# Patient Record
Sex: FEMALE | Race: WHITE | NOT HISPANIC OR LATINO | Employment: FULL TIME | URBAN - NONMETROPOLITAN AREA
[De-identification: names, ages, dates, MRNs, and addresses within clinical notes are randomized per-mention and may not be internally consistent; named-entity substitution may affect disease eponyms.]

---

## 2017-07-17 ENCOUNTER — HOSPITAL ENCOUNTER (EMERGENCY)
Facility: HOSPITAL | Age: 27
Discharge: HOME | End: 2017-07-17
Attending: NURSE PRACTITIONER | Admitting: NURSE PRACTITIONER
Payer: COMMERCIAL

## 2017-07-17 VITALS
RESPIRATION RATE: 17 BRPM | DIASTOLIC BLOOD PRESSURE: 72 MMHG | OXYGEN SATURATION: 98 % | TEMPERATURE: 98.6 F | HEART RATE: 71 BPM | SYSTOLIC BLOOD PRESSURE: 106 MMHG

## 2017-07-17 DIAGNOSIS — J02.9 VIRAL PHARYNGITIS: ICD-10-CM

## 2017-07-17 LAB
DEPRECATED S PYO AG THROAT QL EIA: NORMAL
MICRO REPORT STATUS: NORMAL
SPECIMEN SOURCE: NORMAL

## 2017-07-17 PROCEDURE — 87081 CULTURE SCREEN ONLY: CPT | Performed by: FAMILY MEDICINE

## 2017-07-17 PROCEDURE — 99213 OFFICE O/P EST LOW 20 MIN: CPT

## 2017-07-17 PROCEDURE — 99213 OFFICE O/P EST LOW 20 MIN: CPT | Performed by: NURSE PRACTITIONER

## 2017-07-17 PROCEDURE — 87880 STREP A ASSAY W/OPTIC: CPT | Performed by: FAMILY MEDICINE

## 2017-07-17 NOTE — ED AVS SNAPSHOT
[facility]    [address]    [place] [address]    Phone:  [phone]                                       Tiffanie Alejo   MRN: [id number]    Department:  [facility]   Date of Visit:  7/17/2017           Patient Information     Date Of Birth          1990        Your diagnoses for this visit were:     Viral pharyngitis        You were seen by Audelia Almanzar NP.      Follow-up Information     Follow up with Pinky Medel MD.    Specialty:  Family Practice    Why:  As needed, If symptoms worsen    Contact information:    [facility] [place]  [address]  [place] [address] [phone]          Follow up with [facility].    Specialty:  EMERGENCY MEDICINE    Why:  As needed, If symptoms worsen    Contact information:    [address]  [place] [address] [phone]    Additional information:    From [place]: Take [address]. Turn left at [address]. Turn left at the first stoplight on [address]. At the first stop sign, take a right onto [address]. Take a left into the parking lot and continue through until you reach the North enterance of the building.       From [place]: Take [address]. Take the [address] ramp towards [place]. Turn left onto [address]. Take a slight right onto [address]. Turn left at the first stoplight on [address]. At the first stop sign, take a right onto [address]. Take a left into the parking lot and continue through until you reach the North enterance of the building.       From [place]: Take [address]. Take a right at [address]. At the first stop sign, take a right onto [address]. Take a left into the parking lot and continue through until you reach the North enterance of the building.         Discharge Instructions          * PHARYNGITIS (Sore Throat),REPORT PENDING    Pharyngitis (sore throat) is  often due to a virus, but can also be caused by the  strep  bacteria. This is called  strep throat . Both viral and strep infection can cause throat pain that is worse when swallowing, aching all over with headache and fever. Both types of infections are contagious. They may be spread by coughing, kissing or touching others after touching your mouth or nose, so wash your hands often.  A test has been done to determine whether or not you have strep throat. If it is positive for strep infection you will usually need to take antibiotics. If the test is negative, you probably have a viral pharyngitis, and antibiotic treatment will not help you recover.  HOME CARE:    If your symptoms are severe, rest at home for the first 2-3 days. If you are told that your test is positive for strep, you should be off work and school for the first two days of antibiotic treatment. After that, you will no longer be as contagious.    Children: Use acetaminophen (Tylenol) for fever, fussiness or discomfort. In infants over six months of age, you may use ibuprofen (Children's Motrin) instead of Tylenol. [NOTE: If your child has chronic liver or kidney disease or ever had a stomach ulcer or GI bleeding, talk with your doctor before using these medicines.]   (Aspirin should never be used in anyone under 18 years of age who is ill with a fever. It may cause severe liver damage.)  Adults: You may use acetaminophen (Tylenol) 650-1000 mg every 6 hours or ibuprofen (Motrin, Advil) 600 mg every 6-8 hours with food to control pain, if you are able to take these medicines. [NOTE: If you have chronic liver or kidney disease or ever had a stomach ulcer or GI bleeding, talk with your doctor before using these medicines.]    Throat lozenges or sprays (Chloraseptic and others), or gargling with warm salt water will reduce throat pain. Dissolve 1/2 teaspoon of salt in 1 glass of warm water. This is especially useful just before meals.     FOLLOW UP with  your doctor as advised by our staff if you are not improving over the next week.  GET PROMPT MEDICAL ATTENTION  if any of the following occur:    Fever over 101 F (38.3 C) for more than three days    New or worsening ear pain, sinus pain or headache    Unable to swallow liquids or open your mouth wide due to throat pain    Trouble breathing    Muffled voice    New rash       [id number] [employer], [address] [place] [address], [place] [address]. All rights reserved. This information is not intended as a substitute for professional medical care. Always follow your healthcare professional's instructions.         Review of your medicines      Our records show that you are taking the medicines listed below. If these are incorrect, please call your family doctor or clinic.        Dose / Directions Last dose taken    cholestyramine 4 G Packet   Commonly known as:  QUESTRAN   Dose:  1 packet        Take 1 packet by mouth 3 times daily (with meals)   Refills:  0        dicyclomine 20 MG tablet   Commonly known as:  BENTYL   Dose:  20 mg   Indication:  Irritable Bowel Syndrome        Take 20 mg by mouth 4 times daily as needed   Refills:  0        FERROUS GLUCONATE PO   Dose:  324 mg        Take 324 mg by mouth 2 times daily (with meals)   Refills:  0        folic acid-vit B6-vit B12 0.8-10-0.115 MG Tabs per tablet   Commonly known as:  FOLGARD   Dose:  1 tablet        Take 1 tablet by mouth daily   Refills:  0        IBUPROFEN PO   Dose:  400 mg        Take 400 mg by mouth every 6 hours as needed for moderate pain Usually takes at 2-3 times a day   Refills:  0        norethindrone-ethinyl estradiol 0.5/0.75/1-35 MG-MCG per tablet   Commonly known as:  ORTHO-NOVUM 7/7/7   Dose:  1 tablet        Take 1 tablet by mouth daily   Refills:  0                Procedures and tests performed during your visit     Beta strep group A culture    Rapid strep screen      Orders Needing Specimen Collection     None      Pending Results      Date and Time Order Name Status Description    7/17/2017 1138 Beta strep group A culture In process             Pending Culture Results     Date and Time Order Name Status Description    7/17/2017 1138 Beta strep group A culture In process             Thank you for choosing [facility]       Thank you for choosing [facility] for your care. Our goal is always to provide you with excellent care. Hearing back from our patients is one way we can continue to improve our services. Please take a few minutes to complete the written survey that you may receive in the mail after you visit with us. Thank you!        The [employer] Information     "[employer] gives you secure access to your electronic health record. If you see a primary care provider, you can also send messages to your care team and make appointments. If you have questions, please call your primary care clinic.  If you do not have a primary care provider, please call [phone] and they will assist you.        Care EveryWhere ID     This is your Care EveryWhere ID. This could be used by other organizations to access your [facility] medical records  [id number]        Equal Access to Services     MARGARETH LYONS AH: Hadii shannan ornelaso Soanthony, waaxda luqadaha, qaybta kaalmada adealex, jerrell cuellar . So [facility] [phone].    ATENCIÓN: Si habla español, tiene a campbell disposición servicios gratuitos de asistencia lingüística. Llame al [phone].    We comply with applicable federal civil rights laws and [place] laws. We do not discriminate on the basis of race, color, national origin, age, disability sex, sexual orientation or gender identity.            After Visit Summary       This is your record. Keep this with you and show to your community pharmacist(s) and doctor(s) at your next visit.

## 2017-07-17 NOTE — ED PROVIDER NOTES
History     Chief Complaint   Patient presents with     Pharyngitis     3 day hx of pharygitis     The history is provided by the patient. No  was used.     Tiffanie Alejo is a 26 year old female who presents with 3 days of ST.  No URI sx.  She has not been ill with this . She has had st that wakes her up in the night. She has been using ibuprofen 800 mg every 8 hours. Dayquil and nyquil. No URI sx. She is a  for middle school .  No known exposures. No hx of allergies in the summer.     I have reviewed the Medications, Allergies, Past Medical and Surgical History, and Social History in the Epic system.        Review of Systems   Constitutional: Negative for activity change, appetite change, fatigue and fever.   HENT: Positive for sore throat. Negative for congestion, ear pain, postnasal drip, rhinorrhea, sinus pressure, trouble swallowing and voice change.    Eyes: Positive for redness.   Respiratory: Negative.  Negative for cough.    Cardiovascular: Negative.    Gastrointestinal: Negative.  Negative for diarrhea, nausea and vomiting.   Genitourinary: Negative.    Musculoskeletal: Negative.    Skin: Negative.  Negative for rash.   Neurological: Negative.  Negative for weakness and headaches.   Hematological: Negative.        Physical Exam   BP: 106/72  Pulse: 71  Temp: 98.6  F (37  C)  Resp: 17  SpO2: 98 %  Physical Exam   Constitutional: She is oriented to person, place, and time. She appears well-developed and well-nourished.   HENT:   Head: Normocephalic and atraumatic.   Right Ear: External ear normal.   Left Ear: External ear normal.   Mouth/Throat: No oropharyngeal exudate.   She has patchy erythema in her posterior pharynx , tonsils are surgically absent, uvula is midline , swallowing is normal , tongue is normal, no exudates  Her nasal mucosa is minimally erythematous and there is pale mucosa as well, thee is mucopurulent drainage present   Eyes: Conjunctivae and EOM are  "normal. Pupils are equal, round, and reactive to light. Right eye exhibits no discharge. Left eye exhibits no discharge.   Neck: Normal range of motion. Neck supple.   Cardiovascular: Normal rate, regular rhythm and normal heart sounds.  Exam reveals no gallop and no friction rub.    No murmur heard.  Pulmonary/Chest: Effort normal and breath sounds normal. She has no wheezes. She has no rales.   Abdominal: Soft. Bowel sounds are normal. There is no tenderness. There is no rebound and no guarding.   No CVa tenderness   Musculoskeletal: Normal range of motion.   Lymphadenopathy:     She has no cervical adenopathy.   Neurological: She is alert and oriented to person, place, and time.   Skin: Skin is warm and dry. No rash noted.   Nursing note and vitals reviewed.      ED Course     ED Course     Procedures               Labs Ordered and Resulted from Time of ED Arrival Up to the Time of Departure from the ED   RAPID STREP SCREEN   Likely viral pharyngitis  RST is obtained and is negative.  TC is pending.  Will be notified for positive culture.  Symptomatic measures in the meantime.  Warm, salt water gargles, soft and cold food. Chloraseptic or sucrets if desired.   Avoid spicy or sharp food.  Ibuprofen 800 mg every 8 hours with food, stop any GI upset for pain and swelling. It may be that she will develop some URI sx or this may resolve over the next few days.  Stop the nyquil.  Use dayquil if she notes nasal congestion that is causing PND or discomfort. She states \" the foods that are more scratchy actually feel better on her throat.\"  Continue sx measures.     Pathophysiology, possible etiology and treatment with potential outcomes, risks, benefits, and alternatives discussed to the best of my ability          Assessments & Plan (with Medical Decision Making)     I have reviewed the nursing notes.    I have reviewed the findings, diagnosis, plan and need for follow up with the patient.      Discharge Medication List " as of 7/17/2017 12:12 PM          Final diagnoses:   Viral pharyngitis     Pt verbalizes understanding and agreement with plan.  Follow up for worsening symptoms    7/17/2017   [facility]     Audelia Almanzar NP  07/18/17 8342

## 2017-07-17 NOTE — DISCHARGE INSTRUCTIONS

## 2017-07-17 NOTE — ED AVS SNAPSHOT
[facility]    [address] [address]    Phone:  [phone]                                       Tiffanie Alejo   MRN: [id number]    Department:  [facility]   Date of Visit:  7/17/2017           After Visit Summary Signature Page     I have received my discharge instructions, and my questions have been answered. I have discussed any challenges I see with this plan with the nurse or doctor.    ..........................................................................................................................................  Patient/Patient Representative Signature      ..........................................................................................................................................  Patient Representative Print Name and Relationship to Patient    ..................................................               ................................................  Date                                            Time    ..........................................................................................................................................  Reviewed by Signature/Title    ...................................................              ..............................................  Date                                                            Time

## 2017-07-18 ASSESSMENT — ENCOUNTER SYMPTOMS
CARDIOVASCULAR NEGATIVE: 1
COUGH: 0
RHINORRHEA: 0
HEADACHES: 0
TROUBLE SWALLOWING: 0
VOMITING: 0
EYE REDNESS: 1
MUSCULOSKELETAL NEGATIVE: 1
DIARRHEA: 0
VOICE CHANGE: 0
SORE THROAT: 1
HEMATOLOGIC/LYMPHATIC NEGATIVE: 1
GASTROINTESTINAL NEGATIVE: 1
RESPIRATORY NEGATIVE: 1
SINUS PRESSURE: 0
FEVER: 0
NAUSEA: 0
WEAKNESS: 0
APPETITE CHANGE: 0
FATIGUE: 0
ACTIVITY CHANGE: 0
NEUROLOGICAL NEGATIVE: 1

## 2017-07-19 LAB
BACTERIA SPEC CULT: NORMAL
MICRO REPORT STATUS: NORMAL
SPECIMEN SOURCE: NORMAL

## 2017-08-12 ENCOUNTER — HEALTH MAINTENANCE LETTER (OUTPATIENT)
Age: 27
End: 2017-08-12

## 2017-10-13 DIAGNOSIS — Z30.9 ENCOUNTER FOR CONTRACEPTIVE MANAGEMENT, UNSPECIFIED TYPE: ICD-10-CM

## 2017-10-16 RX ORDER — NORETHINDRONE AND ETHINYL ESTRADIOL 1 MG-35MCG
KIT ORAL
Qty: 84 TABLET | Refills: 1 | Status: SHIPPED | OUTPATIENT
Start: 2017-10-16 | End: 2018-05-16

## 2017-10-16 NOTE — TELEPHONE ENCOUNTER
EDWARD 1/35 1-35 MG-MCG     Last Written Prescription Date: pt reported - never filled here   Last Fill Quantity: 84,  # refills: 0   Last Office Visit with [facility], [facility] or [facility] prescribing provider: 6/10/16

## 2018-05-16 DIAGNOSIS — Z30.9 ENCOUNTER FOR CONTRACEPTIVE MANAGEMENT, UNSPECIFIED TYPE: ICD-10-CM

## 2018-05-17 RX ORDER — NORETHINDRONE AND ETHINYL ESTRADIOL 1 MG-35MCG
KIT ORAL
Qty: 28 TABLET | Refills: 0 | Status: SHIPPED | OUTPATIENT
Start: 2018-05-17 | End: 2018-05-30

## 2018-05-17 NOTE — TELEPHONE ENCOUNTER
ALYACEN 1-35-28 TABLET     Last Written Prescription Date:  10/16/2017  Last Fill Quantity: 84,   # refills: 1  Last Office Visit: 6/10/2016  Future Office visit:    Next 5 appointments (look out 90 days)     Hardy 15, 2018  1:15 PM CDT   (Arrive by 1:00 PM)   PHYSICAL with Daisy Schilling NP   [facility] ([facility] )    [address] [place] [address]   [phone]

## 2018-05-17 NOTE — TELEPHONE ENCOUNTER
Patient has physical scheduled next month.  Are you okay filling for one month to get to appointment?     Next 5 appointments (look out 90 days)     Hardy 15, 2018  1:15 PM CDT   (Arrive by 1:00 PM)   PHYSICAL with Daisy Schilling NP   [facility] ([facility] )    [address] [place] [address]   [phone]

## 2018-05-30 DIAGNOSIS — Z30.9 ENCOUNTER FOR CONTRACEPTIVE MANAGEMENT, UNSPECIFIED TYPE: ICD-10-CM

## 2018-05-31 NOTE — TELEPHONE ENCOUNTER
Kendrick  Last Written Prescription Date: 5/17/18  Last Fill Quantity: 28 # of Refills: 0  Last Office Visit: 6/10/16

## 2018-06-01 RX ORDER — NORETHINDRONE AND ETHINYL ESTRADIOL 1 MG-35MCG
KIT ORAL
Qty: 28 TABLET | Refills: 0 | Status: SHIPPED | OUTPATIENT
Start: 2018-06-01 | End: 2018-06-15

## 2018-06-15 ENCOUNTER — OFFICE VISIT (OUTPATIENT)
Dept: OBGYN | Facility: OTHER | Age: 28
End: 2018-06-15
Attending: NURSE PRACTITIONER
Payer: COMMERCIAL

## 2018-06-15 VITALS
DIASTOLIC BLOOD PRESSURE: 64 MMHG | OXYGEN SATURATION: 99 % | SYSTOLIC BLOOD PRESSURE: 110 MMHG | HEART RATE: 74 BPM | HEIGHT: 62 IN | WEIGHT: 120 LBS | BODY MASS INDEX: 22.08 KG/M2

## 2018-06-15 DIAGNOSIS — D64.9 ANEMIA, UNSPECIFIED TYPE: ICD-10-CM

## 2018-06-15 DIAGNOSIS — Z12.4 SCREENING FOR CERVICAL CANCER: ICD-10-CM

## 2018-06-15 DIAGNOSIS — Z30.09 FAMILY PLANNING: Primary | ICD-10-CM

## 2018-06-15 DIAGNOSIS — Z30.9 ENCOUNTER FOR CONTRACEPTIVE MANAGEMENT, UNSPECIFIED TYPE: ICD-10-CM

## 2018-06-15 LAB
ERYTHROCYTE [DISTWIDTH] IN BLOOD BY AUTOMATED COUNT: 12.3 % (ref 10–15)
HCT VFR BLD AUTO: 40.9 % (ref 35–47)
HGB BLD-MCNC: 13.7 G/DL (ref 11.7–15.7)
MCH RBC QN AUTO: 30.1 PG (ref 26.5–33)
MCHC RBC AUTO-ENTMCNC: 33.5 G/DL (ref 31.5–36.5)
MCV RBC AUTO: 90 FL (ref 78–100)
PLATELET # BLD AUTO: 200 10E9/L (ref 150–450)
RBC # BLD AUTO: 4.55 10E12/L (ref 3.8–5.2)
WBC # BLD AUTO: 5.4 10E9/L (ref 4–11)

## 2018-06-15 PROCEDURE — 85027 COMPLETE CBC AUTOMATED: CPT | Performed by: NURSE PRACTITIONER

## 2018-06-15 PROCEDURE — 99000 SPECIMEN HANDLING OFFICE-LAB: CPT | Performed by: NURSE PRACTITIONER

## 2018-06-15 PROCEDURE — 36415 COLL VENOUS BLD VENIPUNCTURE: CPT | Performed by: NURSE PRACTITIONER

## 2018-06-15 PROCEDURE — 88142 CYTOPATH C/V THIN LAYER: CPT | Performed by: NURSE PRACTITIONER

## 2018-06-15 PROCEDURE — 87624 HPV HI-RISK TYP POOLED RSLT: CPT | Mod: 90 | Performed by: NURSE PRACTITIONER

## 2018-06-15 PROCEDURE — 99213 OFFICE O/P EST LOW 20 MIN: CPT | Performed by: NURSE PRACTITIONER

## 2018-06-15 RX ORDER — PRENATAL VIT/IRON FUM/FOLIC AC 27MG-0.8MG
1 TABLET ORAL DAILY
Qty: 100 TABLET | Refills: 3 | Status: SHIPPED | OUTPATIENT
Start: 2018-06-15 | End: 2019-07-02

## 2018-06-15 RX ORDER — CHOLESTYRAMINE 4 G/9G
1 POWDER, FOR SUSPENSION ORAL PRN
Status: ON HOLD | COMMUNITY
End: 2023-02-11

## 2018-06-15 ASSESSMENT — PAIN SCALES - GENERAL: PAINLEVEL: NO PAIN (0)

## 2018-06-15 ASSESSMENT — ANXIETY QUESTIONNAIRES
6. BECOMING EASILY ANNOYED OR IRRITABLE: NOT AT ALL
1. FEELING NERVOUS, ANXIOUS, OR ON EDGE: SEVERAL DAYS
3. WORRYING TOO MUCH ABOUT DIFFERENT THINGS: NOT AT ALL
7. FEELING AFRAID AS IF SOMETHING AWFUL MIGHT HAPPEN: NOT AT ALL
2. NOT BEING ABLE TO STOP OR CONTROL WORRYING: NOT AT ALL
5. BEING SO RESTLESS THAT IT IS HARD TO SIT STILL: NOT AT ALL
IF YOU CHECKED OFF ANY PROBLEMS ON THIS QUESTIONNAIRE, HOW DIFFICULT HAVE THESE PROBLEMS MADE IT FOR YOU TO DO YOUR WORK, TAKE CARE OF THINGS AT HOME, OR GET ALONG WITH OTHER PEOPLE: NOT DIFFICULT AT ALL
GAD7 TOTAL SCORE: 2

## 2018-06-15 ASSESSMENT — PATIENT HEALTH QUESTIONNAIRE - PHQ9: 5. POOR APPETITE OR OVEREATING: SEVERAL DAYS

## 2018-06-15 NOTE — NURSING NOTE
"Chief Complaint   Patient presents with     Gyn Exam       Initial /64 (BP Location: Right arm, Patient Position: Sitting, Cuff Size: Adult Regular)  Pulse 74  Ht 5' 2\" (1.575 m)  Wt 120 lb (54.4 kg)  SpO2 99%  BMI 21.95 kg/m2 Estimated body mass index is 21.95 kg/(m^2) as calculated from the following:    Height as of this encounter: 5' 2\" (1.575 m).    Weight as of this encounter: 120 lb (54.4 kg).  Medication Reconciliation: complete    Patricia Hernández LPN    "

## 2018-06-15 NOTE — MR AVS SNAPSHOT
After Visit Summary   6/15/2018    Tiffanie Alejo    MRN: [id number]           Patient Information     Date Of Birth          1990        Visit Information        Provider Department      6/15/2018 11:00 AM Daisy Schilling NP [facility]        Today's Diagnoses     Family planning    -  1    Encounter for contraceptive management, unspecified type        Screening for cervical cancer        Anemia, unspecified type          Care Instructions      Zika and Pregnancy Planning  Zika is a virus that causes a mild infection in most people, but can lead to severe complications. It can cause severe birth defects in an unborn baby if a pregnant woman has the virus. There is no vaccine to prevent Zika infection. If you are planning a pregnancy, you ll need to take steps to take to protect yourself and your partner.  How does Zika spread?  The Zika virus spreads through mosquito bites and sexual activity. To prevent infection, you need to:     Protect yourself from mosquitoes.    Use protection during any sexual activity.    Plan the timing of a pregnancy carefully.  If you want to prevent pregnancy right now  If you are a woman of childbearing age and you live in an area with active Zika, you may choose to prevent pregnancy during this time. Your healthcare provider can prescribe birth control for you. This may be a birth control pill you take by mouth, a small implant in the uterus, or another method. Talk with your healthcare provider about the type of birth control that may work best for you. You should use condoms to prevent pregnancy if you haven t started other birth control.  If you are planning a pregnancy  If you are planning to get pregnant, it s best not to travel to areas with Zika. If you live in or travel to an area with Zika, protect yourself from mosquito bites (see below). You ll also need to take care when you try with your partner.  If you have symptoms or test positive  for Zika:     If you are a woman, don t try for a pregnancy until at least 8 weeks after your Zika symptoms start.    If you are a man, don t try for a pregnancy until at least 6 months after your Zika symptoms start.  After a trip to an area with Zika, you may still have the virus even if you don t have Zika symptoms. Make sure to:     Use condoms or don t have sex for at least 6 months.    Don t try for a pregnancy for at least 6 months.  If you live in an area with Zika, talk with your healthcare provider about the safest times to try for a pregnancy                                                                        Preventing mosquito bites  If you live in or travel to an area with Zika, protect yourself from mosquito bites. Make sure to:     Get rid of standing water in your yard. Mosquitoes lay eggs near water. Check pots and planters, outdoor kids  toys, bird baths, and any other containers for puddles of water. Empty the water regularly and keep these areas clean and dry.    Choose air conditioning or screens. At home, don t leave windows or doors open unless they have screens. Patch any holes in the screens. When picking a restaurant, hotel, or other venue, choose places with screens in windows and doors, or air conditioning.    Wear protective clothing. Wear clothing that covers your body to prevent mosquito bites. Choose clothing with long sleeves and long pant legs. Wear socks and shoes to cover your feet and ankles.    Use insect skin spray often. Buy a skin spray that is EPA-approved and contains DEET, picaridin (also called KBR 3023, Bayrepel, or icaridin), oil of lemon eucalyptus (OLE), para-menthan-diol (PMD), or QX0614. Make sure to follow the instructions on the label about how to apply and when to reapply. Use it during the day and at night. EPA-approved insect sprays are safe for pregnant and breastfeeding women.    Use permethrin. This is a type of insecticide you can apply to fabrics. If  you are going camping, treat your clothing, shoes, and your tent with permethrin. You can also buy gear that s already treated. Don t use permethrin directly on your skin.    Sleep with a mosquito net. Make sure to use a mosquito net if you are sleeping outdoors or in a place with no screens or air conditioning.     If you think you may have Zika  If you live in or you or your partner visits an area with Zika, contact your healthcare provider right away if you have symptoms. Symptoms may be mild and can include fever, rash, joint pain, red eyes, muscle aches, and headache. You may need a blood or urine test to check for the virus. There is no treatment for Zika.       Date Last Reviewed: 9/1/2016 2000-2017 [employer]. [address] [place] [address]. All rights reserved. This information is not intended as a substitute for professional medical care. Always follow your healthcare professional's instructions.                Follow-ups after your visit        Who to contact     If you have questions or need follow up information about today's clinic visit or your schedule please contact [facility] directly at [phone].  Normal or non-critical lab and imaging results will be communicated to you by MyChart, letter or phone within 4 business days after the clinic has received the results. If you do not hear from us within 7 days, please contact the clinic through [facility] or phone. If you have a critical or abnormal lab result, we will notify you by phone as soon as possible.  Submit refill requests through [employer] or call your pharmacy and they will forward the refill request to us. Please allow 3 business days for your refill to be completed.          Additional Information About Your Visit        MyChart Information     [employer] gives you secure access to your electronic health record. If you see a primary care provider, you can also send messages to your care team and make  "appointments. If you have questions, please call your primary care clinic.  If you do not have a primary care provider, please call [phone] and they will assist you.        Care EveryWhere ID     This is your Care EveryWhere ID. This could be used by other organizations to access your [facility] medical records  [id number]        Your Vitals Were     Pulse Height Pulse Oximetry BMI (Body Mass Index)          74 5' 2\" (1.575 m) 99% 21.95 kg/m2         Blood Pressure from Last 3 Encounters:   06/15/18 110/64   07/17/17 106/72   06/10/16 112/60    Weight from Last 3 Encounters:   06/15/18 120 lb (54.4 kg)   06/10/16 118 lb (53.5 kg)   03/05/15 118 lb (53.5 kg)              We Performed the Following     A pap thin layer screen with  HPV - recommended age 30 - 65 years (select HPV order below)     CBC with platelets     HPV High Risk Types DNA Cervical          Today's Medication Changes          These changes are accurate as of 6/15/18 11:59 PM.  If you have any questions, ask your nurse or doctor.               Start taking these medicines.        Dose/Directions    prenatal multivitamin plus iron 27-0.8 MG Tabs per tablet   Used for:  Family planning   Started by:  Daisy Schilling, NP        Dose:  1 tablet   Take 1 tablet by mouth daily   Quantity:  100 tablet   Refills:  3            Where to get your medicines      These medications were sent to [facility] - [address]  [address] [address]     Phone:  [phone]     norethindrone-ethinyl estradiol 1-35 MG-MCG per tablet    prenatal multivitamin plus iron 27-0.8 MG Tabs per tablet                Primary Care Provider Office Phone # Fax #    Pinky Medel [phone]       [facility] [address] [address]        Equal Access to Services     MARGARETH LYONS AH: Jacklyn England, waaxda luqadaha, qaybta kaalmarissa cazares, jerrell cuellar " ah. So [facility] [phone].    ATENCIÓN: Si isaias hermosillo, tiene a campbell disposición servicios gratuitos de asistencia lingüística. Ramses campos [phone].    We comply with applicable federal civil rights laws and [place] laws. We do not discriminate on the basis of race, color, national origin, age, disability, sex, sexual orientation, or gender identity.            Thank you!     Thank you for choosing [facility]  for your care. Our goal is always to provide you with excellent care. Hearing back from our patients is one way we can continue to improve our services. Please take a few minutes to complete the written survey that you may receive in the mail after your visit with us. Thank you!             Your Updated Medication List - Protect others around you: Learn how to safely use, store and throw away your medicines at www.disposemymeds.org.          This list is accurate as of 6/15/18 11:59 PM.  Always use your most recent med list.                   Brand Name Dispense Instructions for use Diagnosis    dicyclomine 20 MG tablet    BENTYL     Take 20 mg by mouth 4 times daily as needed        FERROUS GLUCONATE PO      Take 324 mg by mouth 2 times daily (with meals)        IBUPROFEN PO      Take 400 mg by mouth every 6 hours as needed for moderate pain Usually takes at 2-3 times a day        norethindrone-ethinyl estradiol 1-35 MG-MCG per tablet    ALAYCEN 1/35    84 tablet    Take 1 tablet by mouth daily    Encounter for contraceptive management, unspecified type       prenatal multivitamin plus iron 27-0.8 MG Tabs per tablet     100 tablet    Take 1 tablet by mouth daily    Family planning       QUESTRAN 4 g Packet   Generic drug:  cholestyramine      Take 1 packet by mouth 3 times daily (with meals)

## 2018-06-16 ASSESSMENT — ANXIETY QUESTIONNAIRES: GAD7 TOTAL SCORE: 2

## 2018-06-16 ASSESSMENT — PATIENT HEALTH QUESTIONNAIRE - PHQ9: SUM OF ALL RESPONSES TO PHQ QUESTIONS 1-9: 3

## 2018-06-19 NOTE — PATIENT INSTRUCTIONS
Zika and Pregnancy Planning  Zika is a virus that causes a mild infection in most people, but can lead to severe complications. It can cause severe birth defects in an unborn baby if a pregnant woman has the virus. There is no vaccine to prevent Zika infection. If you are planning a pregnancy, you ll need to take steps to take to protect yourself and your partner.  How does Zika spread?  The Zika virus spreads through mosquito bites and sexual activity. To prevent infection, you need to:     Protect yourself from mosquitoes.    Use protection during any sexual activity.    Plan the timing of a pregnancy carefully.  If you want to prevent pregnancy right now  If you are a woman of childbearing age and you live in an area with active Zika, you may choose to prevent pregnancy during this time. Your healthcare provider can prescribe birth control for you. This may be a birth control pill you take by mouth, a small implant in the uterus, or another method. Talk with your healthcare provider about the type of birth control that may work best for you. You should use condoms to prevent pregnancy if you haven t started other birth control.  If you are planning a pregnancy  If you are planning to get pregnant, it s best not to travel to areas with Zika. If you live in or travel to an area with Zika, protect yourself from mosquito bites (see below). You ll also need to take care when you try with your partner.  If you have symptoms or test positive for Zika:     If you are a woman, don t try for a pregnancy until at least 8 weeks after your Zika symptoms start.    If you are a man, don t try for a pregnancy until at least 6 months after your Zika symptoms start.  After a trip to an area with Zika, you may still have the virus even if you don t have Zika symptoms. Make sure to:     Use condoms or don t have sex for at least 6 months.    Don t try for a pregnancy for at least 6 months.  If you live in an area with Zika, talk  with your healthcare provider about the safest times to try for a pregnancy                                                                        Preventing mosquito bites  If you live in or travel to an area with Zika, protect yourself from mosquito bites. Make sure to:     Get rid of standing water in your yard. Mosquitoes lay eggs near water. Check pots and planters, outdoor kids  toys, bird baths, and any other containers for puddles of water. Empty the water regularly and keep these areas clean and dry.    Choose air conditioning or screens. At home, don t leave windows or doors open unless they have screens. Patch any holes in the screens. When picking a restaurant, hotel, or other venue, choose places with screens in windows and doors, or air conditioning.    Wear protective clothing. Wear clothing that covers your body to prevent mosquito bites. Choose clothing with long sleeves and long pant legs. Wear socks and shoes to cover your feet and ankles.    Use insect skin spray often. Buy a skin spray that is EPA-approved and contains DEET, picaridin (also called KBR 3023, Bayrepel, or icaridin), oil of lemon eucalyptus (OLE), para-menthan-diol (PMD), or ZR0491. Make sure to follow the instructions on the label about how to apply and when to reapply. Use it during the day and at night. EPA-approved insect sprays are safe for pregnant and breastfeeding women.    Use permethrin. This is a type of insecticide you can apply to fabrics. If you are going camping, treat your clothing, shoes, and your tent with permethrin. You can also buy gear that s already treated. Don t use permethrin directly on your skin.    Sleep with a mosquito net. Make sure to use a mosquito net if you are sleeping outdoors or in a place with no screens or air conditioning.     If you think you may have Zika  If you live in or you or your partner visits an area with Zika, contact your healthcare provider right away if you have symptoms.  Symptoms may be mild and can include fever, rash, joint pain, red eyes, muscle aches, and headache. You may need a blood or urine test to check for the virus. There is no treatment for Zika.       Date Last Reviewed: 9/1/2016 2000-2017 [employer]. [address] [place] [address]. All rights reserved. This information is not intended as a substitute for professional medical care. Always follow your healthcare professional's instructions.

## 2018-06-19 NOTE — PROGRESS NOTES
CC:  Annual exam  HPI:  Tiffanie Alejo is a 27 year old female P0 No LMP recorded. Patient is not currently having periods (Reason: Birth Control).  She and her  are considering starting a family this fall and she would like to discuss preconception planning.  She is up to date with her PCP.  Last pap was negative.  She is up to date with her gastroenterologist.  History of anemia due to bowel issues. Regular exercise and usually healthy diet.  No drug use and limited alcohol.   No other c/o.      Past GYN history:  No STD history  STI testing offered?  Declined       Last PAP smear:  Normal  Mammograms up to date: not applicable      Past Medical History:   Diagnosis Date     Gluten intolerance      Lactose Intolerance 02/22/2013     Ovarian cyst 02/22/2013     Spondylolisthesis at L5-S1 level     grade 1     Urinary incontinence     urge       Past Surgical History:   Procedure Laterality Date     ADENOIDECTOMY  2010     COLONOSCOPY  11/4/2014     ESOPHAGOGASTRODUODENOSCOPY  7/2014     GI SURGERY  3/7/13    colonoscopy     LAPAROSCOPY DIAGNOSTIC (GENERAL)       TONSILLECTOMY  2010     [place] Teeth Extraction         Family History   Problem Relation Age of Onset     HEART DISEASE Mother 50     SCAD/AMI 2nd to SCAD     Breast Cancer Mother 54     Family History Negative Father      interstitial lung disease.       Current Outpatient Prescriptions   Medication Sig Dispense Refill     cholestyramine (QUESTRAN) 4 g Packet Take 1 packet by mouth 3 times daily (with meals)       dicyclomine (BENTYL) 20 MG tablet Take 20 mg by mouth 4 times daily as needed       FERROUS GLUCONATE PO Take 324 mg by mouth 2 times daily (with meals)       IBUPROFEN PO Take 400 mg by mouth every 6 hours as needed for moderate pain Usually takes at 2-3 times a day       norethindrone-ethinyl estradiol (ALAYCEN 1/35) 1-35 MG-MCG per tablet Take 1 tablet by mouth daily 84 tablet 4     Prenatal Vit-Fe Fumarate-FA (PRENATAL  "MULTIVITAMIN PLUS IRON) 27-0.8 MG TABS per tablet Take 1 tablet by mouth daily 100 tablet 3     [DISCONTINUED] polyethylene glycol (MIRALAX/GLYCOLAX) powder Take 1 capful by mouth daily.         Allergies: Wheat extract    ROS:  CONSTITUTIONAL:NEGATIVE for fever, chills, change in weight  BREAST: NEGATIVE for masses, tenderness or discharge  : negative for, dysparunia and vaginal discharge    EXAM:  Blood pressure 110/64, pulse 74, height 5' 2\" (1.575 m), weight 120 lb (54.4 kg), SpO2 99 %.   BMI= Body mass index is 21.95 kg/(m^2).  General - pleasant female in no acute distress.  Neck - supple without lymphadenopathy or thyromegaly.  Lungs - clear to auscultation bilaterally.  Heart - regular rate and rhythm without murmur.  Breast - no nodularity, asymmetry or nipple discharge bilaterally.  Abdomen - soft, nontender, nondistended, no hepatosplenomegaly.  Pelvic - EG: normal adult female, BUS: within normal limits, Vagina: well rugated, no discharge, Cervix: no lesions or CMT, Uterus: firm, normal sized and nontender, Adnexae: no masses or tenderness.  Rectovaginal - deferred.  Musculoskeletal - no gross deformities.  Neurological - normal strength, sensation, and mental status.      ASSESSMENT/PLAN:  (Z30.09) Family planning  (primary encounter diagnosis)  Comment:   Plan: Prenatal Vit-Fe Fumarate-FA (PRENATAL         MULTIVITAMIN PLUS IRON) 27-0.8 MG TABS per         tablet            (Z30.9) Encounter for contraceptive management, unspecified type  Comment:   Plan: norethindrone-ethinyl estradiol (ALAYCEN 1/35)         1-35 MG-MCG per tablet        Taking back to back.  Will plan to stop after 2 more cycles.    (Z12.4) Screening for cervical cancer  Comment:   Plan: A pap thin layer screen with  HPV - recommended        age 30 - 65 years (select HPV order below), HPV        High Risk Types DNA Cervical            (D64.9) Anemia, unspecified type  Comment:   Plan: CBC with platelets              Discussed " exercise and healthy eating, including calcium intake.  She should return to the clinic in one year, or sooner if problems arise.

## 2018-06-21 LAB
COPATH REPORT: ABNORMAL
PAP: ABNORMAL

## 2018-06-22 LAB
FINAL DIAGNOSIS: ABNORMAL
HPV HR 12 DNA CVX QL NAA+PROBE: POSITIVE
HPV16 DNA SPEC QL NAA+PROBE: NEGATIVE
HPV18 DNA SPEC QL NAA+PROBE: NEGATIVE
SPECIMEN DESCRIPTION: ABNORMAL
SPECIMEN SOURCE CVX/VAG CYTO: ABNORMAL

## 2018-06-26 ENCOUNTER — MYC MEDICAL ADVICE (OUTPATIENT)
Dept: OBGYN | Facility: OTHER | Age: 28
End: 2018-06-26

## 2018-11-21 ENCOUNTER — TELEPHONE (OUTPATIENT)
Dept: OBGYN | Facility: OTHER | Age: 28
End: 2018-11-21

## 2018-11-21 NOTE — TELEPHONE ENCOUNTER
Patient called stating she just found out she is pregnant. Would like to see Daisy Schilling.    Patricia could you please call patient to schedule first ob.     Thank you

## 2018-12-03 ENCOUNTER — TELEPHONE (OUTPATIENT)
Dept: OBGYN | Facility: OTHER | Age: 28
End: 2018-12-03

## 2018-12-03 ENCOUNTER — HOSPITAL ENCOUNTER (OUTPATIENT)
Dept: ULTRASOUND IMAGING | Facility: HOSPITAL | Age: 28
Discharge: HOME | End: 2018-12-03
Attending: OBSTETRICS & GYNECOLOGY | Admitting: OBSTETRICS & GYNECOLOGY
Payer: COMMERCIAL

## 2018-12-03 ENCOUNTER — PRENATAL OFFICE VISIT (OUTPATIENT)
Dept: OBGYN | Facility: OTHER | Age: 28
End: 2018-12-03
Attending: OBSTETRICS & GYNECOLOGY
Payer: COMMERCIAL

## 2018-12-03 VITALS
HEART RATE: 80 BPM | DIASTOLIC BLOOD PRESSURE: 60 MMHG | WEIGHT: 127 LBS | HEIGHT: 62 IN | BODY MASS INDEX: 23.37 KG/M2 | SYSTOLIC BLOOD PRESSURE: 98 MMHG

## 2018-12-03 DIAGNOSIS — O26.891 PREGNANCY RELATED PELVIC PAIN, ANTEPARTUM, FIRST TRIMESTER: ICD-10-CM

## 2018-12-03 DIAGNOSIS — R10.2 PREGNANCY RELATED PELVIC PAIN, ANTEPARTUM, FIRST TRIMESTER: ICD-10-CM

## 2018-12-03 DIAGNOSIS — R10.2 PREGNANCY RELATED PELVIC PAIN, ANTEPARTUM, FIRST TRIMESTER: Primary | ICD-10-CM

## 2018-12-03 DIAGNOSIS — Z34.90 EARLY STAGE OF PREGNANCY: Primary | ICD-10-CM

## 2018-12-03 DIAGNOSIS — O26.891 PREGNANCY RELATED PELVIC PAIN, ANTEPARTUM, FIRST TRIMESTER: Primary | ICD-10-CM

## 2018-12-03 PROCEDURE — 99207 ZZC PRENATAL VISIT: CPT | Performed by: OBSTETRICS & GYNECOLOGY

## 2018-12-03 PROCEDURE — 76801 OB US < 14 WKS SINGLE FETUS: CPT | Mod: TC

## 2018-12-03 PROCEDURE — G0463 HOSPITAL OUTPT CLINIC VISIT: HCPCS | Mod: 25 | Performed by: OBSTETRICS & GYNECOLOGY

## 2018-12-03 ASSESSMENT — PAIN SCALES - GENERAL: PAINLEVEL: MILD PAIN (3)

## 2018-12-03 NOTE — NURSING NOTE
"Chief Complaint   Patient presents with     Prenatal Care       Initial BP 98/60 (BP Location: Left arm, Patient Position: Sitting, Cuff Size: Adult Regular)  Pulse 80  Ht 5' 2\" (1.575 m)  Wt 127 lb (57.6 kg)  BMI 23.23 kg/m2 Estimated body mass index is 23.23 kg/(m^2) as calculated from the following:    Height as of this encounter: 5' 2\" (1.575 m).    Weight as of this encounter: 127 lb (57.6 kg).  Medication Reconciliation: complete    JAIRO MCCALL LPN    "

## 2018-12-03 NOTE — MR AVS SNAPSHOT
After Visit Summary   12/3/2018    Tiffanie Alejo    MRN: [id number]           Patient Information     Date Of Birth          1990        Visit Information        Provider Department      12/3/2018 3:30 PM Milo Stuart MD [facility]        Today's Diagnoses     Early stage of pregnancy    -  1       Follow-ups after your visit        Your next 10 appointments already scheduled     Dec 13, 2018  9:00 AM CST   (Arrive by 8:45 AM)   ESTABLISHED PRENATAL with Ankush Godinez MD   [facility] ([facility] )    [address] [place] [address]   [phone]              Future tests that were ordered for you today     Open Future Orders        Priority Expected Expires Ordered    US OB <14 Weeks w Transvaginal Single Routine  12/3/2019 12/3/2018            Who to contact     If you have questions or need follow up information about today's clinic visit or your schedule please contact [facility] directly at [phone].  Normal or non-critical lab and imaging results will be communicated to you by [employer], letter or phone within 4 business days after the clinic has received the results. If you do not hear from us within 7 days, please contact the clinic through [employer] or phone. If you have a critical or abnormal lab result, we will notify you by phone as soon as possible.  Submit refill requests through [employer] or call your pharmacy and they will forward the refill request to us. Please allow 3 business days for your refill to be completed.          Additional Information About Your Visit        [facility] Information     [employer] gives you secure access to your electronic health record. If you see a primary care provider, you can also send messages to your care team and make appointments. If you have questions, please call your primary care clinic.  If you do not have a primary care provider, please  "call [phone] and they will assist you.        Care EveryWhere ID     This is your Care EveryWhere ID. This could be used by other organizations to access your [facility] medical records  [id number]        Your Vitals Were     Pulse Height BMI (Body Mass Index)             80 5' 2\" (1.575 m) 23.23 kg/m2          Blood Pressure from Last 3 Encounters:   12/03/18 98/60   06/15/18 110/64   07/17/17 106/72    Weight from Last 3 Encounters:   12/03/18 127 lb (57.6 kg)   06/15/18 120 lb (54.4 kg)   06/10/16 118 lb (53.5 kg)              Today, you had the following     No orders found for display       Primary Care Provider Office Phone # Fax #    Pinky Medel [phone]       [facility] [place] [address]  [place] [address]        Equal Access to Services     MARGARETH LYONS AH: Hadii aad ku hadasho Soomaali, waaxda luqadaha, qaybta kaalmada adeegyada, waxay priscillain hayreman olivia kharajunior cuellar . So [facility] [phone].    ATENCIÓN: Si habla español, tiene a campbell disposición servicios gratuitos de asistencia lingüística. Llame al [phone].    We comply with applicable federal civil rights laws and [place] laws. We do not discriminate on the basis of race, color, national origin, age, disability, sex, sexual orientation, or gender identity.            Thank you!     Thank you for choosing [facility]  for your care. Our goal is always to provide you with excellent care. Hearing back from our patients is one way we can continue to improve our services. Please take a few minutes to complete the written survey that you may receive in the mail after your visit with us. Thank you!             Your Updated Medication List - Protect others around you: Learn how to safely use, store and throw away your medicines at www.disposemymeds.org.          This list is accurate as of 12/3/18  4:32 PM.  Always use your most recent med list.                   Brand Name Dispense " Instructions for use Diagnosis    dicyclomine 20 MG tablet    BENTYL     Take 20 mg by mouth 4 times daily as needed        FERROUS GLUCONATE PO      Take 324 mg by mouth 2 times daily (with meals)        prenatal multivitamin w/iron 27-0.8 MG tablet     100 tablet    Take 1 tablet by mouth daily    Family planning       QUESTRAN 4 g packet   Generic drug:  cholestyramine      Take 1 packet by mouth 3 times daily (with meals)

## 2018-12-03 NOTE — TELEPHONE ENCOUNTER
Pt is newly pregnant with LMP of 10/22/18. GA approx 6 weeks. Has appt on 12/13/18 with Dr Godinez. States she has had a light stabbing pain in right pelvic area. Denies bleeding, or fever.  Please advise

## 2018-12-03 NOTE — PROGRESS NOTES
"Tiffanie Alejo is a 28 year old  at 6 0/7 weeks by certain LMP. She called in today reporting right sided abdominal discomfort. An ultrasound obtained today revealed an IUP at 6 2/7 weeks with + fetal cardiac activity. She denies and significant nausea or vomiting. She denies any bleeding.    ROS:  Constitutional, neuro, endocrine, gastrointestinal, genitourinary and psychiatric systems are otherwise negative.    Past Medical History:   Diagnosis Date     Gluten intolerance      Lactose Intolerance 2013     Ovarian cyst 2013     Spondylolisthesis at L5-S1 level     grade 1     Urinary incontinence     urge     Past Surgical History:   Procedure Laterality Date     ADENOIDECTOMY       COLONOSCOPY  2014     ESOPHAGOGASTRODUODENOSCOPY  2014     GI SURGERY  3/7/13    colonoscopy     LAPAROSCOPY DIAGNOSTIC (GENERAL)       TONSILLECTOMY       [place] Teeth Extraction          Allergies   Allergen Reactions     Wheat Extract      Stomach pains, constipation and diarrhea     EXAM  BP 98/60 (BP Location: Left arm, Patient Position: Sitting, Cuff Size: Adult Regular)  Pulse 80  Ht 5' 2\" (1.575 m)  Wt 127 lb (57.6 kg)  BMI 23.23 kg/m2  Gen - NAD    A/P Early intrauterine pregnancy   Discussed ultrasound results   Discussed early pregnancy expectations   Pt has OB intake visit scheduled   She is taking a PNV   Ultrasound shows viable IUP consistent with LMP   Pt does have family h//o Fact V Leiden so screening may be offered at New OB visit    KIERA BARR MD      "

## 2018-12-14 ENCOUNTER — PRENATAL OFFICE VISIT (OUTPATIENT)
Dept: OBGYN | Facility: OTHER | Age: 28
End: 2018-12-14
Attending: OBSTETRICS & GYNECOLOGY
Payer: COMMERCIAL

## 2018-12-14 VITALS
BODY MASS INDEX: 23 KG/M2 | SYSTOLIC BLOOD PRESSURE: 110 MMHG | OXYGEN SATURATION: 98 % | HEIGHT: 62 IN | DIASTOLIC BLOOD PRESSURE: 66 MMHG | HEART RATE: 78 BPM | WEIGHT: 125 LBS

## 2018-12-14 DIAGNOSIS — Z86.2 HISTORY OF FACTOR V LEIDEN MUTATION: ICD-10-CM

## 2018-12-14 DIAGNOSIS — Z34.01 ENCOUNTER FOR SUPERVISION OF NORMAL FIRST PREGNANCY IN FIRST TRIMESTER: ICD-10-CM

## 2018-12-14 DIAGNOSIS — O20.0 THREATENED ABORTION: Primary | ICD-10-CM

## 2018-12-14 LAB
ABO + RH BLD: NORMAL
ABO + RH BLD: NORMAL
ALBUMIN UR-MCNC: 10 MG/DL
AMPHETAMINES UR QL SCN: NEGATIVE
APPEARANCE UR: ABNORMAL
BACTERIA #/AREA URNS HPF: ABNORMAL /HPF
BARBITURATES UR QL: NEGATIVE
BENZODIAZ UR QL: NEGATIVE
BILIRUB UR QL STRIP: NEGATIVE
BLD GP AB SCN SERPL QL: NORMAL
BLOOD BANK CMNT PATIENT-IMP: NORMAL
BLOOD BANK CMNT PATIENT-IMP: NORMAL
CANNABINOIDS UR QL SCN: NEGATIVE
COCAINE UR QL: NEGATIVE
COLOR UR AUTO: YELLOW
ERYTHROCYTE [DISTWIDTH] IN BLOOD BY AUTOMATED COUNT: 12 % (ref 10–15)
GLUCOSE UR STRIP-MCNC: NEGATIVE MG/DL
HCT VFR BLD AUTO: 38.7 % (ref 35–47)
HGB BLD-MCNC: 13.2 G/DL (ref 11.7–15.7)
HGB UR QL STRIP: NEGATIVE
KETONES UR STRIP-MCNC: NEGATIVE MG/DL
LEUKOCYTE ESTERASE UR QL STRIP: NEGATIVE
MCH RBC QN AUTO: 30 PG (ref 26.5–33)
MCHC RBC AUTO-ENTMCNC: 34.1 G/DL (ref 31.5–36.5)
MCV RBC AUTO: 88 FL (ref 78–100)
METHADONE UR QL SCN: NEGATIVE
MUCOUS THREADS #/AREA URNS LPF: PRESENT /LPF
NITRATE UR QL: NEGATIVE
OPIATES UR QL SCN: NEGATIVE
PCP UR QL SCN: NEGATIVE
PH UR STRIP: 7.5 PH (ref 4.7–8)
PLATELET # BLD AUTO: 203 10E9/L (ref 150–450)
RBC # BLD AUTO: 4.4 10E12/L (ref 3.8–5.2)
RBC #/AREA URNS AUTO: <1 /HPF (ref 0–2)
SOURCE: ABNORMAL
SP GR UR STRIP: 1.02 (ref 1–1.03)
SPECIMEN EXP DATE BLD: NORMAL
UROBILINOGEN UR STRIP-MCNC: NORMAL MG/DL (ref 0–2)
WBC # BLD AUTO: 5.7 10E9/L (ref 4–11)
WBC #/AREA URNS AUTO: 1 /HPF (ref 0–5)

## 2018-12-14 PROCEDURE — 86780 TREPONEMA PALLIDUM: CPT | Mod: 90 | Performed by: OBSTETRICS & GYNECOLOGY

## 2018-12-14 PROCEDURE — 36415 COLL VENOUS BLD VENIPUNCTURE: CPT | Performed by: OBSTETRICS & GYNECOLOGY

## 2018-12-14 PROCEDURE — 80307 DRUG TEST PRSMV CHEM ANLYZR: CPT | Performed by: OBSTETRICS & GYNECOLOGY

## 2018-12-14 PROCEDURE — 87086 URINE CULTURE/COLONY COUNT: CPT | Performed by: OBSTETRICS & GYNECOLOGY

## 2018-12-14 PROCEDURE — 81241 F5 GENE: CPT | Mod: 90 | Performed by: OBSTETRICS & GYNECOLOGY

## 2018-12-14 PROCEDURE — 99000 SPECIMEN HANDLING OFFICE-LAB: CPT | Performed by: OBSTETRICS & GYNECOLOGY

## 2018-12-14 PROCEDURE — 81001 URINALYSIS AUTO W/SCOPE: CPT | Mod: 59 | Performed by: OBSTETRICS & GYNECOLOGY

## 2018-12-14 PROCEDURE — 86850 RBC ANTIBODY SCREEN: CPT | Performed by: OBSTETRICS & GYNECOLOGY

## 2018-12-14 PROCEDURE — 86762 RUBELLA ANTIBODY: CPT | Mod: 90 | Performed by: OBSTETRICS & GYNECOLOGY

## 2018-12-14 PROCEDURE — 87389 HIV-1 AG W/HIV-1&-2 AB AG IA: CPT | Mod: 90 | Performed by: OBSTETRICS & GYNECOLOGY

## 2018-12-14 PROCEDURE — 86900 BLOOD TYPING SEROLOGIC ABO: CPT | Performed by: OBSTETRICS & GYNECOLOGY

## 2018-12-14 PROCEDURE — 76817 TRANSVAGINAL US OBSTETRIC: CPT | Performed by: OBSTETRICS & GYNECOLOGY

## 2018-12-14 PROCEDURE — 99207 ZZC PRENATAL VISIT: CPT | Mod: 25 | Performed by: OBSTETRICS & GYNECOLOGY

## 2018-12-14 PROCEDURE — 85027 COMPLETE CBC AUTOMATED: CPT | Performed by: OBSTETRICS & GYNECOLOGY

## 2018-12-14 PROCEDURE — 86901 BLOOD TYPING SEROLOGIC RH(D): CPT | Performed by: OBSTETRICS & GYNECOLOGY

## 2018-12-14 PROCEDURE — 87340 HEPATITIS B SURFACE AG IA: CPT | Mod: 90 | Performed by: OBSTETRICS & GYNECOLOGY

## 2018-12-14 ASSESSMENT — MIFFLIN-ST. JEOR: SCORE: 1240.25

## 2018-12-14 ASSESSMENT — PAIN SCALES - GENERAL: PAINLEVEL: NO PAIN (0)

## 2018-12-14 NOTE — PROGRESS NOTES
"  HPI:  Tiffanie Alejo is a 28 year old female  Patient's last menstrual period was 10/22/2018. at 7w4d, Estimated Date of Delivery: 2019.  She denies abdominal pain.   She did have some spotting yesterday.  Was seen for right sided pelvic pain earlier in pregnancy but that has resolved.   No other c/o.    Past Medical History:   Diagnosis Date     Gluten intolerance      Lactose Intolerance 2013     Ovarian cyst 2013     Spondylolisthesis at L5-S1 level     grade 1     Urinary incontinence     urge       Past Surgical History:   Procedure Laterality Date     ADENOIDECTOMY       COLONOSCOPY  2014     ESOPHAGOGASTRODUODENOSCOPY  2014     GI SURGERY  3/7/13    colonoscopy     LAPAROSCOPY DIAGNOSTIC (GENERAL)       TONSILLECTOMY       [place] Teeth Extraction         Allergies: Wheat extract     ROS:   Denies fever, wt loss   Neg /GI other than per HPI      EXAM:  Blood pressure 110/66, pulse 78, height 1.575 m (5' 2\"), weight 56.7 kg (125 lb), last menstrual period 10/22/2018, SpO2 98 %.   BMI= Body mass index is 22.86 kg/m .  General - pleasant female in no acute distress.  Abdomen - soft, nontender, nondistended, no hepatosplenomegaly.  Pelvic - EG: normal adult female, BUS: within normal limits,Rectovaginal - deferred.    US:    Transvaginal:Yes  Yolk sac present:Yes  CRL:  15mm  FCA present:Yes  EGA 7w 6d  IRVING:cwd          ASSESSMENT/PLAN:  (O20.0) Threatened   (primary encounter diagnosis)  Comment: Viable IUP with concordant dates  Plan: TRANSVAGINAL US, OBSTETRIC  (CLINIC OB/GYN         PERFORMED)            (Z34.00) Encounter for supervision of normal first pregnancy  Comment:  H/o anemia and family h/o factor V leiden  Plan: ABO/Rh type and screen, CBC with platelets,         Drug Screen Urine (Range), Hepatitis B surface         antigen, HIV Antigen Antibody Combo, Rubella         Antibody IgG Quantitative, Treponema Abs w         Reflex to RPR and Titer, " UA with Microscopic         reflex to Culture, Urine Culture Aerobic         Bacterial     Factor 5 leiden mutation analysis             1st Trimester precautions and testing discussed.  New OB Labs ordered and exam scheduled.  Aneuploidy testing options discussed.  Patient has my card and phone number to call prn if problems in interim.    Ankush Godinez

## 2018-12-14 NOTE — NURSING NOTE
"Chief Complaint   Patient presents with     Prenatal Care     pre new LMP-10/22/18 US done on 12/3/18, saw Dr. Stuart IRVING- 7/27/19 GA-[id number]       Initial /66 (BP Location: Left arm, Cuff Size: Adult Regular)   Pulse 78   Ht 1.575 m (5' 2\")   Wt 56.7 kg (125 lb)   LMP 10/22/2018   SpO2 98%   BMI 22.86 kg/m   Estimated body mass index is 22.86 kg/m  as calculated from the following:    Height as of this encounter: 1.575 m (5' 2\").    Weight as of this encounter: 56.7 kg (125 lb).  Medication Reconciliation: complete    Eden Quiroz LPN  "

## 2018-12-15 LAB
HBV SURFACE AG SERPL QL IA: NONREACTIVE
HIV 1+2 AB+HIV1 P24 AG SERPL QL IA: NONREACTIVE
RUBV IGG SERPL IA-ACNC: 47 IU/ML
T PALLIDUM AB SER QL: NONREACTIVE

## 2018-12-16 LAB
BACTERIA SPEC CULT: ABNORMAL
SPECIMEN SOURCE: ABNORMAL

## 2018-12-27 LAB — COPATH REPORT: NORMAL

## 2018-12-28 ENCOUNTER — HOSPITAL ENCOUNTER (EMERGENCY)
Facility: HOSPITAL | Age: 28
Discharge: HOME | End: 2018-12-28
Attending: PHYSICIAN ASSISTANT | Admitting: PHYSICIAN ASSISTANT
Payer: COMMERCIAL

## 2018-12-28 VITALS
RESPIRATION RATE: 17 BRPM | TEMPERATURE: 97.2 F | OXYGEN SATURATION: 98 % | DIASTOLIC BLOOD PRESSURE: 62 MMHG | HEART RATE: 70 BPM | SYSTOLIC BLOOD PRESSURE: 97 MMHG

## 2018-12-28 DIAGNOSIS — O21.0 HYPEREMESIS GRAVIDARUM: ICD-10-CM

## 2018-12-28 LAB
ALBUMIN UR-MCNC: NEGATIVE MG/DL
ANION GAP SERPL CALCULATED.3IONS-SCNC: 7 MMOL/L (ref 3–14)
APPEARANCE UR: CLEAR
BASOPHILS # BLD AUTO: 0 10E9/L (ref 0–0.2)
BASOPHILS NFR BLD AUTO: 0.6 %
BILIRUB UR QL STRIP: NEGATIVE
BUN SERPL-MCNC: 6 MG/DL (ref 7–30)
CALCIUM SERPL-MCNC: 8.7 MG/DL (ref 8.5–10.1)
CHLORIDE SERPL-SCNC: 104 MMOL/L (ref 94–109)
CO2 SERPL-SCNC: 25 MMOL/L (ref 20–32)
COLOR UR AUTO: YELLOW
CREAT SERPL-MCNC: 0.59 MG/DL (ref 0.52–1.04)
DIFFERENTIAL METHOD BLD: NORMAL
EOSINOPHIL # BLD AUTO: 0 10E9/L (ref 0–0.7)
EOSINOPHIL NFR BLD AUTO: 0.6 %
ERYTHROCYTE [DISTWIDTH] IN BLOOD BY AUTOMATED COUNT: 12.2 % (ref 10–15)
GFR SERPL CREATININE-BSD FRML MDRD: >90 ML/MIN/{1.73_M2}
GLUCOSE SERPL-MCNC: 100 MG/DL (ref 70–99)
GLUCOSE UR STRIP-MCNC: NEGATIVE MG/DL
HCT VFR BLD AUTO: 40.8 % (ref 35–47)
HGB BLD-MCNC: 14.2 G/DL (ref 11.7–15.7)
HGB UR QL STRIP: NEGATIVE
IMM GRANULOCYTES # BLD: 0 10E9/L (ref 0–0.4)
IMM GRANULOCYTES NFR BLD: 0.3 %
KETONES UR STRIP-MCNC: NEGATIVE MG/DL
LEUKOCYTE ESTERASE UR QL STRIP: NEGATIVE
LYMPHOCYTES # BLD AUTO: 2.2 10E9/L (ref 0.8–5.3)
LYMPHOCYTES NFR BLD AUTO: 34.3 %
MCH RBC QN AUTO: 30.3 PG (ref 26.5–33)
MCHC RBC AUTO-ENTMCNC: 34.8 G/DL (ref 31.5–36.5)
MCV RBC AUTO: 87 FL (ref 78–100)
MONOCYTES # BLD AUTO: 0.4 10E9/L (ref 0–1.3)
MONOCYTES NFR BLD AUTO: 5.6 %
NEUTROPHILS # BLD AUTO: 3.7 10E9/L (ref 1.6–8.3)
NEUTROPHILS NFR BLD AUTO: 58.6 %
NITRATE UR QL: NEGATIVE
NRBC # BLD AUTO: 0 10*3/UL
NRBC BLD AUTO-RTO: 0 /100
PH UR STRIP: 8 PH (ref 4.7–8)
PLATELET # BLD AUTO: 216 10E9/L (ref 150–450)
POTASSIUM SERPL-SCNC: 3.7 MMOL/L (ref 3.4–5.3)
RBC # BLD AUTO: 4.68 10E12/L (ref 3.8–5.2)
SODIUM SERPL-SCNC: 136 MMOL/L (ref 133–144)
SOURCE: NORMAL
SP GR UR STRIP: 1.01 (ref 1–1.03)
UROBILINOGEN UR STRIP-MCNC: NORMAL MG/DL (ref 0–2)
WBC # BLD AUTO: 6.3 10E9/L (ref 4–11)

## 2018-12-28 PROCEDURE — 36415 COLL VENOUS BLD VENIPUNCTURE: CPT | Performed by: PHYSICIAN ASSISTANT

## 2018-12-28 PROCEDURE — 99284 EMERGENCY DEPT VISIT MOD MDM: CPT | Mod: 25

## 2018-12-28 PROCEDURE — 25000132 ZZH RX MED GY IP 250 OP 250 PS 637: Performed by: PHYSICIAN ASSISTANT

## 2018-12-28 PROCEDURE — 80048 BASIC METABOLIC PNL TOTAL CA: CPT | Performed by: PHYSICIAN ASSISTANT

## 2018-12-28 PROCEDURE — 25000128 H RX IP 250 OP 636: Performed by: PHYSICIAN ASSISTANT

## 2018-12-28 PROCEDURE — 99284 EMERGENCY DEPT VISIT MOD MDM: CPT | Mod: Z6 | Performed by: PHYSICIAN ASSISTANT

## 2018-12-28 PROCEDURE — 96361 HYDRATE IV INFUSION ADD-ON: CPT

## 2018-12-28 PROCEDURE — 81003 URINALYSIS AUTO W/O SCOPE: CPT | Performed by: PHYSICIAN ASSISTANT

## 2018-12-28 PROCEDURE — 96374 THER/PROPH/DIAG INJ IV PUSH: CPT

## 2018-12-28 PROCEDURE — 85025 COMPLETE CBC W/AUTO DIFF WBC: CPT | Performed by: PHYSICIAN ASSISTANT

## 2018-12-28 RX ORDER — METOCLOPRAMIDE HYDROCHLORIDE 5 MG/ML
10 INJECTION INTRAMUSCULAR; INTRAVENOUS ONCE
Status: DISCONTINUED | OUTPATIENT
Start: 2018-12-28 | End: 2018-12-28 | Stop reason: HOSPADM

## 2018-12-28 RX ORDER — ACETAMINOPHEN 325 MG/1
650 TABLET ORAL ONCE
Status: COMPLETED | OUTPATIENT
Start: 2018-12-28 | End: 2018-12-28

## 2018-12-28 RX ORDER — METOCLOPRAMIDE 10 MG/1
10 TABLET ORAL
Qty: 8 TABLET | Refills: 0 | Status: SHIPPED | OUTPATIENT
Start: 2018-12-28 | End: 2018-12-28

## 2018-12-28 RX ORDER — DIPHENHYDRAMINE HYDROCHLORIDE 50 MG/ML
25 INJECTION INTRAMUSCULAR; INTRAVENOUS ONCE
Status: COMPLETED | OUTPATIENT
Start: 2018-12-28 | End: 2018-12-28

## 2018-12-28 RX ORDER — METOCLOPRAMIDE 10 MG/1
TABLET ORAL
Qty: 8 TABLET | Refills: 0 | Status: SHIPPED | OUTPATIENT
Start: 2018-12-28 | End: 2019-01-02

## 2018-12-28 RX ADMIN — ACETAMINOPHEN 650 MG: 325 TABLET, FILM COATED ORAL at 11:06

## 2018-12-28 RX ADMIN — SODIUM CHLORIDE 1000 ML: 9 INJECTION, SOLUTION INTRAVENOUS at 09:56

## 2018-12-28 RX ADMIN — DIPHENHYDRAMINE HYDROCHLORIDE 25 MG: 50 INJECTION, SOLUTION INTRAMUSCULAR; INTRAVENOUS at 09:56

## 2018-12-28 ASSESSMENT — ENCOUNTER SYMPTOMS
NEUROLOGICAL NEGATIVE: 1
MYALGIAS: 0
CARDIOVASCULAR NEGATIVE: 1
LIGHT-HEADEDNESS: 0
NAUSEA: 1
FEVER: 0
VOMITING: 1
MUSCULOSKELETAL NEGATIVE: 1
PSYCHIATRIC NEGATIVE: 1
RESPIRATORY NEGATIVE: 1

## 2018-12-28 NOTE — ED AVS SNAPSHOT
[facility]  [address] [address]  Phone:  [phone]                                    Tiffanie Alejo   MRN: [id number]    Department:  [facility]   Date of Visit:  12/28/2018           After Visit Summary Signature Page    I have received my discharge instructions, and my questions have been answered. I have discussed any challenges I see with this plan with the nurse or doctor.    ..........................................................................................................................................  Patient/Patient Representative Signature      ..........................................................................................................................................  Patient Representative Print Name and Relationship to Patient    ..................................................               ................................................  Date                                   Time    ..........................................................................................................................................  Reviewed by Signature/Title    ...................................................              ..............................................  Date                                               Time          22EPIC Rev 08/18

## 2018-12-28 NOTE — ED NOTES
Pt arrived ambulatory after being dropped off by . Per patient OB doctor had her come for some IV fluids

## 2018-12-28 NOTE — ED PROVIDER NOTES
History     Chief Complaint   Patient presents with     Emesis During Pregnancy     vomiting for a week, called OB and he told patient to come in and be seen for IV fluids     HPI  Tiffanie Alejo is a 28 year old female, G1, P0, who presents ambulatory to the emergency department c/o emesis. She is approx 9W 4D. She has been using Unisom, benadryl, yumiko tea with little overall improvement. She is in today as she contacted her OB and they advised she come in for fluids to avoid dehydration. She denies pain, fevers. No LUTS, discharge, bleeding.      Problem List:    Patient Active Problem List    Diagnosis Date Noted     Early stage of pregnancy 12/03/2018     Priority: Medium     Well woman exam with routine gynecological exam 12/10/2013     Priority: Medium     Contraception 12/10/2013     Priority: Medium        Past Medical History:    Past Medical History:   Diagnosis Date     Gluten intolerance      Lactose Intolerance 02/22/2013     Ovarian cyst 02/22/2013     Spondylolisthesis at L5-S1 level      Urinary incontinence        Past Surgical History:    Past Surgical History:   Procedure Laterality Date     ADENOIDECTOMY  2010     COLONOSCOPY  11/4/2014     ESOPHAGOGASTRODUODENOSCOPY  7/2014     GI SURGERY  3/7/13    colonoscopy     LAPAROSCOPY DIAGNOSTIC (GENERAL)       TONSILLECTOMY  2010     [place] Teeth Extraction         Family History:    Family History   Problem Relation Age of Onset     Heart Disease Mother 50        SCAD/AMI 2nd to SCAD     Breast Cancer Mother 54     Family History Negative Father         interstitial lung disease.       Social History:  Marital Status:  Single [1]  Social History     Tobacco Use     Smoking status: Never Smoker     Smokeless tobacco: Never Used   Substance Use Topics     Alcohol use: Yes     Alcohol/week: 1.0 oz     Types: 2 Cans of beer per week     Comment: social - weekly     Drug use: No        Medications:      metoclopramide (REGLAN) 10 MG tablet    Prenatal Vit-Fe Fumarate-FA (PRENATAL MULTIVITAMIN PLUS IRON) 27-0.8 MG TABS per tablet   cholestyramine (QUESTRAN) 4 g Packet   FERROUS GLUCONATE PO         Review of Systems   Constitutional: Negative for fever.   Respiratory: Negative.    Cardiovascular: Negative.    Gastrointestinal: Positive for nausea and vomiting.   Genitourinary: Negative.    Musculoskeletal: Negative.  Negative for myalgias.   Neurological: Negative.  Negative for syncope and light-headedness.   Psychiatric/Behavioral: Negative.        Physical Exam   BP: 113/73  Pulse: 87  Heart Rate: 75  Temp: 97.7  F (36.5  C)  Resp: 18  SpO2: 99 %      Physical Exam   Constitutional: She is oriented to person, place, and time. She appears well-developed and well-nourished. No distress.   HENT:   Mouth/Throat: Oropharynx is clear and moist.   Eyes: Conjunctivae are normal.   Cardiovascular: Normal rate, regular rhythm and normal heart sounds.   Pulmonary/Chest: Effort normal and breath sounds normal.   Abdominal: Soft. Bowel sounds are normal. There is no tenderness.   Neurological: She is alert and oriented to person, place, and time.   Skin: Skin is warm and dry.   Psychiatric: She has a normal mood and affect.   Nursing note and vitals reviewed.      ED Course        Procedures    Results for orders placed or performed during the hospital encounter of 12/28/18 (from the past 24 hour(s))   CBC with platelets differential   Result Value Ref Range    WBC 6.3 4.0 - 11.0 10e9/L    RBC Count 4.68 3.8 - 5.2 10e12/L    Hemoglobin 14.2 11.7 - 15.7 g/dL    Hematocrit 40.8 35.0 - 47.0 %    MCV 87 78 - 100 fl    MCH 30.3 26.5 - 33.0 pg    MCHC 34.8 31.5 - 36.5 g/dL    RDW 12.2 10.0 - 15.0 %    Platelet Count 216 150 - 450 10e9/L    Diff Method Automated Method     % Neutrophils 58.6 %    % Lymphocytes 34.3 %    % Monocytes 5.6 %    % Eosinophils 0.6 %    % Basophils 0.6 %    % Immature Granulocytes 0.3 %    Nucleated RBCs 0 0 /100    Absolute Neutrophil 3.7 1.6  - 8.3 10e9/L    Absolute Lymphocytes 2.2 0.8 - 5.3 10e9/L    Absolute Monocytes 0.4 0.0 - 1.3 10e9/L    Absolute Eosinophils 0.0 0.0 - 0.7 10e9/L    Absolute Basophils 0.0 0.0 - 0.2 10e9/L    Abs Immature Granulocytes 0.0 0 - 0.4 10e9/L    Absolute Nucleated RBC 0.0    Basic metabolic panel   Result Value Ref Range    Sodium 136 133 - 144 mmol/L    Potassium 3.7 3.4 - 5.3 mmol/L    Chloride 104 94 - 109 mmol/L    Carbon Dioxide 25 20 - 32 mmol/L    Anion Gap 7 3 - 14 mmol/L    Glucose 100 (H) 70 - 99 mg/dL    Urea Nitrogen 6 (L) 7 - 30 mg/dL    Creatinine 0.59 0.52 - 1.04 mg/dL    GFR Estimate >90 >60 mL/min/[1.73_m2]    GFR Estimate If Black >90 >60 mL/min/[1.73_m2]    Calcium 8.7 8.5 - 10.1 mg/dL   UA reflex to Microscopic and Culture   Result Value Ref Range    Color Urine Yellow     Appearance Urine Clear     Glucose Urine Negative NEG^Negative mg/dL    Bilirubin Urine Negative NEG^Negative    Ketones Urine Negative NEG^Negative mg/dL    Specific Gravity Urine 1.006 1.003 - 1.035    Blood Urine Negative NEG^Negative    pH Urine 8.0 4.7 - 8.0 pH    Protein Albumin Urine Negative NEG^Negative mg/dL    Urobilinogen mg/dL Normal 0.0 - 2.0 mg/dL    Nitrite Urine Negative NEG^Negative    Leukocyte Esterase Urine Negative NEG^Negative    Source Midstream Urine        Medications   metoclopramide (REGLAN) injection 10 mg (not administered)   0.9% sodium chloride BOLUS (0 mLs Intravenous Stopped 12/28/18 1106)   diphenhydrAMINE (BENADRYL) injection 25 mg (25 mg Intravenous Given 12/28/18 0956)   acetaminophen (TYLENOL) tablet 650 mg (650 mg Oral Given 12/28/18 1106)       Assessments & Plan (with Medical Decision Making)     I have reviewed the nursing notes.  I have reviewed the findings, diagnosis, plan and need for follow up with the patient.       Medication List      Started    metoclopramide 10 MG tablet  Commonly known as:  REGLAN  Take 5-10 mg PO TID as needed for nausea/vomiting.            Final diagnoses:    Hyperemesis gravidarum   Labs acceptable, including urine. Tiffanie received IVF and benadryl with resolution of symptoms. She was able to take PO tylenol for evolving HA and was able to keep this down. Discussed scheduling OTC benadryl. May use reglan sparingly for symptoms not responding to OTCs. Will return here with persistence, worsening, fevers and otherwise f/u with OB as planned. She is agreeable to plan and discharged home in stable condition.     12/28/2018   [facility]     Gamal Gandara PA  12/28/18 5099

## 2018-12-28 NOTE — DISCHARGE INSTRUCTIONS
- Small bland meals  - May schedule the benadryl: 25 to 50 mg orally every four to six hours  - Compazine sparingly if needed  * Back here with any of the red flags on the paperwork - cant keep anything down or feel dehydrated.

## 2018-12-28 NOTE — ED NOTES
Discharge instructions given. Verbalized understanding. Denies any pain at this time and states feels much better. IV removed. Coban applied. Instructed to remove in 30 minutes. Ambulated out of ED independently.

## 2019-01-02 ENCOUNTER — MYC MEDICAL ADVICE (OUTPATIENT)
Dept: OBGYN | Facility: OTHER | Age: 29
End: 2019-01-02

## 2019-01-02 DIAGNOSIS — O21.9 PREGNANCY RELATED NAUSEA AND VOMITING, ANTEPARTUM: Primary | ICD-10-CM

## 2019-01-02 RX ORDER — METOCLOPRAMIDE 10 MG/1
TABLET ORAL
Qty: 30 TABLET | Refills: 1 | Status: SHIPPED | OUTPATIENT
Start: 2019-01-02 | End: 2019-02-15

## 2019-01-17 ENCOUNTER — PRENATAL OFFICE VISIT (OUTPATIENT)
Dept: OBGYN | Facility: OTHER | Age: 29
End: 2019-01-17
Attending: NURSE PRACTITIONER
Payer: COMMERCIAL

## 2019-01-17 VITALS
WEIGHT: 125 LBS | HEART RATE: 83 BPM | BODY MASS INDEX: 23 KG/M2 | OXYGEN SATURATION: 98 % | DIASTOLIC BLOOD PRESSURE: 58 MMHG | HEIGHT: 62 IN | SYSTOLIC BLOOD PRESSURE: 100 MMHG

## 2019-01-17 DIAGNOSIS — Z12.4 SCREENING FOR CERVICAL CANCER: Primary | ICD-10-CM

## 2019-01-17 DIAGNOSIS — Z34.02 NORMAL FIRST PREGNANCY CONFIRMED, CURRENTLY IN SECOND TRIMESTER: ICD-10-CM

## 2019-01-17 DIAGNOSIS — Z34.02 SUPERVISION OF NORMAL FIRST PREGNANCY IN SECOND TRIMESTER: ICD-10-CM

## 2019-01-17 PROCEDURE — 87624 HPV HI-RISK TYP POOLED RSLT: CPT | Mod: 90 | Performed by: NURSE PRACTITIONER

## 2019-01-17 PROCEDURE — 99000 SPECIMEN HANDLING OFFICE-LAB: CPT | Performed by: NURSE PRACTITIONER

## 2019-01-17 PROCEDURE — 87591 N.GONORRHOEAE DNA AMP PROB: CPT | Performed by: NURSE PRACTITIONER

## 2019-01-17 PROCEDURE — 99207 ZZC PRENATAL VISIT: CPT | Performed by: NURSE PRACTITIONER

## 2019-01-17 PROCEDURE — 87491 CHLMYD TRACH DNA AMP PROBE: CPT | Performed by: NURSE PRACTITIONER

## 2019-01-17 PROCEDURE — 88142 CYTOPATH C/V THIN LAYER: CPT | Performed by: NURSE PRACTITIONER

## 2019-01-17 ASSESSMENT — MIFFLIN-ST. JEOR: SCORE: 1240.25

## 2019-01-17 ASSESSMENT — PAIN SCALES - GENERAL: PAINLEVEL: NO PAIN (0)

## 2019-01-17 NOTE — PROGRESS NOTES
"Chief Complaint   Patient presents with     Prenatal Care     New OB 12 weeks 3 days       Initial /58 (BP Location: Right arm, Patient Position: Sitting, Cuff Size: Adult Regular)   Pulse 83   Ht 1.575 m (5' 2\")   Wt 56.7 kg (125 lb)   LMP 10/22/2018   SpO2 98%   BMI 22.86 kg/m   Estimated body mass index is 22.86 kg/m  as calculated from the following:    Height as of this encounter: 1.575 m (5' 2\").    Weight as of this encounter: 56.7 kg (125 lb).  Medication Reconciliation: complete     12 Week Visit    Patient education provided on the following:  Benefits of breastfeeding  Importance of early skin-to-skin contact    We reviewed the [place] [employer] Prenatal Toolkit in the [facility] Resource Book.  Patient questions and concerns addressed and reviewed. Support and encouragement provided.        Patricia Hernández LPN    "

## 2019-01-18 PROBLEM — Z34.02 NORMAL FIRST PREGNANCY CONFIRMED, CURRENTLY IN SECOND TRIMESTER: Status: ACTIVE | Noted: 2019-01-18

## 2019-01-18 LAB
C TRACH DNA SPEC QL PROBE+SIG AMP: NOT DETECTED
N GONORRHOEA DNA SPEC QL PROBE+SIG AMP: NOT DETECTED
SPECIMEN SOURCE: NORMAL

## 2019-01-18 ASSESSMENT — PATIENT HEALTH QUESTIONNAIRE - PHQ9: SUM OF ALL RESPONSES TO PHQ QUESTIONS 1-9: 8

## 2019-01-18 NOTE — PATIENT INSTRUCTIONS
Patient Education     Adapting to Pregnancy: Second Trimester    Keep up the healthy habits you started in your first trimester. You might be a little more tired than normal. So plan your day wisely. Look at the tips below and choose the ones that suit your lifestyle.  If you have any questions, check with your healthcare provider.   If you work  If you can, adjust your work with your employer to fit your needs. Try these tips:    If you stand for long periods, find ways to do some tasks while sitting. Also, try to stand with 1 foot resting on a low stool or ledge. Shift your weight from foot to foot often. Wear low-heeled shoes.    If you sit, keep your knees level with your hips. Rest your feet on a firm surface. Sit tall with support for your low back.    If you work long hours, ask about adjusting your schedule. Try taking shorter breaks more often.  When you travel  The second trimester may be the best time for any travel. Talk to your healthcare provider about any special plans you may need to make. Always:    Wear a seat belt. Fasten the lap part under your belly. Wear the shoulder part also.    Take breaks often during long trips by car or plane. Move around to stretch your legs.    Drink plenty of fluids on flights. The air in plane cabins is very dry.    Avoid hot climates or high altitudes if you are not used to them.    Avoid places where the food and water might make you sick.    Make sure you are up-to-date on all immunizations, including the flu vaccine. This is especially important when traveling overseas.  Taking time to relax  Find time to rest and relax at work or at home:    Take short time-outs daily. Do relaxation exercises.    Breathe deeply during stressful times.    Try not to take on too much. Plan tasks for times when you have the most energy.    Take naps when you can. Or just sit and relax.    After week 16, avoid lying on your back for more than a few minutes. Instead, lie on your  side. Switch sides often.  Continuing as lovers  Unless your healthcare provider tells you otherwise, there is no reason to stop having sex now. Blood supply increases to the pelvic area in the second trimester. Because of this, sex might be more enjoyable. Try different positions and see what s best. Also, talk to your partner about any changes in desire. Spotting may happen after sex. Be sure to let your healthcare provider know if there is heavy bleeding.  Keeping your environment safe  You can still clean house and use scented products. Just take some simple precautions:    Wear gloves when using cleaning fluids.    Open windows to let in fresh air. Use a fan if you paint.    Avoid secondhand smoke.    Don t breathe fumes from nail polish, hair spray, cleansers, or other chemicals.  Date Last Reviewed: 1/1/2018 2000-2018 [employer]. [address] [place] [address]. All rights reserved. This information is not intended as a substitute for professional medical care. Always follow your healthcare professional's instructions.           Patient Education     Pregnancy: Your Second Trimester Changes  Each day, you and your baby are changing and growing together. Here s a quick look at what s happening to both of you.  How you are changing  Even when you don t notice it, your body is adapting to meet the needs of your growing baby. The changes in your body might also affect your moods.  Your body  Your uterus expands as baby grows. As the weeks go by, you will feel more pressure on your bladder, stomach, and other organs. You may notice some skin color changes on your forehead, nose, or cheeks. Freckles may darken, and moles may grow. You may notice a darker line on your abdomen between your belly button and pubic bone in the midline.  Your moods  The second trimester is often easier than the first. Still, be prepared for mood swings. These are due to the increase in hormones (chemicals that  affect the way organs work) produced by your body. These mood swings are a normal part of pregnancy.  How your baby is growing          Month 4  Baby s heartbeat may be heard with a Doppler (hand-held ultrasound device) by 9 to 10 weeks. Eyebrows, eyelashes, and fingernails begin to form.  Month 5  You may feel your baby move. After a growth spurt, your baby nears 10 inches. Month 6  Baby s fingerprints have formed. Your baby weighs about 1 to 2 pounds and is about 12 inches long.   Date Last Reviewed: 1/1/2018 2000-2018 [employer]. [address] [place] [address], [place] [address]. All rights reserved. This information is not intended as a substitute for professional medical care. Always follow your healthcare professional's instructions.           Patient Education     What Is Prenatal Care?    Before becoming pregnant, you may have adopted good health habits to prepare for your baby. But if you didn t, start today. One of the first steps is learning how to take care of yourself. See your healthcare provider as soon as you think you may be pregnant. Then, continue prenatal care throughout your pregnancy.  Prenatal care helps you have a healthy baby  During prenatal care:    Your healthcare provider evaluates the health of your pregnancy. Your healthcare provider will calculate a  due date  that gives an estimate of the delivery of your baby. Many women give birth between 38 and 41 weeks of pregnancy. Your due date is determined by counting 40 weeks from the first day of your last menstrual period.    The progress of your pregnancy is checked. This includes your baby s growth, fetal heart rate, changes in your weight and blood pressure, and your overall health and comfort.    Your healthcare provider may find new concerns and manage existing ones before problems happen.    Your healthcare provider will check lab work through blood and urine.    Your healthcare provider will discuss normal changes that  happen during pregnancy, changes that may not be normal, and appropriate lifestyle changes.    Your healthcare provider will answer your questions and help you prepare for labor and delivery of your baby.  You are part of a team  When you re pregnant, you re part of a team that includes you, your baby, and your healthcare provider. Your team also may include a partner or a main support person. He or she could be a loved one, like a spouse, a family member, or a friend. As you work toward giving your baby a healthy start, rely on your team members for support.  It s not too late to start good habits  What matters most is protecting your baby from this moment on. If you smoke, drink alcohol, or use drugs, now is the time to stop. If you need help, talk with your healthcare provider:    Smoking increases the risk of losing your baby or having a low-birth-weight baby. If you smoke, quit now.    Alcohol and drugs have been linked with miscarriage, birth defects, intellectual disability, and low birth weight. Avoid alcohol and drugs.    Eat a healthy diet. This helps keep you and your baby strong and healthy. Follow your healthcare provider's instructions for nutrition. Also stay within the guidelines you are given for healthy weight gain.    Take 400 micrograms to 800 micrograms (400 mcg to 800 mcg or 0.4 mg to 0.8 mg) of folic acid every day for at least 3 months before getting pregnant to lower your risk of some birth defects of the brain and spine. You can get folic acid from some foods. It is hard to get all of the folic acid you will need from foods alone. Talk with your healthcare provider about taking a folic acid supplement.    Regular exercise will help you stay fit and feel good during pregnancy. It can also help prevent or minimize back pain. Be sure to talk with your healthcare provider about how to exercise safely during pregnancy.    If you have a medical condition, be sure it is under control. Some  "conditions include asthma, diabetes, depression, high blood pressure, obesity, thyroid disease, or epilepsy. Be sure your vaccines are up to date.  Date Last Reviewed: 10/1/2017    5561-5821 The "[employer]. [address] [place] [address], [place] [address]. All rights reserved. This information is not intended as a substitute for professional medical care. Always follow your healthcare professional's instructions.           Patient Education   Best Practices in Breastfeeding   Q: What are \"best practices\" in breastfeeding?   A: The best hospitals strive to follow \"best practices.\" Research shows that human milk offers the best nutrition for babies. Best practices in breastfeeding means the staff cares for moms and babies in a way that promotes successful breastfeeding. Breastfeeding moms succeed more often when the care team:    Teaches all pregnant women about the benefits of breastfeeding.    Helps mothers start breastfeeding within one hour of birth.    Shows mothers how to breastfeed and how to keep their milk supply up, even if they are apart from their babies.    Gives only human milk to  babies. (No other food or drink unless there's a medical need.)    Helps mothers and babies stay together 24 hours a day. (This is called \"rooming in.\")    Encourages frequent breastfeeding, so moms can make plenty of milk.    Does not give pacifiers or bottles to babies who are breastfeeding.    Explains who mothers should call if they need help breastfeeding after they leave the hospital or clinic.  The hospital must also train all members of the care team in the skills they need to follow this policy.   Q: Do all of the [facility] follow these practices?  A: All of our hospitals are adopting best practices in breastfeeding. The steps listed will soon be in place in all hospitals.   Q: Why is breastfeeding so important?  A: Breastfeeding is healthy for both babies and mothers.    Your breast milk is the " perfect food for your baby. It has all the nutrients your baby needs, plus it has antibodies to help your baby fight common infections (like ear infections and pneumonia).    It is convenient and does not cost any money.    It helps protect moms from some kinds of cancer.    It helps prevent some childhood diseases like diabetes and allergies. It can also help protect your baby from Sudden Infant Death Syndrome (SIDS).    It helps moms lose their pregnancy weight faster.  If you are unable to produce enough milk in the hospital, your care team may recommend donor human milk for your baby.  Q: Will I be forced to breastfeed, even if I don't want to?   A: Absolutely not! Not all moms wish to breastfeed, and a very few cannot breastfeed for some reason. We will respect and support your choices.   We will discuss the ways in which breastfeeding is the perfect food for your baby: Any amount of breast milk is great even just one feeding. But if you still prefer to use formula, we will provide the formula and teach you how to feed your baby.   If you breastfeed, it is important to give only human milk and not bottles of formula. This way, your body will make enough milk, and you and your baby will get lots of practice. If you would like to breastfeed but want to try a bottle of formula in the hospital, we will discuss the reasons we do not recommend this .   Q: What else might I notice about best practices in breastfeeding?  A: You will notice the following:    Soon after birth, if you and your baby are able, we will place your baby on your chest for skin-to-skin contact. This helps your baby stay warm and adjust to life outside the womb. If you plan to breastfeed, we will help you and your baby breastfeed within the first hour after birth.    The care team will care for you and your baby in your room, except during medical tests and treatments. This is a critical time for getting to know your baby and learning how to care  for him or her. It is important to have your baby with you while there are plenty of people around to help you.  Staying close to your baby night and day will help you make more milk. Also, studies show that both moms and babies sleep better if they sleep in the same room. Your nurses will help you get the extra rest you need.    We will not give your baby a pacifier unless there is a medical need. Instead, we will teach you many other ways to comfort your baby.   When learning to breastfeed, it is best for babies to satisfy their sucking needs at the breast for at least 2 to 4 weeks after birth. This teaches your baby the correct way to latch onto the breast, and it helps you build a good milk supply.    You'll get a lot of support for breastfeeding. We will also tell you who to call for support after you get home.  Q: What can I do to make breastfeeding a success?  A: Try the ideas below.    Get good information about breastfeeding. Call [employer] at [phone] for details about breastfeeding classes. Some sites offer financial support if you need it.    Tell friends and family about your decision to breastfeed. Ask for their support.    Plan ahead to get help with other tasks after your baby is born. This way, you can focus on breastfeeding, resting and caring for yourself.  For more information, go to www.babyfriendlyusa.org, call your clinic's care team, or speak with a childbirth or breastfeeding educator.  For informational purposes only. Not to replace the advice of your health care provider.   Copyright   2010 [employer]. All rights reserved. [employer] [id number]   REV 07/17.

## 2019-01-18 NOTE — PROGRESS NOTES
"  HPI:  Tiffanie Alejo is a 28 year old female Patient's last menstrual period was 10/22/2018. at 12w4d, Estimated Date of Delivery: Jul 29, 2019.  She denies vaginal bleeding, nausea , vomiting, abdominal pain and headaches. Fatigue improving.    No other c/o.    Past Medical History:   Diagnosis Date     Gluten intolerance      Lactose Intolerance 02/22/2013     Ovarian cyst 02/22/2013     Spondylolisthesis at L5-S1 level     grade 1     Urinary incontinence     urge       Past Surgical History:   Procedure Laterality Date     ADENOIDECTOMY  2010     COLONOSCOPY  11/4/2014     ESOPHAGOGASTRODUODENOSCOPY  7/2014     GI SURGERY  3/7/13    colonoscopy     LAPAROSCOPY DIAGNOSTIC (GENERAL)       TONSILLECTOMY  2010     [place] Teeth Extraction         Allergies: Wheat extract     EXAM:  Blood pressure 100/58, pulse 83, height 1.575 m (5' 2\"), weight 56.7 kg (125 lb), last menstrual period 10/22/2018, SpO2 98 %.   BMI= Body mass index is 22.86 kg/m .  General - pleasant female in no acute distress.  Neck - supple without lymphadenopathy or thyromegaly.  Lungs - clear to auscultation bilaterally.  Heart - regular rate and rhythm without murmur.  Abdomen - soft, nontender, nondistended, no hepatosplenomegaly.  Pelvic - EG: normal adult female, BUS: within normal limits, Vagina: well rugated, no discharge, Cervix: no lesions or CMT, closed/long Uterus: gravid, consistant with dates, mobile, Adnexae: no masses or tenderness.  Rectovaginal - deferred.  Musculoskeletal - no gross deformities.  Neurological - normal strength, sensation, and mental status.    Doptones were 158    ASSESSMENT/PLAN:  (Z12.4) Screening for cervical cancer  (primary encounter diagnosis)  Comment:   Plan: A pap thin layer screen with  HPV - recommended        age 30 - 65 years (select HPV order below), HPV        High Risk Types DNA Cervical            (Z34.02) Supervision of normal first pregnancy in second trimester  Comment:   Plan: " GC/Chlamydia by PCR - [facility]            (Z34.02) Normal first pregnancy confirmed, currently in second trimester  Comment:   Plan: new ob physical and teaching completed.  RTC in 4 weeks.       Weight gain and exercise during pregnancy was discussed at today's visit.  The patient will return to clinic in 4 weeks for continued prenatal care.

## 2019-01-23 LAB
COPATH REPORT: NORMAL
PAP: NORMAL

## 2019-02-15 ENCOUNTER — PRENATAL OFFICE VISIT (OUTPATIENT)
Dept: OBGYN | Facility: OTHER | Age: 29
End: 2019-02-15
Attending: NURSE PRACTITIONER
Payer: COMMERCIAL

## 2019-02-15 VITALS
DIASTOLIC BLOOD PRESSURE: 56 MMHG | HEIGHT: 62 IN | WEIGHT: 131 LBS | HEART RATE: 83 BPM | OXYGEN SATURATION: 100 % | BODY MASS INDEX: 24.11 KG/M2 | SYSTOLIC BLOOD PRESSURE: 102 MMHG

## 2019-02-15 DIAGNOSIS — Z34.02 NORMAL FIRST PREGNANCY CONFIRMED, CURRENTLY IN SECOND TRIMESTER: ICD-10-CM

## 2019-02-15 DIAGNOSIS — O21.9 PREGNANCY RELATED NAUSEA AND VOMITING, ANTEPARTUM: ICD-10-CM

## 2019-02-15 PROCEDURE — 99207 ZZC PRENATAL VISIT: CPT | Performed by: NURSE PRACTITIONER

## 2019-02-15 RX ORDER — MULTIVITAMIN WITH IRON
100 TABLET ORAL DAILY
COMMUNITY
End: 2019-07-26

## 2019-02-15 RX ORDER — METOCLOPRAMIDE 10 MG/1
TABLET ORAL
Qty: 30 TABLET | Refills: 1 | Status: SHIPPED | OUTPATIENT
Start: 2019-02-15 | End: 2019-07-26

## 2019-02-15 ASSESSMENT — PAIN SCALES - GENERAL: PAINLEVEL: NO PAIN (0)

## 2019-02-15 ASSESSMENT — MIFFLIN-ST. JEOR: SCORE: 1267.46

## 2019-02-15 NOTE — NURSING NOTE
"Chief Complaint   Patient presents with     Prenatal Care     16 weeks 4 days       Initial /56 (BP Location: Right arm, Patient Position: Sitting, Cuff Size: Adult Regular)   Pulse 83   Ht 1.575 m (5' 2\")   Wt 59.4 kg (131 lb)   LMP 10/22/2018   SpO2 100%   BMI 23.96 kg/m   Estimated body mass index is 23.96 kg/m  as calculated from the following:    Height as of this encounter: 1.575 m (5' 2\").    Weight as of this encounter: 59.4 kg (131 lb).  Medication Reconciliation: complete    Patricia Hernández LPN    "

## 2019-02-19 NOTE — PATIENT INSTRUCTIONS
Patient Education   Eating Tips for Morning Sickness   Hyperemesis Gravidarum  During pregnancy, you need to eat enough food to meet your needs and the needs of your baby. Severe nausea and vomiting (hyperemesis) may lead to weight loss and dehydration (too little fluid in the body).   Follow these tips to help control nausea.   1. Eat 6 to 8 small meals in a day, about two hours apart.  2. Before rising in the morning, eat a small amount of dry food. Choose from the list below.  ? soda crackers (saltines)  ? dry toast with jelly  ? breadsticks  ? dry cereal  ? rice cakes  ? pretzels  ? plain potatoes, rice or noodles  ? plain low-fat cookies or cake  3. Avoid liquids with meals. Drink liquids 30 to 60 minutes before or after eating. Sip slowly.  4. Foods and drinks should be cool or at room temperature. Try:  ? flavored gelatin  ? sherbet, sorbet or Popsicles  ? carbonated (fizzy) drinks  ? ice cubes made from juice.  5. Avoid hot drinks and foods.  6. Avoid drinks with caffeine (coffee, tea, cola drinks). They may increase stomach acid.  7. Avoid very sweet, hot or spicy foods.  8. Avoid high-fat foods such as butter, margarine, mayonnaise, lopez, gravies, pie crust, pastries and fried foods. They take longer to leave the stomach.  9. Avoid strong food odors such as fish, cabbage or broccoli. Avoid cooking odors by eating food you do not have to cook.  10. Do not lie down after eating. Rest sitting up for an hour after meals.  11. Take your prenatal vitamins with food in the evening. Tell your doctor if you cannot take them.  12. Nausea is often gone by midday. You may eat more food in the late afternoon, supper and mid-evening. Find the times best for you.  Keep a food diary to help you find foods that you can eat without problems. Try any food that appeals to you.  Menu Planning Guidelines     [employer]. Reprinted with permission.  Food groups Foods recommended  Foods that may cause distress    Soups Low-fat  broth-based and cream soups made with allowed foods. All other soups.    Meats and substitutes  (Six or more ounces daily) All lean, tender meats, poultry or fish. All should be baked, broiled or boiled. Boiled egg. Low-fat or fat-free cheeses. Fried meat, poultry or fish; highly seasoned, cured or smoked meat, poultry or fish (i.e., corned beef, luncheon meat, frankfurters, sausages, sardines, anchovies, lopez and strong flavored cheese). Peanut butter.    Fruits (Two or more servings daily; include a vitamin C source daily) Fruit juices, canned fruits, grapefruit and orange sections (without membrane). Other fresh and dried fruits, if tolerated.     Vegetables  (Three or more servings daily) Vegetable juices, cooked vegetables (i.e., asparagus, green or wax beans, beets, carrots, peas, pumpkin, winter squash, spinach and mushrooms). Raw vegetables if tolerated.  Gas-forming vegetables (i.e., dried peas and beans, corn, broccoli, onions, cauliflower, Brilliant sprouts, cucumbers, cabbage, turnip, rutabagas, sauerkraut, green peppers).    Bread, cereal, potato, pasta and grains  (Six or more servings daily) Enriched breads and cereals, plain crackers, potatoes, enriched rice, barley, noodles, spaghetti, macaroni and other pastas. Very coarse cereals such as bran; seeds in or on breads, rolls and crackers; breads made with nuts or dried fruits; fried breads and pastries such as doughnuts; fried potatoes, fried rice, wild rice, seasoned rice and pasta mixes.    Dessert fats Low-fat versions of cakes, cookies, custard, pudding, ice cream, frozen yogurt sherbet; ice pops, gelatin, frozen fruit bars, sorbet. Desserts containing salad dressings, nuts, coconut; high-fat desserts.    Milk and milk products (Four or more cups daily) Fat-free and low-fat milk products. Whole milk, cream.    Beverages   (Four or more cups daily) Water, decaffeinated coffee and tea, fruit drinks, caffeine-free carbonated beverages, weak tea,  "lemonade, sports drinks. All caffeine-containing beverages (i.e., coffee, strong tea, cocoa, cola); alcoholic beverages.    Condiments and sweets Iodized salt, flavorings, low-fat gravies and sauces, herbs and spices as tolerated; sugar, syrup, honey, jelly, seedless jam, hard candies and marshmallows. Strongly flavored seasonings and condiments (i.e., catsup, pepper, barbecue sauce, chili sauce, chili pepper, horseradish, garlic, mustard and vinegar), olives, pickles, nuts, chocolate candy.    For informational purposes only. Not to replace the advice of your health care provider.   Copyright   2006 [facility]. All rights reserved. [employer] 130218 - REV 09/15.       Patient Education   Best Practices in Breastfeeding   Q: What are \"best practices\" in breastfeeding?   A: The best [facility] strive to follow \"best practices.\" Research shows that human milk offers the best nutrition for babies. Best practices in breastfeeding means the staff cares for moms and babies in a way that promotes successful breastfeeding. Breastfeeding moms succeed more often when the care team:    Teaches all pregnant women about the benefits of breastfeeding.    Helps mothers start breastfeeding within one hour of birth.    Shows mothers how to breastfeed and how to keep their milk supply up, even if they are apart from their babies.    Gives only human milk to  babies. (No other food or drink unless there's a medical need.)    Helps mothers and babies stay together 24 hours a day. (This is called \"rooming in.\")    Encourages frequent breastfeeding, so moms can make plenty of milk.    Does not give pacifiers or bottles to babies who are breastfeeding.    Explains who mothers should call if they need help breastfeeding after they leave the hospital or clinic.  The hospital must also train all members of the care team in the skills they need to follow this policy.   Q: Do all of the [facility] follow these " practices?  A: All of our hospitals are adopting best practices in breastfeeding. The steps listed will soon be in place in all hospitals.   Q: Why is breastfeeding so important?  A: Breastfeeding is healthy for both babies and mothers.    Your breast milk is the perfect food for your baby. It has all the nutrients your baby needs, plus it has antibodies to help your baby fight common infections (like ear infections and pneumonia).    It is convenient and does not cost any money.    It helps protect moms from some kinds of cancer.    It helps prevent some childhood diseases like diabetes and allergies. It can also help protect your baby from Sudden Infant Death Syndrome (SIDS).    It helps moms lose their pregnancy weight faster.  If you are unable to produce enough milk in the hospital, your care team may recommend donor human milk for your baby.  Q: Will I be forced to breastfeed, even if I don't want to?   A: Absolutely not! Not all moms wish to breastfeed, and a very few cannot breastfeed for some reason. We will respect and support your choices.   We will discuss the ways in which breastfeeding is the perfect food for your baby: Any amount of breast milk is great--even just one feeding. But if you still prefer to use formula, we will provide the formula and teach you how to feed your baby.   If you breastfeed, it is important to give only human milk and not bottles of formula. This way, your body will make enough milk, and you and your baby will get lots of practice. If you would like to breastfeed but want to try a bottle of formula in the hospital, we will discuss the reasons we do not recommend this .   Q: What else might I notice about best practices in breastfeeding?  A: You will notice the following:    Soon after birth, if you and your baby are able, we will place your baby on your chest for skin-to-skin contact. This helps your baby stay warm and adjust to life outside the womb. If you plan to  breastfeed, we will help you and your baby breastfeed within the first hour after birth.    The care team will care for you and your baby in your room, except during medical tests and treatments. This is a critical time for getting to know your baby and learning how to care for him or her. It is important to have your baby with you while there are plenty of people around to help you.  Staying close to your baby night and day will help you make more milk. Also, studies show that both moms and babies sleep better if they sleep in the same room. Your nurses will help you get the extra rest you need.    We will not give your baby a pacifier unless there is a medical need. Instead, we will teach you many other ways to comfort your baby.   When learning to breastfeed, it is best for babies to satisfy their sucking needs at the breast for at least 2 to 4 weeks after birth. This teaches your baby the correct way to latch onto the breast, and it helps you build a good milk supply.    You'll get a lot of support for breastfeeding. We will also tell you who to call for support after you get home.  Q: What can I do to make breastfeeding a success?  A: Try the ideas below.    Get good information about breastfeeding. Call [employer] at [phone] for details about breastfeeding classes. Some sites offer financial support if you need it.    Tell friends and family about your decision to breastfeed. Ask for their support.    Plan ahead to get help with other tasks after your baby is born. This way, you can focus on breastfeeding, resting and caring for yourself.  For more information, go to [email or web address], call your clinic's care team, or speak with a childbirth or breastfeeding educator.  For informational purposes only. Not to replace the advice of your health care provider.   Copyright   2010 [employer]. All rights reserved. [employer] 173591 - REV 07/17.       Patient Education      Adapting to Pregnancy: Second Trimester    Keep up the healthy habits you started in your first trimester. You might be a little more tired than normal. So plan your day wisely. Look at the tips below and choose the ones that suit your lifestyle.  If you have any questions, check with your healthcare provider.   If you work  If you can, adjust your work with your employer to fit your needs. Try these tips:    If you stand for long periods, find ways to do some tasks while sitting. Also, try to stand with 1 foot resting on a low stool or ledge. Shift your weight from foot to foot often. Wear low-heeled shoes.    If you sit, keep your knees level with your hips. Rest your feet on a firm surface. Sit tall with support for your low back.    If you work long hours, ask about adjusting your schedule. Try taking shorter breaks more often.  When you travel  The second trimester may be the best time for any travel. Talk to your healthcare provider about any special plans you may need to make. Always:    Wear a seat belt. Fasten the lap part under your belly. Wear the shoulder part also.    Take breaks often during long trips by car or plane. Move around to stretch your legs.    Drink plenty of fluids on flights. The air in plane cabins is very dry.    Avoid hot climates or high altitudes if you are not used to them.    Avoid places where the food and water might make you sick.    Make sure you are up-to-date on all immunizations, including the flu vaccine. This is especially important when traveling overseas.  Taking time to relax  Find time to rest and relax at work or at home:    Take short time-outs daily. Do relaxation exercises.    Breathe deeply during stressful times.    Try not to take on too much. Plan tasks for times when you have the most energy.    Take naps when you can. Or just sit and relax.    After week 16, avoid lying on your back for more than a few minutes. Instead, lie on your side. Switch sides  often.  Continuing as lovers  Unless your healthcare provider tells you otherwise, there is no reason to stop having sex now. Blood supply increases to the pelvic area in the second trimester. Because of this, sex might be more enjoyable. Try different positions and see what s best. Also, talk to your partner about any changes in desire. Spotting may happen after sex. Be sure to let your healthcare provider know if there is heavy bleeding.  Keeping your environment safe  You can still clean house and use scented products. Just take some simple precautions:    Wear gloves when using cleaning fluids.    Open windows to let in fresh air. Use a fan if you paint.    Avoid secondhand smoke.    Don t breathe fumes from nail polish, hair spray, cleansers, or other chemicals.  Date Last Reviewed: 1/1/2018 2000-2018 [employer] [address] [place] [address], [place] [address]. All rights reserved. This information is not intended as a substitute for professional medical care. Always follow your healthcare professional's instructions.

## 2019-02-19 NOTE — PROGRESS NOTES
Doing well.  Denies cramping or spotting.  Declines screens.  20 week anatomy scan discussed and ordered.  Second trimester changes discussed. Requesting refill of her zofran.   RTC in 4 weeks.

## 2019-03-15 ENCOUNTER — HOSPITAL ENCOUNTER (OUTPATIENT)
Dept: ULTRASOUND IMAGING | Facility: HOSPITAL | Age: 29
Discharge: HOME | End: 2019-03-15
Attending: NURSE PRACTITIONER | Admitting: NURSE PRACTITIONER
Payer: COMMERCIAL

## 2019-03-15 ENCOUNTER — PRENATAL OFFICE VISIT (OUTPATIENT)
Dept: OBGYN | Facility: OTHER | Age: 29
End: 2019-03-15
Attending: OBSTETRICS & GYNECOLOGY
Payer: COMMERCIAL

## 2019-03-15 VITALS
HEIGHT: 62 IN | SYSTOLIC BLOOD PRESSURE: 100 MMHG | WEIGHT: 132 LBS | DIASTOLIC BLOOD PRESSURE: 62 MMHG | BODY MASS INDEX: 24.29 KG/M2

## 2019-03-15 DIAGNOSIS — Z34.02 NORMAL FIRST PREGNANCY CONFIRMED, CURRENTLY IN SECOND TRIMESTER: ICD-10-CM

## 2019-03-15 DIAGNOSIS — R82.4 URINE KETONE: Primary | ICD-10-CM

## 2019-03-15 DIAGNOSIS — R30.0 DYSURIA: Primary | ICD-10-CM

## 2019-03-15 LAB
ALBUMIN UR-MCNC: NEGATIVE MG/DL
APPEARANCE UR: CLEAR
BACTERIA #/AREA URNS HPF: ABNORMAL /HPF
BILIRUB UR QL STRIP: NEGATIVE
COLOR UR AUTO: ABNORMAL
GLUCOSE UR STRIP-MCNC: NEGATIVE MG/DL
HGB UR QL STRIP: NEGATIVE
KETONES UR STRIP-MCNC: 40 MG/DL
LEUKOCYTE ESTERASE UR QL STRIP: NEGATIVE
MUCOUS THREADS #/AREA URNS LPF: PRESENT /LPF
NITRATE UR QL: NEGATIVE
PH UR STRIP: 6 PH (ref 4.7–8)
RBC #/AREA URNS AUTO: 0 /HPF (ref 0–2)
SOURCE: ABNORMAL
SP GR UR STRIP: 1.01 (ref 1–1.03)
UROBILINOGEN UR STRIP-MCNC: NORMAL MG/DL (ref 0–2)
WBC #/AREA URNS AUTO: 1 /HPF (ref 0–5)

## 2019-03-15 PROCEDURE — 81001 URINALYSIS AUTO W/SCOPE: CPT | Performed by: OBSTETRICS & GYNECOLOGY

## 2019-03-15 PROCEDURE — 99207 ZZC PRENATAL VISIT: CPT | Performed by: OBSTETRICS & GYNECOLOGY

## 2019-03-15 PROCEDURE — 76805 OB US >/= 14 WKS SNGL FETUS: CPT | Mod: TC

## 2019-03-15 ASSESSMENT — PAIN SCALES - GENERAL: PAINLEVEL: NO PAIN (0)

## 2019-03-15 ASSESSMENT — MIFFLIN-ST. JEOR: SCORE: 1272

## 2019-03-15 NOTE — PROGRESS NOTES
Doing well.  No concerns today except polydipsia.  .  Denies PTL sx, vb, lof.  UA ordered.   Reminded of upcoming labs including 1 hour GTT  Reviewed recent US-no concerns with anatomical survey.  Return to clinic in 4 weeks    Ankush Godinez MD  3/15/2019

## 2019-03-19 DIAGNOSIS — R82.4 URINE KETONE: ICD-10-CM

## 2019-03-19 LAB — GLUCOSE SERPL-MCNC: 75 MG/DL (ref 70–99)

## 2019-03-19 PROCEDURE — 36415 COLL VENOUS BLD VENIPUNCTURE: CPT | Performed by: OBSTETRICS & GYNECOLOGY

## 2019-03-19 PROCEDURE — 82947 ASSAY GLUCOSE BLOOD QUANT: CPT | Performed by: OBSTETRICS & GYNECOLOGY

## 2019-03-29 ENCOUNTER — MYC MEDICAL ADVICE (OUTPATIENT)
Dept: OBGYN | Facility: OTHER | Age: 29
End: 2019-03-29

## 2019-04-01 ENCOUNTER — TELEPHONE (OUTPATIENT)
Dept: OBGYN | Facility: OTHER | Age: 29
End: 2019-04-01

## 2019-04-01 NOTE — TELEPHONE ENCOUNTER
Pt called back, reports that she had an episode of profuse sweating, dizziness, increased pulse. States that her coworker got her to eat some candy and she started to feel better. Pt is very concerned that she has gestational diabetes or hypoglycemia. Pt reports that she is feeling fine now but would like to check in with OB nurse. Please call pt back at [phone].

## 2019-04-01 NOTE — TELEPHONE ENCOUNTER
Patient called stating she had a episode and wanted to talk to Dr. Godinez's nurse. Patient was informed Dr. Godinez and nurse out tell 04/02/19. She asked for the nurse to give her a call when they are in the office.    Jossy please call patient.    Thank you

## 2019-04-02 NOTE — TELEPHONE ENCOUNTER
OK discuss at appt.  Make sure she is drinking plenty of fluids and keep snacks with her if any recurrent type episodes.  Dizzy spells or episodes of low blood sugar an hour or so after eating are more common at this point of pregnancy.  Diabetes testing scheduled with her next appt.

## 2019-04-12 ENCOUNTER — PRENATAL OFFICE VISIT (OUTPATIENT)
Dept: OBGYN | Facility: OTHER | Age: 29
End: 2019-04-12
Attending: OBSTETRICS & GYNECOLOGY
Payer: COMMERCIAL

## 2019-04-12 VITALS
DIASTOLIC BLOOD PRESSURE: 60 MMHG | HEIGHT: 62 IN | BODY MASS INDEX: 25.95 KG/M2 | WEIGHT: 141 LBS | SYSTOLIC BLOOD PRESSURE: 102 MMHG

## 2019-04-12 DIAGNOSIS — Z34.02 NORMAL FIRST PREGNANCY CONFIRMED, CURRENTLY IN SECOND TRIMESTER: ICD-10-CM

## 2019-04-12 PROCEDURE — 99207 ZZC PRENATAL VISIT: CPT | Performed by: OBSTETRICS & GYNECOLOGY

## 2019-04-12 PROCEDURE — G0463 HOSPITAL OUTPT CLINIC VISIT: HCPCS | Performed by: OBSTETRICS & GYNECOLOGY

## 2019-04-12 RX ORDER — DOCUSATE SODIUM 100 MG/1
100 CAPSULE, LIQUID FILLED ORAL 2 TIMES DAILY
COMMUNITY
End: 2020-02-18

## 2019-04-12 ASSESSMENT — MIFFLIN-ST. JEOR: SCORE: 1312.82

## 2019-04-12 ASSESSMENT — PAIN SCALES - GENERAL: PAINLEVEL: NO PAIN (0)

## 2019-04-12 NOTE — PROGRESS NOTES
Doing well.  No concerns today except slipped and fell earlier.  Hit buttocks, not abdomen.  Denies abdominal pain/ctx/bleeding.    Denies PTL sx, lof  Reminded of upcoming labs including 1 hour GTT  Reviewed recent US-no concerns with anatomical survey.  Return to clinic in 3-4 weeks (labs/tdap discussed)    Ankush Godinez MD  4/12/2019

## 2019-04-12 NOTE — NURSING NOTE
"Chief Complaint   Patient presents with     Prenatal Care       Initial /60 (BP Location: Left arm, Patient Position: Sitting, Cuff Size: Adult Regular)   Ht 1.575 m (5' 2\")   Wt 64 kg (141 lb)   LMP 10/22/2018   BMI 25.79 kg/m   Estimated body mass index is 25.79 kg/m  as calculated from the following:    Height as of this encounter: 1.575 m (5' 2\").    Weight as of this encounter: 64 kg (141 lb).  Medication Reconciliation: complete    JAIRO MCCALL LPN    "

## 2019-04-23 ENCOUNTER — MYC MEDICAL ADVICE (OUTPATIENT)
Dept: OBGYN | Facility: OTHER | Age: 29
End: 2019-04-23

## 2019-05-03 ENCOUNTER — PRENATAL OFFICE VISIT (OUTPATIENT)
Dept: OBGYN | Facility: OTHER | Age: 29
End: 2019-05-03
Attending: OBSTETRICS & GYNECOLOGY
Payer: COMMERCIAL

## 2019-05-03 VITALS
HEIGHT: 62 IN | WEIGHT: 147.5 LBS | BODY MASS INDEX: 27.14 KG/M2 | SYSTOLIC BLOOD PRESSURE: 102 MMHG | HEART RATE: 93 BPM | DIASTOLIC BLOOD PRESSURE: 70 MMHG | OXYGEN SATURATION: 95 %

## 2019-05-03 DIAGNOSIS — Z34.02 NORMAL FIRST PREGNANCY CONFIRMED, CURRENTLY IN SECOND TRIMESTER: ICD-10-CM

## 2019-05-03 DIAGNOSIS — Z23 NEED FOR VACCINATION: Primary | ICD-10-CM

## 2019-05-03 LAB
ERYTHROCYTE [DISTWIDTH] IN BLOOD BY AUTOMATED COUNT: 13.1 % (ref 10–15)
GLUCOSE 1H P 50 G GLC PO SERPL-MCNC: 128 MG/DL (ref 60–129)
HCT VFR BLD AUTO: 33.4 % (ref 35–47)
HGB BLD-MCNC: 11.3 G/DL (ref 11.7–15.7)
MCH RBC QN AUTO: 30.5 PG (ref 26.5–33)
MCHC RBC AUTO-ENTMCNC: 33.8 G/DL (ref 31.5–36.5)
MCV RBC AUTO: 90 FL (ref 78–100)
PLATELET # BLD AUTO: 166 10E9/L (ref 150–450)
RBC # BLD AUTO: 3.7 10E12/L (ref 3.8–5.2)
WBC # BLD AUTO: 9.4 10E9/L (ref 4–11)

## 2019-05-03 PROCEDURE — 90471 IMMUNIZATION ADMIN: CPT | Performed by: OBSTETRICS & GYNECOLOGY

## 2019-05-03 PROCEDURE — 99207 ZZC PRENATAL VISIT: CPT | Mod: 25 | Performed by: OBSTETRICS & GYNECOLOGY

## 2019-05-03 PROCEDURE — 85027 COMPLETE CBC AUTOMATED: CPT | Performed by: OBSTETRICS & GYNECOLOGY

## 2019-05-03 PROCEDURE — 36415 COLL VENOUS BLD VENIPUNCTURE: CPT | Performed by: OBSTETRICS & GYNECOLOGY

## 2019-05-03 PROCEDURE — 86850 RBC ANTIBODY SCREEN: CPT | Performed by: OBSTETRICS & GYNECOLOGY

## 2019-05-03 PROCEDURE — 90715 TDAP VACCINE 7 YRS/> IM: CPT | Performed by: OBSTETRICS & GYNECOLOGY

## 2019-05-03 PROCEDURE — 82950 GLUCOSE TEST: CPT | Performed by: OBSTETRICS & GYNECOLOGY

## 2019-05-03 ASSESSMENT — MIFFLIN-ST. JEOR: SCORE: 1342.31

## 2019-05-03 ASSESSMENT — PAIN SCALES - GENERAL: PAINLEVEL: NO PAIN (0)

## 2019-05-03 NOTE — PROGRESS NOTES
Doing well.  No concerns today.  1 hour GTT done today along with other necessary labs.  Prenatal flowsheet information is reviewed.  Discussed kick counts and fetal movement.  RTC in 2 weeks  TDAP done    Denies PTL vanessa, jordana, jacki Godinez MD  5/3/2019

## 2019-05-03 NOTE — NURSING NOTE
"Chief Complaint   Patient presents with     Prenatal Care     27w4d       Initial /70 (BP Location: Left arm, Cuff Size: Adult Regular)   Pulse 93   Ht 1.575 m (5' 2\")   Wt 66.9 kg (147 lb 8 oz)   LMP 10/22/2018   SpO2 95%   BMI 26.98 kg/m   Estimated body mass index is 26.98 kg/m  as calculated from the following:    Height as of this encounter: 1.575 m (5' 2\").    Weight as of this encounter: 66.9 kg (147 lb 8 oz).  Medication Reconciliation: complete    Cece Dawkins LPN  "

## 2019-05-05 LAB — BLD GP AB SCN SERPL QL: NORMAL

## 2019-05-17 ENCOUNTER — PRENATAL OFFICE VISIT (OUTPATIENT)
Dept: OBGYN | Facility: OTHER | Age: 29
End: 2019-05-17
Attending: NURSE PRACTITIONER
Payer: COMMERCIAL

## 2019-05-17 VITALS
SYSTOLIC BLOOD PRESSURE: 98 MMHG | WEIGHT: 149 LBS | BODY MASS INDEX: 27.42 KG/M2 | HEIGHT: 62 IN | DIASTOLIC BLOOD PRESSURE: 66 MMHG

## 2019-05-17 DIAGNOSIS — Z34.03 NORMAL FIRST PREGNANCY CONFIRMED, CURRENTLY IN THIRD TRIMESTER: ICD-10-CM

## 2019-05-17 PROCEDURE — 99207 ZZC PRENATAL VISIT: CPT | Performed by: NURSE PRACTITIONER

## 2019-05-17 ASSESSMENT — MIFFLIN-ST. JEOR: SCORE: 1349.11

## 2019-05-17 ASSESSMENT — PAIN SCALES - GENERAL: PAINLEVEL: NO PAIN (0)

## 2019-05-17 NOTE — PATIENT INSTRUCTIONS
"  Patient Education   Best Practices in Breastfeeding   Q: What are \"best practices\" in breastfeeding?   A: The best hospitals strive to follow \"best practices.\" Research shows that human milk offers the best nutrition for babies. Best practices in breastfeeding means the staff cares for moms and babies in a way that promotes successful breastfeeding. Breastfeeding moms succeed more often when the care team:    Teaches all pregnant women about the benefits of breastfeeding.    Helps mothers start breastfeeding within one hour of birth.    Shows mothers how to breastfeed and how to keep their milk supply up, even if they are apart from their babies.    Gives only human milk to  babies. (No other food or drink unless there's a medical need.)    Helps mothers and babies stay together 24 hours a day. (This is called \"rooming in.\")    Encourages frequent breastfeeding, so moms can make plenty of milk.    Does not give pacifiers or bottles to babies who are breastfeeding.    Explains who mothers should call if they need help breastfeeding after they leave the hospital or clinic.  The hospital must also train all members of the care team in the skills they need to follow this policy.   Q: Do all of the [facility] follow these practices?  A: All of our hospitals are adopting best practices in breastfeeding. The steps listed will soon be in place in all hospitals.   Q: Why is breastfeeding so important?  A: Breastfeeding is healthy for both babies and mothers.    Your breast milk is the perfect food for your baby. It has all the nutrients your baby needs, plus it has antibodies to help your baby fight common infections (like ear infections and pneumonia).    It is convenient and does not cost any money.    It helps protect moms from some kinds of cancer.    It helps prevent some childhood diseases like diabetes and allergies. It can also help protect your baby from Sudden Infant Death Syndrome (SIDS).    It " helps moms lose their pregnancy weight faster.  If you are unable to produce enough milk in the hospital, your care team may recommend donor human milk for your baby.  Q: Will I be forced to breastfeed, even if I don't want to?   A: Absolutely not! Not all moms wish to breastfeed, and a very few cannot breastfeed for some reason. We will respect and support your choices.   We will discuss the ways in which breastfeeding is the perfect food for your baby: Any amount of breast milk is great--even just one feeding. But if you still prefer to use formula, we will provide the formula and teach you how to feed your baby.   If you breastfeed, it is important to give only human milk and not bottles of formula. This way, your body will make enough milk, and you and your baby will get lots of practice. If you would like to breastfeed but want to try a bottle of formula in the hospital, we will discuss the reasons we do not recommend this .   Q: What else might I notice about best practices in breastfeeding?  A: You will notice the following:    Soon after birth, if you and your baby are able, we will place your baby on your chest for skin-to-skin contact. This helps your baby stay warm and adjust to life outside the womb. If you plan to breastfeed, we will help you and your baby breastfeed within the first hour after birth.    The care team will care for you and your baby in your room, except during medical tests and treatments. This is a critical time for getting to know your baby and learning how to care for him or her. It is important to have your baby with you while there are plenty of people around to help you.  Staying close to your baby night and day will help you make more milk. Also, studies show that both moms and babies sleep better if they sleep in the same room. Your nurses will help you get the extra rest you need.    We will not give your baby a pacifier unless there is a medical need. Instead, we will teach  you many other ways to comfort your baby.   When learning to breastfeed, it is best for babies to satisfy their sucking needs at the breast for at least 2 to 4 weeks after birth. This teaches your baby the correct way to latch onto the breast, and it helps you build a good milk supply.    You'll get a lot of support for breastfeeding. We will also tell you who to call for support after you get home.  Q: What can I do to make breastfeeding a success?  A: Try the ideas below.    Get good information about breastfeeding. Call [employer] at [phone] for details about breastfeeding classes. Some sites offer financial support if you need it.    Tell friends and family about your decision to breastfeed. Ask for their support.    Plan ahead to get help with other tasks after your baby is born. This way, you can focus on breastfeeding, resting and caring for yourself.  For more information, go to [email or web address], call your clinic's care team, or speak with a childbirth or breastfeeding educator.  For informational purposes only. Not to replace the advice of your health care provider.   Copyright   2010 [employer]. All rights reserved. [employer] 015373 - REV 07/17.       Patient Education     Kick Counts    It s normal to worry about your baby s health. One way you can know your baby s doing well is to record the baby s movements once a day. This is called a kick count. Remember to take your kick count records to all your appointments with your healthcare provider.  How to count kicks  Here are tips for counting kicks:    Choose a time when the baby is active, such as after a meal.     Sit comfortably or lie on your side.     The first time the baby moves, write down the time.     Count each movement until the baby has moved 10 times. This can take from 20 minutes to 2 hours.     Try to do it at the same time each day.  When to call your healthcare provider  Call your healthcare  provider right away if you notice any of the following:    Your baby moves fewer than 10 times in 2 hours while you re doing kick counts.    Your baby moves much less often than on the days before.    You have not felt your baby move all day.  Date Last Reviewed: 12/1/2017 2000-2018 [employer]. [address] [place] [address]. All rights reserved. This information is not intended as a substitute for professional medical care. Always follow your healthcare professional's instructions.           Patient Education     Pregnancy: Your Third Trimester Changes  As the baby grows, your body changes too. You may also see signs that your body is getting ready for labor. Be patient. Within a few more weeks, your baby will be born.  How you are changing  Your body is preparing for the birth of your baby. Some of the most common changes are listed below. If you have any questions or concerns, ask your healthcare provider:    You ll gain more weight from fluids, extra blood, and fat deposits.    Your breasts will grow as your body gets ready to feed the baby. They may be more tender. You may also notice a slight yellow or white discharge from the nipples.    Discharge from your vagina may increase. This is normal.    You might see some skin color changes on your forehead, cheeks, or nose. Most of these will go away after you deliver.  How your baby is growing       Month 7  Your baby can open and close his or her eyes and weighs around 4 pounds. If born prematurely (too early), your baby would likely survive with special care. Month 8  Your baby is building up body fat and weighs around 6 pounds. Month 9  Your baby weighs nearly 7 pounds and is about 19 to 21 inches long. In other words, any day now...   Date Last Reviewed: 2/1/2018 2000-2018 [employer]. [address] [place] [address]. All rights reserved. This information is not intended as a substitute for professional  medical care. Always follow your healthcare professional's instructions.

## 2019-05-17 NOTE — NURSING NOTE
"Chief Complaint   Patient presents with     Prenatal Care     29w4d       Initial BP 98/66 (BP Location: Right arm, Cuff Size: Adult Regular)   Ht 1.575 m (5' 2\")   Wt 67.6 kg (149 lb)   LMP 10/22/2018   BMI 27.25 kg/m   Estimated body mass index is 27.25 kg/m  as calculated from the following:    Height as of this encounter: 1.575 m (5' 2\").    Weight as of this encounter: 67.6 kg (149 lb).  Medication Reconciliation: complete    Cece Dawkins LPN  "

## 2019-05-17 NOTE — PROGRESS NOTES
Doing well.  Answered questions on BF, postpartum, and travel. Plans to attend a wedding in [facility] at 33 weeks.  Food / water safety and travel precautions discussed.   Discussed kick counts.  28 week labs reviewed.  RTC in 2 weeks.

## 2019-05-31 ENCOUNTER — PRENATAL OFFICE VISIT (OUTPATIENT)
Dept: OBGYN | Facility: OTHER | Age: 29
End: 2019-05-31
Attending: OBSTETRICS & GYNECOLOGY
Payer: COMMERCIAL

## 2019-05-31 VITALS
HEIGHT: 62 IN | DIASTOLIC BLOOD PRESSURE: 64 MMHG | SYSTOLIC BLOOD PRESSURE: 110 MMHG | WEIGHT: 146 LBS | BODY MASS INDEX: 26.87 KG/M2

## 2019-05-31 DIAGNOSIS — Z34.02 NORMAL FIRST PREGNANCY CONFIRMED, CURRENTLY IN SECOND TRIMESTER: ICD-10-CM

## 2019-05-31 PROCEDURE — 99207 ZZC PRENATAL VISIT: CPT | Performed by: OBSTETRICS & GYNECOLOGY

## 2019-05-31 ASSESSMENT — MIFFLIN-ST. JEOR: SCORE: 1335.5

## 2019-05-31 ASSESSMENT — PAIN SCALES - GENERAL: PAINLEVEL: NO PAIN (0)

## 2019-05-31 NOTE — PROGRESS NOTES
Doing well.  No concerns today.  Prenatal flowsheet information is reviewed.  Discussed PTL, PROM, and when to call or come in.  Discussed kick counts and fetal movement.  RTC in 2 weeks  Denies PTL jordana mendez, jacki Godinez MD  5/31/2019

## 2019-05-31 NOTE — NURSING NOTE
"Chief Complaint   Patient presents with     Prenatal Care       Initial /64 (BP Location: Left arm, Patient Position: Sitting, Cuff Size: Adult Regular)   Ht 1.575 m (5' 2\")   Wt 66.2 kg (146 lb)   LMP 10/22/2018   BMI 26.70 kg/m   Estimated body mass index is 26.7 kg/m  as calculated from the following:    Height as of this encounter: 1.575 m (5' 2\").    Weight as of this encounter: 66.2 kg (146 lb).  Medication Reconciliation: complete    JAIRO MCCALL LPN    "

## 2019-06-14 ENCOUNTER — PRENATAL OFFICE VISIT (OUTPATIENT)
Dept: OBGYN | Facility: OTHER | Age: 29
End: 2019-06-14
Attending: NURSE PRACTITIONER
Payer: COMMERCIAL

## 2019-06-14 VITALS
DIASTOLIC BLOOD PRESSURE: 72 MMHG | BODY MASS INDEX: 27.6 KG/M2 | OXYGEN SATURATION: 99 % | HEIGHT: 62 IN | WEIGHT: 150 LBS | HEART RATE: 100 BPM | SYSTOLIC BLOOD PRESSURE: 108 MMHG

## 2019-06-14 DIAGNOSIS — Z34.03 NORMAL FIRST PREGNANCY CONFIRMED, CURRENTLY IN THIRD TRIMESTER: Primary | ICD-10-CM

## 2019-06-14 PROCEDURE — 99207 ZZC PRENATAL VISIT: CPT | Performed by: NURSE PRACTITIONER

## 2019-06-14 ASSESSMENT — PAIN SCALES - GENERAL: PAINLEVEL: NO PAIN (0)

## 2019-06-14 ASSESSMENT — MIFFLIN-ST. JEOR: SCORE: 1353.65

## 2019-06-14 NOTE — PROGRESS NOTES
"Chief Complaint   Patient presents with     Prenatal Care     33 weeks 4 days       Initial /72 (BP Location: Right arm, Patient Position: Sitting, Cuff Size: Adult Regular)   Pulse 100   Ht 1.575 m (5' 2\")   Wt 68 kg (150 lb)   LMP 10/22/2018   SpO2 99%   BMI 27.44 kg/m   Estimated body mass index is 27.44 kg/m  as calculated from the following:    Height as of this encounter: 1.575 m (5' 2\").    Weight as of this encounter: 68 kg (150 lb).  Medication Reconciliation: complete     Patricia Hernández          "

## 2019-06-18 NOTE — PATIENT INSTRUCTIONS
Patient Education     Kick Counts    It s normal to worry about your baby s health. One way you can know your baby s doing well is to record the baby s movements once a day. This is called a kick count. Remember to take your kick count records to all your appointments with your healthcare provider.  How to count kicks  Here are tips for counting kicks:    Choose a time when the baby is active, such as after a meal.     Sit comfortably or lie on your side.     The first time the baby moves, write down the time.     Count each movement until the baby has moved 10 times. This can take from 20 minutes to 2 hours.     Try to do it at the same time each day.  When to call your healthcare provider  Call your healthcare provider right away if you notice any of the following:    Your baby moves fewer than 10 times in 2 hours while you re doing kick counts.    Your baby moves much less often than on the days before.    You have not felt your baby move all day.  Date Last Reviewed: 12/1/2017 2000-2018 [employer]. [address] [place] [address], [place] [address]. All rights reserved. This information is not intended as a substitute for professional medical care. Always follow your healthcare professional's instructions.

## 2019-06-18 NOTE — PROGRESS NOTES
Doing well.  Denies concerns.  Baby active.  No cramping or hisan.  Plan GBS next visit.  Reviewed kick counts.  RTC in 2 weeks.

## 2019-06-28 ENCOUNTER — PRENATAL OFFICE VISIT (OUTPATIENT)
Dept: OBGYN | Facility: OTHER | Age: 29
End: 2019-06-28
Attending: OBSTETRICS & GYNECOLOGY
Payer: COMMERCIAL

## 2019-06-28 VITALS
DIASTOLIC BLOOD PRESSURE: 60 MMHG | SYSTOLIC BLOOD PRESSURE: 105 MMHG | OXYGEN SATURATION: 97 % | HEART RATE: 101 BPM | HEIGHT: 62 IN | WEIGHT: 152 LBS | BODY MASS INDEX: 27.97 KG/M2

## 2019-06-28 DIAGNOSIS — Z34.03 NORMAL FIRST PREGNANCY CONFIRMED, CURRENTLY IN THIRD TRIMESTER: ICD-10-CM

## 2019-06-28 PROCEDURE — 87653 STREP B DNA AMP PROBE: CPT | Performed by: OBSTETRICS & GYNECOLOGY

## 2019-06-28 PROCEDURE — 99207 ZZC PRENATAL VISIT: CPT | Performed by: OBSTETRICS & GYNECOLOGY

## 2019-06-28 ASSESSMENT — MIFFLIN-ST. JEOR: SCORE: 1362.72

## 2019-06-28 ASSESSMENT — PAIN SCALES - GENERAL: PAINLEVEL: NO PAIN (0)

## 2019-06-28 NOTE — PROGRESS NOTES
Doing well.  No concerns today.  GBS Done today.  Prenatal flowsheet information is reviewed.  Reportable signs and symptoms discussed.  Return to clinic in 2 week  Denies regular contractions, vaginal bleeding, MOHSEN Godinez MD  6/28/2019

## 2019-06-28 NOTE — NURSING NOTE
"Chief Complaint   Patient presents with     Prenatal Care     35w 4d       Initial /60 (BP Location: Left arm, Cuff Size: Adult Regular)   Pulse 101   Ht 1.575 m (5' 2\")   Wt 68.9 kg (152 lb)   LMP 10/22/2018   SpO2 97%   BMI 27.80 kg/m   Estimated body mass index is 27.8 kg/m  as calculated from the following:    Height as of this encounter: 1.575 m (5' 2\").    Weight as of this encounter: 68.9 kg (152 lb).  Medication Reconciliation: complete     Eden Quiroz    "

## 2019-06-29 LAB
GP B STREP DNA SPEC QL NAA+PROBE: NEGATIVE
SPECIMEN SOURCE: NORMAL

## 2019-07-02 DIAGNOSIS — Z30.09 FAMILY PLANNING: ICD-10-CM

## 2019-07-03 NOTE — TELEPHONE ENCOUNTER
Prenatal vitamins      Last Written Prescription Date:  6/15/18  Last Fill Quantity: 100,   # refills: 3  Last Office Visit: 6/28/19  Future Office visit:    Next 5 appointments (look out 90 days)    Jul 09, 2019  2:45 PM CDT  (Arrive by 2:30 PM)  ESTABLISHED PRENATAL with Daisy Schilling NP  [facility] ([facility] ) [address]  [place] [address]  [phone]

## 2019-07-09 ENCOUNTER — PRENATAL OFFICE VISIT (OUTPATIENT)
Dept: OBGYN | Facility: OTHER | Age: 29
End: 2019-07-09
Attending: NURSE PRACTITIONER
Payer: COMMERCIAL

## 2019-07-09 VITALS
HEART RATE: 96 BPM | WEIGHT: 152.6 LBS | OXYGEN SATURATION: 97 % | HEIGHT: 62 IN | SYSTOLIC BLOOD PRESSURE: 102 MMHG | BODY MASS INDEX: 28.08 KG/M2 | DIASTOLIC BLOOD PRESSURE: 68 MMHG

## 2019-07-09 DIAGNOSIS — Z34.03 NORMAL FIRST PREGNANCY CONFIRMED, CURRENTLY IN THIRD TRIMESTER: Primary | ICD-10-CM

## 2019-07-09 PROCEDURE — 99207 ZZC PRENATAL VISIT: CPT | Performed by: NURSE PRACTITIONER

## 2019-07-09 ASSESSMENT — PAIN SCALES - GENERAL: PAINLEVEL: NO PAIN (0)

## 2019-07-09 ASSESSMENT — MIFFLIN-ST. JEOR: SCORE: 1365.44

## 2019-07-09 NOTE — NURSING NOTE
"Chief Complaint   Patient presents with     Prenatal Care     37 weeks 1 days       Initial /68 (BP Location: Right arm, Patient Position: Sitting, Cuff Size: Adult Regular)   Pulse 96   Ht 1.575 m (5' 2\")   Wt 69.2 kg (152 lb 9.6 oz)   LMP 10/22/2018   SpO2 97%   BMI 27.91 kg/m   Estimated body mass index is 27.91 kg/m  as calculated from the following:    Height as of this encounter: 1.575 m (5' 2\").    Weight as of this encounter: 69.2 kg (152 lb 9.6 oz).  Medication Reconciliation: complete     Patricia Hernández      "

## 2019-07-11 NOTE — PROGRESS NOTES
Doing well.  Increasing BH.  No LOF or regular contractions.  Signs of labor reviewed and when to call [facility].  Discussed planned postpartum follow up and breastfeeding.  Planning to pump and feed.  RTC in 1 week.

## 2019-07-16 ENCOUNTER — TELEPHONE (OUTPATIENT)
Dept: OBGYN | Facility: OTHER | Age: 29
End: 2019-07-16

## 2019-07-16 NOTE — TELEPHONE ENCOUNTER
Rash on hand or feet likely not pregnancy related nor have any adverse implications for pregnancy.  If recurs can discuss at upcoming appt or she can be seen at UC or by PCM.

## 2019-07-16 NOTE — TELEPHONE ENCOUNTER
Pt is 38 weeks pregnant and states yesterday she had a rash on her ft that was itching. Also right hand itchy. Denies swelling, no pain, No fever. Baby is moving. Pt states rash and itchness went away overnight. Please advise

## 2019-07-17 ENCOUNTER — HOSPITAL ENCOUNTER (INPATIENT)
Facility: HOSPITAL | Age: 29
LOS: 4 days | Discharge: HOME | End: 2019-07-21
Attending: OBSTETRICS & GYNECOLOGY | Admitting: OBSTETRICS & GYNECOLOGY
Payer: COMMERCIAL

## 2019-07-17 DIAGNOSIS — R11.2 NARCOTIC-INDUCED NAUSEA AND VOMITING: ICD-10-CM

## 2019-07-17 DIAGNOSIS — T40.605A NARCOTIC-INDUCED NAUSEA AND VOMITING: ICD-10-CM

## 2019-07-17 PROBLEM — Z37.9 NORMAL LABOR: Status: ACTIVE | Noted: 2019-07-17

## 2019-07-17 LAB
BASOPHILS # BLD AUTO: 0 10E9/L (ref 0–0.2)
BASOPHILS NFR BLD AUTO: 0.2 %
DIFFERENTIAL METHOD BLD: ABNORMAL
EOSINOPHIL # BLD AUTO: 0.1 10E9/L (ref 0–0.7)
EOSINOPHIL NFR BLD AUTO: 0.7 %
ERYTHROCYTE [DISTWIDTH] IN BLOOD BY AUTOMATED COUNT: 12.7 % (ref 10–15)
HCT VFR BLD AUTO: 36.4 % (ref 35–47)
HGB BLD-MCNC: 12.4 G/DL (ref 11.7–15.7)
IMM GRANULOCYTES # BLD: 0.1 10E9/L (ref 0–0.4)
IMM GRANULOCYTES NFR BLD: 1.2 %
LYMPHOCYTES # BLD AUTO: 2.5 10E9/L (ref 0.8–5.3)
LYMPHOCYTES NFR BLD AUTO: 29.3 %
MCH RBC QN AUTO: 30.5 PG (ref 26.5–33)
MCHC RBC AUTO-ENTMCNC: 34.1 G/DL (ref 31.5–36.5)
MCV RBC AUTO: 90 FL (ref 78–100)
MONOCYTES # BLD AUTO: 0.6 10E9/L (ref 0–1.3)
MONOCYTES NFR BLD AUTO: 6.9 %
NEUTROPHILS # BLD AUTO: 5.2 10E9/L (ref 1.6–8.3)
NEUTROPHILS NFR BLD AUTO: 61.7 %
NRBC # BLD AUTO: 0 10*3/UL
NRBC BLD AUTO-RTO: 0 /100
PLATELET # BLD AUTO: 134 10E9/L (ref 150–450)
RBC # BLD AUTO: 4.06 10E12/L (ref 3.8–5.2)
WBC # BLD AUTO: 8.4 10E9/L (ref 4–11)

## 2019-07-17 PROCEDURE — 36415 COLL VENOUS BLD VENIPUNCTURE: CPT | Performed by: OBSTETRICS & GYNECOLOGY

## 2019-07-17 PROCEDURE — 86901 BLOOD TYPING SEROLOGIC RH(D): CPT | Performed by: OBSTETRICS & GYNECOLOGY

## 2019-07-17 PROCEDURE — 85025 COMPLETE CBC W/AUTO DIFF WBC: CPT | Performed by: OBSTETRICS & GYNECOLOGY

## 2019-07-17 PROCEDURE — 86780 TREPONEMA PALLIDUM: CPT | Performed by: OBSTETRICS & GYNECOLOGY

## 2019-07-17 PROCEDURE — 12000000 ZZH R&B MED SURG/OB

## 2019-07-17 PROCEDURE — 86900 BLOOD TYPING SEROLOGIC ABO: CPT | Performed by: OBSTETRICS & GYNECOLOGY

## 2019-07-17 PROCEDURE — 86850 RBC ANTIBODY SCREEN: CPT | Performed by: OBSTETRICS & GYNECOLOGY

## 2019-07-17 RX ORDER — OXYCODONE AND ACETAMINOPHEN 5; 325 MG/1; MG/1
1 TABLET ORAL
Status: DISCONTINUED | OUTPATIENT
Start: 2019-07-17 | End: 2019-07-20

## 2019-07-17 RX ORDER — NALOXONE HYDROCHLORIDE 0.4 MG/ML
.1-.4 INJECTION, SOLUTION INTRAMUSCULAR; INTRAVENOUS; SUBCUTANEOUS
Status: DISCONTINUED | OUTPATIENT
Start: 2019-07-17 | End: 2019-07-20

## 2019-07-17 RX ORDER — ACETAMINOPHEN 325 MG/1
650 TABLET ORAL EVERY 4 HOURS PRN
Status: DISCONTINUED | OUTPATIENT
Start: 2019-07-17 | End: 2019-07-18

## 2019-07-17 RX ORDER — OXYTOCIN/0.9 % SODIUM CHLORIDE 30/500 ML
100-340 PLASTIC BAG, INJECTION (ML) INTRAVENOUS CONTINUOUS PRN
Status: DISCONTINUED | OUTPATIENT
Start: 2019-07-17 | End: 2019-07-20

## 2019-07-17 RX ORDER — OXYTOCIN 10 [USP'U]/ML
10 INJECTION, SOLUTION INTRAMUSCULAR; INTRAVENOUS
Status: DISCONTINUED | OUTPATIENT
Start: 2019-07-17 | End: 2019-07-20

## 2019-07-17 RX ORDER — SODIUM CHLORIDE, SODIUM LACTATE, POTASSIUM CHLORIDE, CALCIUM CHLORIDE 600; 310; 30; 20 MG/100ML; MG/100ML; MG/100ML; MG/100ML
INJECTION, SOLUTION INTRAVENOUS CONTINUOUS
Status: DISCONTINUED | OUTPATIENT
Start: 2019-07-17 | End: 2019-07-20

## 2019-07-17 RX ORDER — ONDANSETRON 2 MG/ML
4 INJECTION INTRAMUSCULAR; INTRAVENOUS EVERY 6 HOURS PRN
Status: DISCONTINUED | OUTPATIENT
Start: 2019-07-17 | End: 2019-07-18

## 2019-07-17 RX ORDER — IBUPROFEN 800 MG/1
800 TABLET, FILM COATED ORAL
Status: DISCONTINUED | OUTPATIENT
Start: 2019-07-17 | End: 2019-07-18

## 2019-07-17 RX ORDER — CARBOPROST TROMETHAMINE 250 UG/ML
250 INJECTION, SOLUTION INTRAMUSCULAR
Status: DISCONTINUED | OUTPATIENT
Start: 2019-07-17 | End: 2019-07-20

## 2019-07-17 RX ORDER — METHYLERGONOVINE MALEATE 0.2 MG/ML
200 INJECTION INTRAVENOUS
Status: DISCONTINUED | OUTPATIENT
Start: 2019-07-17 | End: 2019-07-18

## 2019-07-17 ASSESSMENT — MIFFLIN-ST. JEOR: SCORE: 1353.65

## 2019-07-18 ENCOUNTER — ANESTHESIA EVENT (OUTPATIENT)
Dept: SURGERY | Facility: HOSPITAL | Age: 29
End: 2019-07-18
Payer: COMMERCIAL

## 2019-07-18 ENCOUNTER — HOSPITAL ENCOUNTER (OUTPATIENT)
Facility: CLINIC | Age: 29
Setting detail: SPECIMEN
Discharge: HOME | End: 2019-07-18
Admitting: OBSTETRICS & GYNECOLOGY
Payer: COMMERCIAL

## 2019-07-18 ENCOUNTER — TRANSFERRED RECORDS (OUTPATIENT)
Dept: HEALTH INFORMATION MANAGEMENT | Facility: HOSPITAL | Age: 29
End: 2019-07-18

## 2019-07-18 ENCOUNTER — ANESTHESIA (OUTPATIENT)
Dept: SURGERY | Facility: HOSPITAL | Age: 29
End: 2019-07-18
Payer: COMMERCIAL

## 2019-07-18 LAB
ABO + RH BLD: NORMAL
ABO + RH BLD: NORMAL
BLD GP AB SCN SERPL QL: NORMAL
BLOOD BANK CMNT PATIENT-IMP: NORMAL
SPECIMEN EXP DATE BLD: NORMAL

## 2019-07-18 PROCEDURE — 25000128 H RX IP 250 OP 636: Performed by: OBSTETRICS & GYNECOLOGY

## 2019-07-18 PROCEDURE — 71000015 ZZH RECOVERY PHASE 1 LEVEL 2 EA ADDTL HR: Performed by: OBSTETRICS & GYNECOLOGY

## 2019-07-18 PROCEDURE — 25000128 H RX IP 250 OP 636: Performed by: NURSE ANESTHETIST, CERTIFIED REGISTERED

## 2019-07-18 PROCEDURE — 36000056 ZZH SURGERY LEVEL 3 1ST 30 MIN: Performed by: OBSTETRICS & GYNECOLOGY

## 2019-07-18 PROCEDURE — 40000275 ZZH STATISTIC RCP TIME EA 10 MIN

## 2019-07-18 PROCEDURE — 37000008 ZZH ANESTHESIA TECHNICAL FEE, 1ST 30 MIN: Performed by: OBSTETRICS & GYNECOLOGY

## 2019-07-18 PROCEDURE — 25800030 ZZH RX IP 258 OP 636: Performed by: ANESTHESIOLOGY

## 2019-07-18 PROCEDURE — 25000132 ZZH RX MED GY IP 250 OP 250 PS 637: Performed by: OBSTETRICS & GYNECOLOGY

## 2019-07-18 PROCEDURE — 59514 CESAREAN DELIVERY ONLY: CPT | Performed by: NURSE ANESTHETIST, CERTIFIED REGISTERED

## 2019-07-18 PROCEDURE — 27110028 ZZH OR GENERAL SUPPLY NON-STERILE: Performed by: OBSTETRICS & GYNECOLOGY

## 2019-07-18 PROCEDURE — 12000000 ZZH R&B MED SURG/OB

## 2019-07-18 PROCEDURE — 59510 CESAREAN DELIVERY: CPT | Performed by: OBSTETRICS & GYNECOLOGY

## 2019-07-18 PROCEDURE — 59514 CESAREAN DELIVERY ONLY: CPT | Performed by: ANESTHESIOLOGY

## 2019-07-18 PROCEDURE — 25800030 ZZH RX IP 258 OP 636: Performed by: OBSTETRICS & GYNECOLOGY

## 2019-07-18 PROCEDURE — 37000009 ZZH ANESTHESIA TECHNICAL FEE, EACH ADDTL 15 MIN: Performed by: OBSTETRICS & GYNECOLOGY

## 2019-07-18 PROCEDURE — 25000125 ZZHC RX 250: Performed by: NURSE ANESTHETIST, CERTIFIED REGISTERED

## 2019-07-18 PROCEDURE — 3E0T3BZ INTRODUCTION OF ANESTHETIC AGENT INTO PERIPHERAL NERVES AND PLEXI, PERCUTANEOUS APPROACH: ICD-10-PCS | Performed by: NURSE ANESTHETIST, CERTIFIED REGISTERED

## 2019-07-18 PROCEDURE — 25000125 ZZHC RX 250: Performed by: OBSTETRICS & GYNECOLOGY

## 2019-07-18 PROCEDURE — 40000305 ZZH STATISTIC PRE PROC ASSESS I: Performed by: OBSTETRICS & GYNECOLOGY

## 2019-07-18 PROCEDURE — 71000014 ZZH RECOVERY PHASE 1 LEVEL 2 FIRST HR: Performed by: OBSTETRICS & GYNECOLOGY

## 2019-07-18 PROCEDURE — 88307 TISSUE EXAM BY PATHOLOGIST: CPT | Mod: TC | Performed by: OBSTETRICS & GYNECOLOGY

## 2019-07-18 PROCEDURE — 25800030 ZZH RX IP 258 OP 636: Performed by: NURSE ANESTHETIST, CERTIFIED REGISTERED

## 2019-07-18 PROCEDURE — 36000058 ZZH SURGERY LEVEL 3 EA 15 ADDTL MIN: Performed by: OBSTETRICS & GYNECOLOGY

## 2019-07-18 PROCEDURE — 27210794 ZZH OR GENERAL SUPPLY STERILE: Performed by: OBSTETRICS & GYNECOLOGY

## 2019-07-18 PROCEDURE — 99140 ANES COMP EMERGENCY COND: CPT | Performed by: NURSE ANESTHETIST, CERTIFIED REGISTERED

## 2019-07-18 PROCEDURE — 64488 TAP BLOCK BI INJECTION: CPT | Mod: 59 | Performed by: NURSE ANESTHETIST, CERTIFIED REGISTERED

## 2019-07-18 PROCEDURE — 88307 TISSUE EXAM BY PATHOLOGIST: CPT | Mod: 26 | Performed by: OBSTETRICS & GYNECOLOGY

## 2019-07-18 RX ORDER — METHYLERGONOVINE MALEATE 0.2 MG/ML
INJECTION INTRAVENOUS PRN
Status: DISCONTINUED | OUTPATIENT
Start: 2019-07-18 | End: 2019-07-18

## 2019-07-18 RX ORDER — LIDOCAINE 40 MG/G
CREAM TOPICAL
Status: DISCONTINUED | OUTPATIENT
Start: 2019-07-18 | End: 2019-07-18 | Stop reason: HOSPADM

## 2019-07-18 RX ORDER — PROCHLORPERAZINE 25 MG
25 SUPPOSITORY, RECTAL RECTAL EVERY 12 HOURS PRN
Status: DISCONTINUED | OUTPATIENT
Start: 2019-07-18 | End: 2019-07-21 | Stop reason: HOSPADM

## 2019-07-18 RX ORDER — DEXAMETHASONE SODIUM PHOSPHATE 10 MG/ML
INJECTION, SOLUTION INTRAMUSCULAR; INTRAVENOUS PRN
Status: DISCONTINUED | OUTPATIENT
Start: 2019-07-18 | End: 2019-07-18

## 2019-07-18 RX ORDER — HYDRALAZINE HYDROCHLORIDE 20 MG/ML
2.5-5 INJECTION INTRAMUSCULAR; INTRAVENOUS EVERY 10 MIN PRN
Status: DISCONTINUED | OUTPATIENT
Start: 2019-07-18 | End: 2019-07-18 | Stop reason: HOSPADM

## 2019-07-18 RX ORDER — OXYCODONE HYDROCHLORIDE 5 MG/1
5-10 TABLET ORAL
Status: DISCONTINUED | OUTPATIENT
Start: 2019-07-18 | End: 2019-07-20

## 2019-07-18 RX ORDER — OXYTOCIN 10 [USP'U]/ML
INJECTION, SOLUTION INTRAMUSCULAR; INTRAVENOUS PRN
Status: DISCONTINUED | OUTPATIENT
Start: 2019-07-18 | End: 2019-07-18

## 2019-07-18 RX ORDER — KETOROLAC TROMETHAMINE 30 MG/ML
30 INJECTION, SOLUTION INTRAMUSCULAR; INTRAVENOUS
Status: DISCONTINUED | OUTPATIENT
Start: 2019-07-18 | End: 2019-07-18

## 2019-07-18 RX ORDER — NALOXONE HYDROCHLORIDE 0.4 MG/ML
.1-.4 INJECTION, SOLUTION INTRAMUSCULAR; INTRAVENOUS; SUBCUTANEOUS
Status: DISCONTINUED | OUTPATIENT
Start: 2019-07-18 | End: 2019-07-20

## 2019-07-18 RX ORDER — KETOROLAC TROMETHAMINE 30 MG/ML
INJECTION, SOLUTION INTRAMUSCULAR; INTRAVENOUS PRN
Status: DISCONTINUED | OUTPATIENT
Start: 2019-07-18 | End: 2019-07-18

## 2019-07-18 RX ORDER — BACITRACIN ZINC 500 [USP'U]/G
OINTMENT TOPICAL
Status: DISCONTINUED
Start: 2019-07-18 | End: 2019-07-18 | Stop reason: HOSPADM

## 2019-07-18 RX ORDER — ONDANSETRON 4 MG/1
4 TABLET, ORALLY DISINTEGRATING ORAL EVERY 30 MIN PRN
Status: DISCONTINUED | OUTPATIENT
Start: 2019-07-18 | End: 2019-07-18 | Stop reason: HOSPADM

## 2019-07-18 RX ORDER — METHYLERGONOVINE MALEATE 0.2 MG/ML
200 INJECTION INTRAVENOUS
Status: DISCONTINUED | OUTPATIENT
Start: 2019-07-18 | End: 2019-07-21 | Stop reason: HOSPADM

## 2019-07-18 RX ORDER — BISACODYL 10 MG
10 SUPPOSITORY, RECTAL RECTAL DAILY PRN
Status: DISCONTINUED | OUTPATIENT
Start: 2019-07-20 | End: 2019-07-21 | Stop reason: HOSPADM

## 2019-07-18 RX ORDER — ACETAMINOPHEN 325 MG/1
975 TABLET ORAL ONCE
Status: DISCONTINUED | OUTPATIENT
Start: 2019-07-18 | End: 2019-07-18 | Stop reason: HOSPADM

## 2019-07-18 RX ORDER — CITRIC ACID/SODIUM CITRATE 334-500MG
30 SOLUTION, ORAL ORAL
Status: COMPLETED | OUTPATIENT
Start: 2019-07-18 | End: 2019-07-18

## 2019-07-18 RX ORDER — IBUPROFEN 800 MG/1
800 TABLET, FILM COATED ORAL EVERY 6 HOURS PRN
Status: DISCONTINUED | OUTPATIENT
Start: 2019-07-19 | End: 2019-07-21 | Stop reason: HOSPADM

## 2019-07-18 RX ORDER — CEFAZOLIN SODIUM 1 G/50ML
1 INJECTION, SOLUTION INTRAVENOUS SEE ADMIN INSTRUCTIONS
Status: DISCONTINUED | OUTPATIENT
Start: 2019-07-18 | End: 2019-07-18 | Stop reason: HOSPADM

## 2019-07-18 RX ORDER — KETOROLAC TROMETHAMINE 30 MG/ML
30 INJECTION, SOLUTION INTRAMUSCULAR; INTRAVENOUS EVERY 6 HOURS
Status: COMPLETED | OUTPATIENT
Start: 2019-07-18 | End: 2019-07-19

## 2019-07-18 RX ORDER — BUPIVACAINE HYDROCHLORIDE 7.5 MG/ML
INJECTION, SOLUTION INTRASPINAL PRN
Status: DISCONTINUED | OUTPATIENT
Start: 2019-07-18 | End: 2019-07-18

## 2019-07-18 RX ORDER — ONDANSETRON 2 MG/ML
INJECTION INTRAMUSCULAR; INTRAVENOUS PRN
Status: DISCONTINUED | OUTPATIENT
Start: 2019-07-18 | End: 2019-07-18

## 2019-07-18 RX ORDER — ACETAMINOPHEN 325 MG/1
650 TABLET ORAL EVERY 4 HOURS PRN
Status: DISCONTINUED | OUTPATIENT
Start: 2019-07-21 | End: 2019-07-21 | Stop reason: HOSPADM

## 2019-07-18 RX ORDER — SODIUM CHLORIDE, SODIUM LACTATE, POTASSIUM CHLORIDE, CALCIUM CHLORIDE 600; 310; 30; 20 MG/100ML; MG/100ML; MG/100ML; MG/100ML
INJECTION, SOLUTION INTRAVENOUS CONTINUOUS
Status: DISCONTINUED | OUTPATIENT
Start: 2019-07-18 | End: 2019-07-18 | Stop reason: HOSPADM

## 2019-07-18 RX ORDER — ACETAMINOPHEN 325 MG/1
975 TABLET ORAL EVERY 8 HOURS
Status: COMPLETED | OUTPATIENT
Start: 2019-07-18 | End: 2019-07-21

## 2019-07-18 RX ORDER — SIMETHICONE 80 MG
80 TABLET,CHEWABLE ORAL 4 TIMES DAILY PRN
Status: DISCONTINUED | OUTPATIENT
Start: 2019-07-18 | End: 2019-07-21 | Stop reason: HOSPADM

## 2019-07-18 RX ORDER — DIPHENHYDRAMINE HCL 25 MG
25 CAPSULE ORAL EVERY 6 HOURS PRN
Status: DISCONTINUED | OUTPATIENT
Start: 2019-07-18 | End: 2019-07-21 | Stop reason: HOSPADM

## 2019-07-18 RX ORDER — OXYTOCIN/0.9 % SODIUM CHLORIDE 30/500 ML
100 PLASTIC BAG, INJECTION (ML) INTRAVENOUS CONTINUOUS
Status: DISCONTINUED | OUTPATIENT
Start: 2019-07-18 | End: 2019-07-20 | Stop reason: CLARIF

## 2019-07-18 RX ORDER — VITAMIN A ACETATE, .BETA.-CAROTENE, ASCORBIC ACID, CHOLECALCIFEROL, .ALPHA.-TOCOPHEROL ACETATE, DL-, THIAMINE MONONITRATE, RIBOFLAVIN, NIACINAMIDE, PYRIDOXINE HYDROCHLORIDE, FOLIC ACID, CYANOCOBALAMIN, CALCIUM CARBONATE, FERROUS FUMARATE, ZINC OXIDE, AND CUPRIC OXIDE 2000; 2000; 120; 400; 22; 1.84; 3; 20; 10; 1; 12; 200; 27; 25; 2 [IU]/1; [IU]/1; MG/1; [IU]/1; MG/1; MG/1; MG/1; MG/1; MG/1; MG/1; UG/1; MG/1; MG/1; MG/1; MG/1
1 TABLET ORAL DAILY
Status: DISCONTINUED | OUTPATIENT
Start: 2019-07-18 | End: 2019-07-21 | Stop reason: HOSPADM

## 2019-07-18 RX ORDER — LIDOCAINE 40 MG/G
CREAM TOPICAL
Status: DISCONTINUED | OUTPATIENT
Start: 2019-07-18 | End: 2019-07-21 | Stop reason: HOSPADM

## 2019-07-18 RX ORDER — CEFAZOLIN SODIUM 2 G/100ML
2 INJECTION, SOLUTION INTRAVENOUS
Status: COMPLETED | OUTPATIENT
Start: 2019-07-18 | End: 2019-07-18

## 2019-07-18 RX ORDER — MISOPROSTOL 200 UG/1
400 TABLET ORAL
Status: DISCONTINUED | OUTPATIENT
Start: 2019-07-18 | End: 2019-07-21 | Stop reason: HOSPADM

## 2019-07-18 RX ORDER — DEXTROSE, SODIUM CHLORIDE, SODIUM LACTATE, POTASSIUM CHLORIDE, AND CALCIUM CHLORIDE 5; .6; .31; .03; .02 G/100ML; G/100ML; G/100ML; G/100ML; G/100ML
INJECTION, SOLUTION INTRAVENOUS CONTINUOUS
Status: DISCONTINUED | OUTPATIENT
Start: 2019-07-18 | End: 2019-07-20 | Stop reason: CLARIF

## 2019-07-18 RX ORDER — OXYTOCIN 10 [USP'U]/ML
10 INJECTION, SOLUTION INTRAMUSCULAR; INTRAVENOUS
Status: DISCONTINUED | OUTPATIENT
Start: 2019-07-18 | End: 2019-07-21 | Stop reason: HOSPADM

## 2019-07-18 RX ORDER — BACITRACIN ZINC 500 [USP'U]/G
OINTMENT TOPICAL PRN
Status: DISCONTINUED | OUTPATIENT
Start: 2019-07-18 | End: 2019-07-18 | Stop reason: HOSPADM

## 2019-07-18 RX ORDER — ONDANSETRON 2 MG/ML
4 INJECTION INTRAMUSCULAR; INTRAVENOUS EVERY 6 HOURS PRN
Status: DISCONTINUED | OUTPATIENT
Start: 2019-07-18 | End: 2019-07-21 | Stop reason: HOSPADM

## 2019-07-18 RX ORDER — ALBUTEROL SULFATE 0.83 MG/ML
2.5 SOLUTION RESPIRATORY (INHALATION) EVERY 4 HOURS PRN
Status: DISCONTINUED | OUTPATIENT
Start: 2019-07-18 | End: 2019-07-18 | Stop reason: HOSPADM

## 2019-07-18 RX ORDER — LABETALOL 20 MG/4 ML (5 MG/ML) INTRAVENOUS SYRINGE
10
Status: DISCONTINUED | OUTPATIENT
Start: 2019-07-18 | End: 2019-07-18 | Stop reason: HOSPADM

## 2019-07-18 RX ORDER — OXYTOCIN/0.9 % SODIUM CHLORIDE 30/500 ML
340 PLASTIC BAG, INJECTION (ML) INTRAVENOUS CONTINUOUS PRN
Status: DISCONTINUED | OUTPATIENT
Start: 2019-07-18 | End: 2019-07-21 | Stop reason: HOSPADM

## 2019-07-18 RX ORDER — ROPIVACAINE HYDROCHLORIDE 2 MG/ML
INJECTION, SOLUTION EPIDURAL; INFILTRATION; PERINEURAL PRN
Status: DISCONTINUED | OUTPATIENT
Start: 2019-07-18 | End: 2019-07-18

## 2019-07-18 RX ORDER — LANOLIN 100 %
OINTMENT (GRAM) TOPICAL
Status: DISCONTINUED | OUTPATIENT
Start: 2019-07-18 | End: 2019-07-21 | Stop reason: HOSPADM

## 2019-07-18 RX ORDER — ONDANSETRON 2 MG/ML
4 INJECTION INTRAMUSCULAR; INTRAVENOUS EVERY 30 MIN PRN
Status: DISCONTINUED | OUTPATIENT
Start: 2019-07-18 | End: 2019-07-18 | Stop reason: HOSPADM

## 2019-07-18 RX ORDER — HYDROCORTISONE 2.5 %
CREAM (GRAM) TOPICAL 3 TIMES DAILY PRN
Status: DISCONTINUED | OUTPATIENT
Start: 2019-07-18 | End: 2019-07-21 | Stop reason: HOSPADM

## 2019-07-18 RX ORDER — HYDROMORPHONE HYDROCHLORIDE 1 MG/ML
.3-.5 INJECTION, SOLUTION INTRAMUSCULAR; INTRAVENOUS; SUBCUTANEOUS EVERY 5 MIN PRN
Status: DISCONTINUED | OUTPATIENT
Start: 2019-07-18 | End: 2019-07-18 | Stop reason: HOSPADM

## 2019-07-18 RX ORDER — DOCUSATE SODIUM 100 MG/1
100 CAPSULE, LIQUID FILLED ORAL 2 TIMES DAILY
Status: DISCONTINUED | OUTPATIENT
Start: 2019-07-18 | End: 2019-07-21 | Stop reason: HOSPADM

## 2019-07-18 RX ORDER — MEPERIDINE HYDROCHLORIDE 50 MG/ML
12.5 INJECTION INTRAMUSCULAR; INTRAVENOUS; SUBCUTANEOUS EVERY 5 MIN PRN
Status: DISCONTINUED | OUTPATIENT
Start: 2019-07-18 | End: 2019-07-18 | Stop reason: HOSPADM

## 2019-07-18 RX ORDER — METOCLOPRAMIDE HYDROCHLORIDE 5 MG/ML
10 INJECTION INTRAMUSCULAR; INTRAVENOUS EVERY 6 HOURS PRN
Status: DISCONTINUED | OUTPATIENT
Start: 2019-07-18 | End: 2019-07-21 | Stop reason: HOSPADM

## 2019-07-18 RX ORDER — DEXAMETHASONE SODIUM PHOSPHATE 4 MG/ML
4 INJECTION, SOLUTION INTRA-ARTICULAR; INTRALESIONAL; INTRAMUSCULAR; INTRAVENOUS; SOFT TISSUE
Status: DISCONTINUED | OUTPATIENT
Start: 2019-07-18 | End: 2019-07-18 | Stop reason: HOSPADM

## 2019-07-18 RX ORDER — HYDROXYZINE HYDROCHLORIDE 50 MG/ML
100 INJECTION, SOLUTION INTRAMUSCULAR EVERY 6 HOURS PRN
Status: DISCONTINUED | OUTPATIENT
Start: 2019-07-18 | End: 2019-07-20

## 2019-07-18 RX ORDER — FENTANYL CITRATE 50 UG/ML
25-50 INJECTION, SOLUTION INTRAMUSCULAR; INTRAVENOUS
Status: DISCONTINUED | OUTPATIENT
Start: 2019-07-18 | End: 2019-07-18 | Stop reason: HOSPADM

## 2019-07-18 RX ORDER — DIPHENHYDRAMINE HYDROCHLORIDE 50 MG/ML
25 INJECTION INTRAMUSCULAR; INTRAVENOUS EVERY 6 HOURS PRN
Status: DISCONTINUED | OUTPATIENT
Start: 2019-07-18 | End: 2019-07-21 | Stop reason: HOSPADM

## 2019-07-18 RX ADMIN — HYDROMORPHONE HYDROCHLORIDE 0.1 MG: 1 INJECTION, SOLUTION INTRAMUSCULAR; INTRAVENOUS; SUBCUTANEOUS at 13:57

## 2019-07-18 RX ADMIN — DEXAMETHASONE SODIUM PHOSPHATE 10 MG: 10 INJECTION, SOLUTION INTRAMUSCULAR; INTRAVENOUS at 14:19

## 2019-07-18 RX ADMIN — BUPIVACAINE HYDROCHLORIDE IN DEXTROSE 1.6 ML: 7.5 INJECTION, SOLUTION SUBARACHNOID at 13:57

## 2019-07-18 RX ADMIN — SODIUM CHLORIDE, SODIUM LACTATE, POTASSIUM CHLORIDE, CALCIUM CHLORIDE AND DEXTROSE MONOHYDRATE: 5; 600; 310; 30; 20 INJECTION, SOLUTION INTRAVENOUS at 17:58

## 2019-07-18 RX ADMIN — TRANEXAMIC ACID 1 G: 100 INJECTION, SOLUTION INTRAVENOUS at 14:00

## 2019-07-18 RX ADMIN — DEXAMETHASONE SODIUM PHOSPHATE 5 MG: 10 INJECTION, SOLUTION INTRAMUSCULAR; INTRAVENOUS at 14:46

## 2019-07-18 RX ADMIN — AZITHROMYCIN 500 MG: 500 INJECTION, POWDER, LYOPHILIZED, FOR SOLUTION INTRAVENOUS at 13:39

## 2019-07-18 RX ADMIN — DOCUSATE SODIUM 100 MG: 100 CAPSULE, LIQUID FILLED ORAL at 20:44

## 2019-07-18 RX ADMIN — OXYTOCIN 30 UNITS: 10 INJECTION, SOLUTION INTRAMUSCULAR; INTRAVENOUS at 14:49

## 2019-07-18 RX ADMIN — KETOROLAC TROMETHAMINE 30 MG: 30 INJECTION, SOLUTION INTRAMUSCULAR at 14:31

## 2019-07-18 RX ADMIN — METHYLERGONOVINE MALEATE 200 MCG: 0.2 INJECTION INTRAMUSCULAR; INTRAVENOUS at 14:26

## 2019-07-18 RX ADMIN — SODIUM CHLORIDE, POTASSIUM CHLORIDE, SODIUM LACTATE AND CALCIUM CHLORIDE: 600; 310; 30; 20 INJECTION, SOLUTION INTRAVENOUS at 13:10

## 2019-07-18 RX ADMIN — OXYTOCIN 30 UNITS: 10 INJECTION, SOLUTION INTRAMUSCULAR; INTRAVENOUS at 14:17

## 2019-07-18 RX ADMIN — DEXAMETHASONE SODIUM PHOSPHATE 5 MG: 10 INJECTION, SOLUTION INTRAMUSCULAR; INTRAVENOUS at 14:48

## 2019-07-18 RX ADMIN — CEFAZOLIN SODIUM 2 G: 2 INJECTION, SOLUTION INTRAVENOUS at 13:45

## 2019-07-18 RX ADMIN — PHENYLEPHRINE HYDROCHLORIDE 50 MCG: 10 INJECTION INTRAVENOUS at 14:39

## 2019-07-18 RX ADMIN — SODIUM CITRATE AND CITRIC ACID MONOHYDRATE 30 ML: 500; 334 SOLUTION ORAL at 13:38

## 2019-07-18 RX ADMIN — PHENYLEPHRINE HYDROCHLORIDE 150 MCG: 10 INJECTION INTRAVENOUS at 14:14

## 2019-07-18 RX ADMIN — ONDANSETRON 4 MG: 2 INJECTION INTRAMUSCULAR; INTRAVENOUS at 13:54

## 2019-07-18 RX ADMIN — ROPIVACAINE HYDROCHLORIDE 20 ML: 2 INJECTION, SOLUTION EPIDURAL; INFILTRATION at 14:48

## 2019-07-18 RX ADMIN — ACETAMINOPHEN 975 MG: 325 TABLET, FILM COATED ORAL at 17:59

## 2019-07-18 RX ADMIN — ONDANSETRON 4 MG: 2 INJECTION INTRAMUSCULAR; INTRAVENOUS at 14:19

## 2019-07-18 RX ADMIN — SODIUM CHLORIDE, POTASSIUM CHLORIDE, SODIUM LACTATE AND CALCIUM CHLORIDE: 600; 310; 30; 20 INJECTION, SOLUTION INTRAVENOUS at 16:40

## 2019-07-18 RX ADMIN — SODIUM CHLORIDE, POTASSIUM CHLORIDE, SODIUM LACTATE AND CALCIUM CHLORIDE 1000 ML: 600; 310; 30; 20 INJECTION, SOLUTION INTRAVENOUS at 13:42

## 2019-07-18 RX ADMIN — KETOROLAC TROMETHAMINE 30 MG: 30 INJECTION, SOLUTION INTRAMUSCULAR at 20:31

## 2019-07-18 RX ADMIN — ROPIVACAINE HYDROCHLORIDE 20 ML: 2 INJECTION, SOLUTION EPIDURAL; INFILTRATION at 14:46

## 2019-07-18 NOTE — OR NURSING
Pateint discharged to OB floor.  Mehnaz score 9. Pain level 0/10.  Discharged from unit via cart.  Hand off report given to Jennifer ascencio

## 2019-07-18 NOTE — PLAN OF CARE
"Cervical check completed, bright red blood noted on glove, when pt was sat up in bed she felt a \"gush\" and a moderate amount of bright red blood was noted at vaginal site. Dr. Godinez notified and on the way in to evaluate patient   "

## 2019-07-18 NOTE — OP NOTE
Section Operative Note  [facility]  Pre-operative diagnosis: Intrauterine pregnancy at 38 3/7 weeks gestation  Suspected placental abruption   Post-operative diagnosis: Same, Pathology pending   Procedure: Primary low transverse  section   Surgeon: Ankush Godinez MD   Assistant(s): None   Anesthesia: Spinal anesthesia and Tap block   Estimated blood loss: 500ml   Total IV fluids: (See anesthesia record)   Blood transfusion: No transfusion was given during surgery   Total urine output: (See anesthesia record)   Drains: Jama catheter   Specimens: placenta    Findings: Vigorous  male ([place]).  Apgar's 9/9, intact placenta with 3VC, nml pelvic anatomy   Complications: None   Condition: Mother stable, transfered to post-anesthesia recovery     Procedure Details:  The risks, benefits, complications, treatment options, and expected outcomes were discussed with the patient.  The patient concurred with the proposed plan, giving informed consent.  The patient was taken to Operating Room,  identity confirmed and the procedure verified as  Delivery. A Time Out was held and the above information confirmed.    After uneventful anesthesia placement the patient was prepped and draped in the left lateral tilt  position and a jama catheter placed.   A low-transverse skin incision was made with a scalpel and carried down through the subcutaneous tissue to the fascia which was extended transversely. The fascia was  from the underlying rectus tissue superiorly and inferiorly. The peritoneum was identified and entered without difficulty. The utero-vesical peritoneal reflection was incised transversely and the bladder flap was bluntly freed from the lower uterine segment. A low transverse uterine incision was made.  The infant was delivered from the vtx presentation.  After the umbilical cord was clamped and cut the infant was suctioned and handed to the awaiting resuscitation  team.  Cord blood was obtained for evaluation. The placenta was removed intact and the uterus swept free of membranes and debris. The uterine incision was closed with running locked sutures of 1.0 Chromic in a 2 layer fashion.  Hemostasis was excellent. Irrigation with warm normal saline was carried out until clear.The rectus muscles were re approximated with running 0 Vicryl.   The fascia was then reapproximated with running sutures of 0 PDS. The subcutaneous space was irrigated and checked for hemostasis.  The skin was reapproximated with surgical staples.  Dressings were applied.   A vaginal exam was performed at the end of the procedure to evacuate the lower uterine segment and vagina of clots.  There were no complications and the patient was transferred to the recovery room in excellent and stable condition.    Instrument, sponge, and needle counts were correct prior the abdominal closure and at the conclusion of the case.         Ankush Godinez MD

## 2019-07-18 NOTE — ANESTHESIA PREPROCEDURE EVALUATION
Anesthesia Pre-Procedure Evaluation    Patient: Tiffanie Alejo   MRN: [id number] : 1990          Preoperative Diagnosis: * No pre-op diagnosis entered *    Procedure(s):   SECTION    Past Medical History:   Diagnosis Date     Gluten intolerance      Lactose Intolerance 2013     Ovarian cyst 2013     Spondylolisthesis at L5-S1 level     grade 1     Urinary incontinence     urge     Past Surgical History:   Procedure Laterality Date     ADENOIDECTOMY       COLONOSCOPY  2014     ESOPHAGOGASTRODUODENOSCOPY  2014     GI SURGERY  3/7/13    colonoscopy     LAPAROSCOPY DIAGNOSTIC (GENERAL)       TONSILLECTOMY       Brooks Teeth Extraction         Anesthesia Evaluation     . Pt has had prior anesthetic.     History of anesthetic complications   - PONV        ROS/MED HX    ENT/Pulmonary:  - neg pulmonary ROS     Neurologic:  - neg neurologic ROS     Cardiovascular:  - neg cardiovascular ROS       METS/Exercise Tolerance:     Hematologic:  - neg hematologic  ROS       Musculoskeletal:   (+)  other musculoskeletal- L5-S1 level Spondylolisthesis       GI/Hepatic:     (+) GERD Other GI/Hepatic Gastroenteritis      Renal/Genitourinary:     (+) Other Renal/ Genitourinary, urge incontinence      Endo:  - neg endo ROS       Psychiatric:  - neg psychiatric ROS       Infectious Disease:  - neg infectious disease ROS       Malignancy:      - no malignancy   Other:    (+) Possibly pregnant   - neg other ROS                      Physical Exam  Normal systems: cardiovascular, pulmonary and dental    Airway   Mallampati: I  TM distance: >3 FB  Neck ROM: full    Dental     Cardiovascular   Rhythm and rate: regular and normal      Pulmonary    breath sounds clear to auscultation            Lab Results   Component Value Date    WBC 8.4 2019    HGB 12.4 2019    HCT 36.4 2019     (L) 2019     2018    POTASSIUM 3.7 2018    CHLORIDE 104 2018     "CO2 25 12/28/2018    BUN 6 (L) 12/28/2018    CR 0.59 12/28/2018    GLC 75 03/19/2019    SHANNAN 8.7 12/28/2018    ALBUMIN 3.3 (L) 05/27/2014    PROTTOTAL 6.5 05/27/2014    ALT 20 05/27/2014    AST 16 05/27/2014    ALKPHOS 64 05/27/2014    BILITOTAL 0.3 05/27/2014    HCG Negative 05/27/2014       Preop Vitals  BP Readings from Last 3 Encounters:   07/18/19 111/66   07/09/19 102/68   06/28/19 105/60    Pulse Readings from Last 3 Encounters:   07/18/19 86   07/09/19 96   06/28/19 101      Resp Readings from Last 3 Encounters:   07/18/19 16   12/28/18 17   07/17/17 17    SpO2 Readings from Last 3 Encounters:   07/18/19 96%   07/09/19 97%   06/28/19 97%      Temp Readings from Last 1 Encounters:   07/18/19 98.3  F (36.8  C) (Oral)    Ht Readings from Last 1 Encounters:   07/17/19 1.575 m (5' 2\")      Wt Readings from Last 1 Encounters:   07/17/19 68 kg (150 lb)    Estimated body mass index is 27.44 kg/m  as calculated from the following:    Height as of this encounter: 1.575 m (5' 2\").    Weight as of this encounter: 68 kg (150 lb).       Anesthesia Plan      History & Physical Review  History and physical reviewed and following examination; no interval change.    ASA Status:  2 emergent.    NPO Status:  > 8 hours    Plan for Spinal and Periph. Nerve Block for postop pain   PONV prophylaxis:  Ondansetron (or other 5HT-3) and Dexamethasone or Solumedrol       Postoperative Care  Postoperative pain management:  IV analgesics, Oral pain medications, Neuraxial analgesia and Peripheral nerve block (Single Shot).  Plan for postoperative opioid use.    Consents  Anesthetic plan, risks, benefits and alternatives discussed with:  Patient.  Use of blood products discussed: Yes.   Use of blood products discussed with Patient.  Consented to blood products.  .                 Scout Cabrera MD  "

## 2019-07-18 NOTE — PROGRESS NOTES
"(S) Pt c/o some upper abdominal/fundal uterine pain as she had on admit.  Continued bleeding when she stands/voids/cervical checks.   (O)  Vitals:    19 2216 19 0533 19 0725 19 1211   BP: 116/75 133/83 97/67 111/66   BP Location: Left arm      Pulse: 83 86     Resp: 16 16  16   Temp: 97.7  F (36.5  C) 98.7  F (37.1  C) 98.4  F (36.9  C) 98.3  F (36.8  C)   TempSrc: Oral Oral Oral Oral   SpO2: 95% 98% 96% 96%   Weight: 68 kg (150 lb)      Height: 1.575 m (5' 2\")        Cervix: Unchanged. Moderate dark blood on glove with cervical check.     Fetal Heart Rate Tracing: reactive and reassuring  Cat:1    On earlier FHR deceleration to 90's that resolved with position change.      Tocometer: external monitor and frequency q 4 minutes    (A/P)  Suspected small placental abruption.  Discussed options of expectant, augmentation and  delivery.  R/B of each discussed.  Pt desiring to proceed with  delivery.    Reveiwed goals, risks, alternatives of a  section including bleeding, transfusion,  infection, and potential damage to other organs including bowel, bladder, baby, blood vessels and nerves.  Potential need for  in future.  Hosptial stay and recovery period discussed.  All questions were answered.  Consent form was reviewed and signed.  Ankush Godinez MD        "

## 2019-07-18 NOTE — ANESTHESIA PROCEDURE NOTES
Peripheral nerve/Neuraxial procedure note : TAP  Pre-Procedure  Performed by   Referred by TORRES  Location: OR    Procedure Times:7/18/2019 2:40 PM and 7/18/2019 2:45 PM  Pre-Anesthestic Checklist: patient identified, IV checked, risks and benefits discussed, informed consent, monitors and equipment checked, pre-op evaluation, at physician/surgeon's request and post-op pain management    Timeout  Correct Patient: Yes   Correct Procedure: Yes   Correct Site: Yes   Correct Laterality: N/A   Correct Position: Yes   Site Marked: N/A   .   Procedure Documentation    .    Procedure:  bilateral  TAP.     Ultrasound used to identify targeted nerve, plexus, or vascular marker and placed a needle adjacent to it., Ultrasound was used to visualize the spread of the anesthetic in close proximity to the above stated nerve. A permanent image is entered into the patient's record.  Patient Prep;chlorhexidine gluconate and isopropyl alcohol.  .  Needle: Touhy needle Needle Gauge: 22.    Needle Length (Inches) 2  Insertion Method: Single Shot.       Assessment/Narrative  Paresthesias: No.  Injection made incrementally with aspirations every 5 mL..  The placement was negative for: blood aspirated, painful injection and site bleeding.  Bolus given via..   Secured via.   Complications: none.

## 2019-07-18 NOTE — PLAN OF CARE
Pt up to restroom. States she had several small clots into the toilet bowl. Pt flushed and was unwitnessed by nurse. Pt states having blood on the toilet paper. Pt reports being uncomfortable and wanting to be checked as she is requesting for an Epidural. Pt checked and is 1 cm, posterior and 0 % effaced. Pt was given a birthing ball, offered to get into the tub, or walk the unit. Pt was happy with this plan.    0 = independent

## 2019-07-18 NOTE — PLAN OF CARE
Assessments as charted. B/P: 102/77, T: 97.6, P: 86, R: 20. Rates pain: 0/10 Patient denies pain. Incision: no drainage. Voiding without difficulty. Fundus Firm U/2. Lochia: Light. Activity: unrestricted with out pain. Infant feeding: Breast feeding going well. Patient wanting to pump and bottle feed.        Postpartum breastfeeding assessment completed and education provided, see Patient Education Activity.  Items included in the education are:   proper positioning and latch  effectiveness of feeding  manual expression  handling and storing breastmilk  maintenance of breastfeeding for the first 6 months  sign/symptoms of infant feeding issues requiring referral to qualified health care provider  Postpartum care education provided, see Patient Education activity. Patient denies needs. Will monitor.  Marlin Acuña

## 2019-07-18 NOTE — PLAN OF CARE
Dr. Godinez  has been here to see patient and  has determined that Tiffanie Alejo should be readied for unscheduled  section. Plan of care reviewed with patient and support person. Questions answered and concerns addressed by MD. Patient agrees with plan of care. Consent signed. Anesthesia notified. Celso cloth applied to abdomen. Ready for surgery. To OR per cart. T's 120    Patient transferred to bed via self.. Patient is alert and oriented X 3, denies any pain. pain.   Patient oriented to room, unit, hourly rounding, and plan of care. Explained admission packet with patient bill of rights brochure. Will continue to monitor and document as needed.     Inpatient nursing criteria listed below was met:    Health care directives status obtained and documented: Yes  Patient identifies a surrogate decision maker: yes  If yes, who:spouse Contact Information:See Facesheet  Core Measure diagnosis present:: No  Vaccine assessment done and vaccines ordered if appropriate. Yes  Clergy visit ordered if patient requests: N/A  Skin issues/needs documented:Yes  Isolation needs addressed, if appropriate: N/A  [facility] Fall Risk Assessment completed:  Yes  Fall Prevention (Med and High risk): Care plan updated, Education given and documented and signage used: N/A  Care Plan initiated: Yes  Education Documented (Reminder to educate patient if MRSA is present on admission): Yes  Education Assessment documented:Yes  Patient has discharge needs (If yes, please explain): No

## 2019-07-18 NOTE — PROGRESS NOTES
"(S)  Stable overnight.  Bleeding still present overnight but scant.  No gushing/clots.  Upper abdominal pain resolved.  Continued contractions overnight without cervical change. Fetus still active with reassuring assessment on NST.  Pt states contractions getting stronger this AM.   (O)  Vitals:    07/17/19 2216 07/18/19 0533 07/18/19 0725   BP: 116/75 133/83 97/67   BP Location: Left arm     Pulse: 83 86    Resp: 16 16    Temp: 97.7  F (36.5  C) 98.7  F (37.1  C) 98.4  F (36.9  C)   TempSrc: Oral Oral Oral   SpO2: 95% 98% 96%   Weight: 68 kg (150 lb)     Height: 1.575 m (5' 2\")       Cervix:   Membranes: intact   Dilation: 1   Effacement: 30%   Station:-3    Fetal Heart Rate Tracing: reactive and reassuring, Tier 1 (normal)  Cat:1          Tocometer: external monitor and frequency q 1-4 minutes    (A/P)  Third trimester bleeding, latent labor.  CPM.  If no labor progress and otherwise  stable consider augmentation.  If increased bleeding or clinical concerns for abruption discussed CD.  Pt agrees with POC.    "

## 2019-07-18 NOTE — PLAN OF CARE
"Labor Shift Note  Data: See Flowsheets activity for contraction and fetal documentation.   Vitals:    19 2216 19 0533 19 0725   BP: 116/75 133/83 97/67   BP Location: Left arm     Pulse: 83 86    Resp: 16 16    Temp: 97.7  F (36.5  C) 98.7  F (37.1  C) 98.4  F (36.9  C)   TempSrc: Oral Oral Oral   SpO2: 95% 98% 96%   Weight: 68 kg (150 lb)     Height: 1.575 m (5' 2\")     . Signs and symptoms of infection Absent.    Complications in labor: slight bright red bleeding upon MD cervical check. There is no bleeding when patient goes to the bathroom no gushing or clots. Support person significant other present.  Interventions: Continue uterine/fetal assessment per orders and nursing discretion. Vital Signs per order set. Comfort measures/pain control/labor management: Continuous monitoring   Plan: Anticipate . Provide labor/coping assistance as needed by patient and support person.  Observe for and notify care provider of indications of progressing labor, need for pain medications, or signs of fetal/maternal compromise.   "

## 2019-07-18 NOTE — PLAN OF CARE
Labor Admission  Tiffanie Alejo  MRN: [id number]  Gestational Age: 38w2d      Tiffanie Alejo is admitted for active labor.  States ihsan since .  Rates pain at 5/10.  bright red  began at 0.  Denies LOF.     Dr. Godinez notified of arrival and condition and Intrapartum orders initiated.      FHT: 120  NST: Reactive .  Uterine Assessment: frequency q 2-7 minutes, Contractions: mild strength and pt not feeling all of the contractions.      Patient is alert and oriented X 3,Patient oriented to room, unit, hourly rounding, and plan of care.  Call light within reach. Explained admission packet with patient bill of rights brochure. Will continue to monitor and document as needed.     Inpatient nursing criteria listed below was met:    Health care directives status obtained and documented: Yes  Patient identifies a surrogate decision maker: Yes   If yes, who: Otis Contact Information:[phone]  Core Measure diagnosis present:: Yes  Vaccine assessment done and vaccines ordered if appropriate. Yes  Clergy visit ordered if patient requests: N/A  Skin issues/needs documented:N/A  Isolation needs addressed, if appropriate: N/A  Fall Prevention (Med and High risk): Care plan updated, Education given and documented and signage used: Yes  Care Plan initiated: Yes  Education Documented (Reminder to educate patient if MRSA is present on admission): N/A  Education Assessment documented:Yes  Patient has discharge needs (If yes, please explain): Yes

## 2019-07-18 NOTE — ANESTHESIA CARE TRANSFER NOTE
Patient: Tiffanie Alejo    Procedure(s):   SECTION    Diagnosis: * No pre-op diagnosis entered *  Diagnosis Additional Information: No value filed.    Anesthesia Type:   Spinal, Periph. Nerve Block for postop pain     Note:  Airway :Nasal Cannula  Patient transferred to:PACU  Handoff Report: Identifed the Patient, Identified the Reponsible Provider, Reviewed the pertinent medical history, Discussed the surgical course, Reviewed Intra-OP anesthesia mangement and issues during anesthesia, Set expectations for post-procedure period and Allowed opportunity for questions and acknowledgement of understanding      Vitals: (Last set prior to Anesthesia Care Transfer)    CRNA VITALS  2019 1421 - 2019 1501      2019             Resp Rate (set):  8                Electronically Signed By: LEILA Roque CRNA  2019  3:01 PM

## 2019-07-18 NOTE — H&P
[facility] Labor and Delivery History and Physical    Tiffanie Alejo MRN# [id number]   Age: 28 year old YOB: 1990     Date of Admission:  2019    Primary care provider: Pinky Medel           Chief Complaint:   Tiffanie Alejo is a 28 year old female who is 38w2d pregnant and being admitted for vaginal bleeding.  Pt had presented with 3-4 hours of mid/upper abdominal discomfort and decreased fetal movement.  On admission she was noted to be having mild contractions and her cervix was checked and then she noted BRB after check.   . Prenatal record/H and P reviewed with patient and unchanged.  Some nausea but no vomiting.  + FM since presentation.  Denies other GI or  sx, fever, LOF.  Uncomplicated pregnancy.            Pregnancy history:     OBSTETRIC HISTORY:    OB History    Para Term  AB Living   1 0 0 0 0 0   SAB TAB Ectopic Multiple Live Births   0 0 0 0 0      # Outcome Date GA Lbr Eugenio/2nd Weight Sex Delivery Anes PTL Lv   1 Current                EDC: Estimated Date of Delivery: 2019    Prenatal Labs:   Lab Results   Component Value Date    ABO A 2018    RH Pos 2018    AS Neg 2019    HEPBANG Nonreactive 2018    HGB 11.3 (L) 2019       GBS Status:   Lab Results   Component Value Date    GBS Negative 2019       Active Problem List  Patient Active Problem List   Diagnosis     Well woman exam with routine gynecological exam     Contraception     Early stage of pregnancy     Normal first pregnancy confirmed, currently in second trimester     Normal labor       Medication Prior to Admission  Medications Prior to Admission   Medication Sig Dispense Refill Last Dose     Prenatal Vit-Nikole-Fe Fum-FA (VINATE M) 27-1 MG TABS TAKE 1 TABLET BY MOUTH DAILY 30 tablet 1 2019 at Unknown time     psyllium (METAMUCIL) 58.6 % packet Take 1 packet by mouth daily   2019 at Unknown time     vitamin B6 (PYRIDOXINE) 100 MG  "tablet Take 100 mg by mouth daily   Past Month at Unknown time     cholestyramine (QUESTRAN) 4 g Packet Take 1 packet by mouth 3 times daily (with meals)   Unknown at Unknown time     docusate sodium (COLACE) 100 MG capsule Take 100 mg by mouth 2 times daily   Unknown at Unknown time     doxylamine (UNISOM) 25 MG TABS tablet Take 25 mg by mouth as needed   More than a month at Unknown time     metoclopramide (REGLAN) 10 MG tablet Take 5-10 mg PO TID as needed for nausea/vomiting. (Patient not taking: Reported on 6/28/2019) 30 tablet 1 More than a month at Unknown time   .        Maternal Past Medical History:     Past Medical History:   Diagnosis Date     Gluten intolerance      Lactose Intolerance 02/22/2013     Ovarian cyst 02/22/2013     Spondylolisthesis at L5-S1 level     grade 1     Urinary incontinence     urge                       Family History:   Unchanged from prenatal record            Social History:   Unchanged from prenatal record         Review of Systems:   Per HPI.  Other systems reviewed and negative         Physical Exam:     Vitals:    07/17/19 2216   BP: 116/75   BP Location: Left arm   Pulse: 83   Resp: 16   Temp: 97.7  F (36.5  C)   TempSrc: Oral   SpO2: 95%   Weight: 68 kg (150 lb)   Height: 1.575 m (5' 2\")     Chest: CTA  CV:  RRR without murmers  General:  Alert and oriented  Abdomen soft, gravid, mild fundal ttp  Ext: neg  Cervix: Per RN.  1, thick high per RN  Presentation:Cephalic  Fetal Heart Rate Tracing: reactive and reassuring  Tocometer: external monitor and frequency q 3-4 minutes  US:  Bedside.  Active fetus. Vtx.  Nml fluid.  No obvious placental abnormalities/abruption.                        Assessment: Davina Alejo is a 38w2d pregnant female admitted with 3rd trimester bleeding, possible early labor.  Likely cervical bleeding s/p cervical check/bloody show but will monitor.  No active bleeding since my arrival.    Discussed other potential etiologies such as " placental abruption and need for potential emergency CD if non reassuring fetal status or increased bleeding/clinical signs of abruption.  Continuous fetal monitoring.  Type and screen/IV done.        Ankush Godinez MD

## 2019-07-19 LAB
HGB BLD-MCNC: 11.7 G/DL (ref 11.7–15.7)
T PALLIDUM AB SER QL: NONREACTIVE

## 2019-07-19 PROCEDURE — 25000128 H RX IP 250 OP 636: Performed by: OBSTETRICS & GYNECOLOGY

## 2019-07-19 PROCEDURE — 36415 COLL VENOUS BLD VENIPUNCTURE: CPT | Performed by: OBSTETRICS & GYNECOLOGY

## 2019-07-19 PROCEDURE — 12000000 ZZH R&B MED SURG/OB

## 2019-07-19 PROCEDURE — 85018 HEMOGLOBIN: CPT | Performed by: OBSTETRICS & GYNECOLOGY

## 2019-07-19 PROCEDURE — 25000132 ZZH RX MED GY IP 250 OP 250 PS 637: Performed by: OBSTETRICS & GYNECOLOGY

## 2019-07-19 RX ADMIN — IBUPROFEN 800 MG: 800 TABLET ORAL at 20:29

## 2019-07-19 RX ADMIN — KETOROLAC TROMETHAMINE 30 MG: 30 INJECTION, SOLUTION INTRAMUSCULAR at 08:18

## 2019-07-19 RX ADMIN — DOCUSATE SODIUM 100 MG: 100 CAPSULE, LIQUID FILLED ORAL at 20:29

## 2019-07-19 RX ADMIN — KETOROLAC TROMETHAMINE 30 MG: 30 INJECTION, SOLUTION INTRAMUSCULAR at 14:30

## 2019-07-19 RX ADMIN — DOCUSATE SODIUM 100 MG: 100 CAPSULE, LIQUID FILLED ORAL at 08:19

## 2019-07-19 RX ADMIN — Medication: at 08:18

## 2019-07-19 RX ADMIN — ACETAMINOPHEN 975 MG: 325 TABLET, FILM COATED ORAL at 17:43

## 2019-07-19 RX ADMIN — KETOROLAC TROMETHAMINE 30 MG: 30 INJECTION, SOLUTION INTRAMUSCULAR at 01:50

## 2019-07-19 RX ADMIN — ACETAMINOPHEN 975 MG: 325 TABLET, FILM COATED ORAL at 00:58

## 2019-07-19 RX ADMIN — ACETAMINOPHEN 975 MG: 325 TABLET, FILM COATED ORAL at 08:49

## 2019-07-19 NOTE — PROGRESS NOTES
[facility]    Obstetrics Post-Op / Progress Note    Assessment & Plan   Assessment:  -1 Day Post-Op  Procedure(s):   SECTION    Doing well.  Clean wound dressing.  No immediate surgical complications identified.  No excessive bleeding  Pain well-controlled.  Tolerating physical therapy and rehabilitation well.    Plan:  Ambulation encouraged  Breast feeding strategies discussed  Monitor wound for signs of infection  Pain control measures as needed  Reportable signs and symptoms dicussed with the patient  Anticipate discharge in 1-2 days    Milo Wise History   Doing well.  Pain is well-controlled.  No fevers.  No history of wound drainage, warmth or significant erythema.  Good appetite.  Denies chest pain, shortness of breath, nausea or vomiting.  Ambulatory.  Breastfeeding well.    Medications     dextrose 5% lactated ringers 125 mL/hr at 19 1758     lactated ringers Stopped (19 1417)     - MEDICATION INSTRUCTIONS -       - MEDICATION INSTRUCTIONS -       NO Rho (D) immune globulin (RhoGam) needed - mother Rh POSITIVE       - MEDICATION INSTRUCTIONS -       oxytocin in 0.9% NaCl       oxytocin in 0.9% NaCl       oxytocin in 0.9% NaCl         acetaminophen  975 mg Oral Q8H     docusate sodium  100 mg Oral BID     ketorolac  30 mg Intravenous Q6H     PNV PRENATAL PLUS MULTIVITAMIN  1 tablet Oral Daily     psyllium  1 packet Oral Daily     sodium chloride (PF)  3 mL Intracatheter Q8H       Physical Exam   Temp: 97.6  F (36.4  C) Temp src: Oral BP: 104/67 Pulse: 61 Heart Rate: 68 Resp: 16 SpO2: 96 % O2 Device: None (Room air) Oxygen Delivery: 2 LPM  Vitals:    19 2216   Weight: 68 kg (150 lb)     Vital Signs with Ranges  Temp:  [96.9  F (36.1  C)-98.4  F (36.9  C)] 97.6  F (36.4  C)  Pulse:  [61] 61  Heart Rate:  [68-93] 68  Resp:  [12-26] 16  BP: ()/(64-93) 104/67  SpO2:  [94 %-98 %] 96 %  I/O last 3 completed shifts:  In: 803 [I.V.:803]  Out: 2950  [Urine:2450; Blood:500]    Uterine fundus is firm, non-tender and at the level of the umbilicus  Incision C/D/I  Extremities Non-tender  Heart is regular rate and rhythm and lungs clear to auscultation  Reflexes are 1+, equal, symmetric, without clonus  Calves are non tender  Urine output is good and clear    Data   Recent Labs   Lab Test 07/17/19 2252   ABO A   RH Pos   AS Neg     Recent Labs   Lab Test 07/19/19  0546 07/17/19 2252   HGB 11.7 12.4     Recent Labs   Lab Test 12/14/18  1037   RUQIGG 47

## 2019-07-19 NOTE — PLAN OF CARE
Assessments as charted. B/P: 104/67, T: 97.6, P: 61, R: 16. Rates pain: 2/10 at incision site. Incision: CDI . Catheter maintained patency.  Fundus firm and 3 below. Lochia: Light. Infant feeding: Exclusively pumping and not going well. Very little colostrum.     Postpartum care education provided, see Patient Education activity. Patient denies needs. Will monitor.  Face to face report given with opportunity to observe patient.    Report given to DOC Barksdale   7/19/2019  8:36 AM

## 2019-07-19 NOTE — ANESTHESIA POSTPROCEDURE EVALUATION
Patient: Tiffanie Alejo    Procedure(s):   SECTION    Diagnosis:* No pre-op diagnosis entered *  Diagnosis Additional Information: No value filed.    Anesthesia Type:  Spinal, Periph. Nerve Block for postop pain    Note:  Anesthesia Post Evaluation    Patient location during evaluation: PACU, Bedside and Floor  Patient participation: Able to fully participate in evaluation  Level of consciousness: awake and alert  Pain management: adequate  Airway patency: patent  Cardiovascular status: acceptable  Respiratory status: acceptable  Hydration status: stable  PONV: none     Anesthetic complications: None          Last vitals:  Vitals:    19 1738 19 1830 19 2200   BP: 115/65 105/72 104/67   Pulse:      Resp:  17 16   Temp:  97.6  F (36.4  C) 97.6  F (36.4  C)   SpO2:  95% 96%         Electronically Signed By: Scout Cabrera MD  2019  7:16 AM

## 2019-07-19 NOTE — PLAN OF CARE
Assessments as charted. B/P: 100/41, T: 97.8, P: 64, R: 16. Rates pain: 0/10 . Incision: Healing well, without signs of infection and no drainage. Voiding without difficulty. Fundus firm. Lochia: Light. Activity: moving with difficulty due to post op surgical pain. Infant feeding: Pt is pumping by choice and giving formula till her milk comes in.  Postpartum care education provided, see Patient Education activity. Patient denies needs. Will monitor.  Apolonia Arreguin

## 2019-07-19 NOTE — PLAN OF CARE
Frias catheter removed at 0950, pt up and voided 100ml of yellow urine at 1030.  Pads changed Linen changed

## 2019-07-19 NOTE — PLAN OF CARE
Pt up to bathroom with help, voided 400 yellow urine, dressing removed.  Incision healing, new telfa applied.

## 2019-07-19 NOTE — PLAN OF CARE
Assessments as charted. B/P: 97/53, T: 97.8, P: 70, R: 16. Rates pain: 1/10 . Incision: no drainage. Voiding without difficulty. Fundus firm. Lochia: Moderate. Activity: . Infant feeding: Both breast and formula.     LATCH Score:   Latch: 0 - No Latch  Audible Swallowing: mom choses to pump and bottle   Type of Nipple: (Breast/Nipple)   Comfort:   Hold:    Total LATCH Score:     Postpartum breastfeeding assessment completed and education provided, see Patient Education Activity.  Items included in the education are:     proper positioning and latch    effectiveness of feeding    manual expression    handling and storing breastmilk    maintenance of breastfeeding for the first 6 months    sign/symptoms of infant feeding issues requiring referral to qualified health care provider  Postpartum care education provided, see Patient Education activity. Patient denies needs. Will monitor.  Apolonia Arreguin

## 2019-07-19 NOTE — PLAN OF CARE
Pt has been up and about in room today, no c/o dizzyness.  Will not take narcotics for pain because they make her sick.  Voids without diff. Report given to oncoming shift.

## 2019-07-20 PROBLEM — T40.605A NARCOTIC-INDUCED NAUSEA AND VOMITING: Status: ACTIVE | Noted: 2019-07-20

## 2019-07-20 PROBLEM — R11.2 NARCOTIC-INDUCED NAUSEA AND VOMITING: Status: ACTIVE | Noted: 2019-07-20

## 2019-07-20 PROCEDURE — 25000132 ZZH RX MED GY IP 250 OP 250 PS 637: Performed by: ANESTHESIOLOGY

## 2019-07-20 PROCEDURE — 25000132 ZZH RX MED GY IP 250 OP 250 PS 637: Performed by: OBSTETRICS & GYNECOLOGY

## 2019-07-20 PROCEDURE — 12000000 ZZH R&B MED SURG/OB

## 2019-07-20 PROCEDURE — 25000128 H RX IP 250 OP 636: Performed by: OBSTETRICS & GYNECOLOGY

## 2019-07-20 PROCEDURE — 25000131 ZZH RX MED GY IP 250 OP 636 PS 637: Performed by: OBSTETRICS & GYNECOLOGY

## 2019-07-20 RX ORDER — ONDANSETRON 4 MG/1
4 TABLET, ORALLY DISINTEGRATING ORAL EVERY 6 HOURS PRN
Status: DISCONTINUED | OUTPATIENT
Start: 2019-07-20 | End: 2019-07-21 | Stop reason: HOSPADM

## 2019-07-20 RX ORDER — MORPHINE SULFATE 2 MG/ML
1 INJECTION, SOLUTION INTRAMUSCULAR; INTRAVENOUS
Status: DISCONTINUED | OUTPATIENT
Start: 2019-07-20 | End: 2019-07-21 | Stop reason: HOSPADM

## 2019-07-20 RX ORDER — SODIUM CHLORIDE, SODIUM LACTATE, POTASSIUM CHLORIDE, CALCIUM CHLORIDE 600; 310; 30; 20 MG/100ML; MG/100ML; MG/100ML; MG/100ML
INJECTION, SOLUTION INTRAVENOUS CONTINUOUS
Status: DISCONTINUED | OUTPATIENT
Start: 2019-07-20 | End: 2019-07-20 | Stop reason: CLARIF

## 2019-07-20 RX ORDER — HYDROXYZINE HYDROCHLORIDE 50 MG/ML
50 INJECTION, SOLUTION INTRAMUSCULAR EVERY 6 HOURS PRN
Status: DISCONTINUED | OUTPATIENT
Start: 2019-07-20 | End: 2019-07-21 | Stop reason: HOSPADM

## 2019-07-20 RX ORDER — NALOXONE HYDROCHLORIDE 0.4 MG/ML
.1-.4 INJECTION, SOLUTION INTRAMUSCULAR; INTRAVENOUS; SUBCUTANEOUS
Status: DISCONTINUED | OUTPATIENT
Start: 2019-07-20 | End: 2019-07-20 | Stop reason: CLARIF

## 2019-07-20 RX ORDER — NALOXONE HYDROCHLORIDE 0.4 MG/ML
.1-.4 INJECTION, SOLUTION INTRAMUSCULAR; INTRAVENOUS; SUBCUTANEOUS
Status: DISCONTINUED | OUTPATIENT
Start: 2019-07-20 | End: 2019-07-20

## 2019-07-20 RX ORDER — HYDROMORPHONE HYDROCHLORIDE 1 MG/ML
0.1 INJECTION, SOLUTION INTRAMUSCULAR; INTRAVENOUS; SUBCUTANEOUS ONCE
Status: DISCONTINUED | OUTPATIENT
Start: 2019-07-20 | End: 2019-07-20 | Stop reason: CLARIF

## 2019-07-20 RX ORDER — OXYCODONE HYDROCHLORIDE 5 MG/1
5 TABLET ORAL EVERY 4 HOURS PRN
Status: DISCONTINUED | OUTPATIENT
Start: 2019-07-20 | End: 2019-07-21 | Stop reason: HOSPADM

## 2019-07-20 RX ORDER — NALOXONE HYDROCHLORIDE 0.4 MG/ML
.1-.4 INJECTION, SOLUTION INTRAMUSCULAR; INTRAVENOUS; SUBCUTANEOUS
Status: DISCONTINUED | OUTPATIENT
Start: 2019-07-20 | End: 2019-07-21 | Stop reason: HOSPADM

## 2019-07-20 RX ADMIN — ACETAMINOPHEN 975 MG: 325 TABLET, FILM COATED ORAL at 00:54

## 2019-07-20 RX ADMIN — ACETAMINOPHEN 975 MG: 325 TABLET, FILM COATED ORAL at 17:25

## 2019-07-20 RX ADMIN — HYDROXYZINE HYDROCHLORIDE 50 MG: 50 INJECTION, SOLUTION INTRAMUSCULAR at 11:41

## 2019-07-20 RX ADMIN — BISACODYL 10 MG: 10 SUPPOSITORY RECTAL at 19:30

## 2019-07-20 RX ADMIN — DOCUSATE SODIUM 100 MG: 100 CAPSULE, LIQUID FILLED ORAL at 20:59

## 2019-07-20 RX ADMIN — OXYCODONE HYDROCHLORIDE 2.5 MG: 5 TABLET ORAL at 09:26

## 2019-07-20 RX ADMIN — IBUPROFEN 800 MG: 800 TABLET ORAL at 17:26

## 2019-07-20 RX ADMIN — Medication 1 TABLET: at 08:34

## 2019-07-20 RX ADMIN — OXYCODONE HYDROCHLORIDE 5 MG: 5 TABLET ORAL at 13:39

## 2019-07-20 RX ADMIN — SIMETHICONE 80 MG: 80 TABLET, CHEWABLE ORAL at 17:25

## 2019-07-20 RX ADMIN — SIMETHICONE 80 MG: 80 TABLET, CHEWABLE ORAL at 08:11

## 2019-07-20 RX ADMIN — ONDANSETRON 4 MG: 4 TABLET, ORALLY DISINTEGRATING ORAL at 13:50

## 2019-07-20 RX ADMIN — ACETAMINOPHEN 975 MG: 325 TABLET, FILM COATED ORAL at 08:12

## 2019-07-20 RX ADMIN — OXYCODONE HYDROCHLORIDE 5 MG: 5 TABLET ORAL at 17:29

## 2019-07-20 RX ADMIN — PSYLLIUM HUSK 1 PACKET: 3.4 POWDER ORAL at 19:27

## 2019-07-20 RX ADMIN — IBUPROFEN 800 MG: 800 TABLET ORAL at 11:32

## 2019-07-20 RX ADMIN — IBUPROFEN 800 MG: 800 TABLET ORAL at 04:05

## 2019-07-20 RX ADMIN — DOCUSATE SODIUM 100 MG: 100 CAPSULE, LIQUID FILLED ORAL at 08:11

## 2019-07-20 RX ADMIN — OXYCODONE HYDROCHLORIDE 2.5 MG: 5 TABLET ORAL at 08:37

## 2019-07-20 RX ADMIN — ONDANSETRON 4 MG: 4 TABLET, ORALLY DISINTEGRATING ORAL at 08:37

## 2019-07-20 RX ADMIN — OXYCODONE HYDROCHLORIDE 2.5 MG: 5 TABLET ORAL at 21:55

## 2019-07-20 NOTE — PLAN OF CARE
Assessments as charted. B/P: 95/65, T: 98.7, P: 64, R: 16. Rates pain: 3/10  now, but from 7am til now pain has been hard to control, since patient hesitant and nervous about taking narcotics due to history of sensitivity to them. Incision: Healing well, well approximated, without signs of infection and no drainage. Voiding without difficulty. Fundus Firm. Lochia: Light. Activity: moving with difficulty due to post op surgical pain. Infant feeding: Both breast milk and formula via bottle.     Postpartum pumping, breast milk feeding, and formula feeding assessment completed and education provided, see Patient Education Activity.  Items included in the education are:   manual expression  handling and storing breastmilk  maintenance of breast milk feeding for the first 6 months  sign/symptoms of infant feeding issues requiring referral to qualified health care provider  Postpartum care education provided, see Patient Education activity. Patient denies needs. Will monitor.  SHRUTHI MARS

## 2019-07-20 NOTE — PLAN OF CARE
Pt. Able to get a few hours of rest. Pumping every 2-3 hours. Using scheduled tylenol, motrin and ice for incision discomfort. Declines further analgesia. Needs encouragement to ambulate and use ISP. Educated regarding importance of ambulation and using the ISP.

## 2019-07-20 NOTE — PLAN OF CARE
Assessments as charted. B/P: 95/48[Pt. reports this is her normal[, T: 98.1, P: 64, R: 16. Rates pain: 4-5/10. Only wants to take tylenol and motrin due to past adverse effects with narcotics. Incision: Healing well, well approximated and without signs of infection. Voiding without difficulty. Fundus firm @U. Lochia: Light. Activity: moving with difficulty due to post op surgical pain. Needs encouragement to get up and walk. Did get up and shower this evening. Infant feeding: Breast pumping and feeding colostrum to infant and formula feeding per patient's feeding goals.     Postpartum care education provided, see Patient Education activity. Patient denies needs. Will monitor.  Patricia Armas

## 2019-07-20 NOTE — PLAN OF CARE
Report given with opportunity to observe patient.    Report given to Ines Armas   7/20/2019  7:13 AM

## 2019-07-20 NOTE — PROGRESS NOTES
[facility]    Obstetrics Post-Op / Progress Note    Assessment & Plan   Assessment:  -2 Days Post-Op  Procedure(s):   SECTION   Narcotic intolerance with nausea and vomiting  Poor pain control    Clean wound without signs of infection.  Normal healing wound.  No immediate surgical complications identified.  No excessive bleeding  Very poor pain control    Plan:  Ambulation encouraged  Breast feeding strategies discussed  Monitor wound for signs of infection  Pain control measures as needed  Reportable signs and symptoms dicussed with the patient  Anticipate discharge in 1-2 days  Will try Vistaril IM, along with Ibuprofen, and low dose Zoya with premedication and food.    Milo Mc     Interval History   Doing well.  Pain is controlled.  No fevers.  No history of wound drainage, warmth or significant erythema.  Good appetite.  Denies chest pain, shortness of breath, nausea or vomiting.  Breastfeeding well.    Medications     - MEDICATION INSTRUCTIONS -       NO Rho (D) immune globulin (RhoGam) needed - mother Rh POSITIVE       - MEDICATION INSTRUCTIONS -       oxytocin in 0.9% NaCl         acetaminophen  975 mg Oral Q8H     docusate sodium  100 mg Oral BID     PNV PRENATAL PLUS MULTIVITAMIN  1 tablet Oral Daily     psyllium  1 packet Oral Daily       Physical Exam   Temp: 98.1  F (36.7  C) Temp src: Oral BP: 95/65 Pulse: 64 Heart Rate: 55 Resp: 16 SpO2: 97 %      Vitals:    19 2216   Weight: 68 kg (150 lb)     Vital Signs with Ranges  Temp:  [97.6  F (36.4  C)-98.1  F (36.7  C)] 98.1  F (36.7  C)  Pulse:  [64-70] 64  Heart Rate:  [55-70] 55  Resp:  [16] 16  BP: ()/(41-65) 95/65  SpO2:  [96 %-98 %] 97 %  I/O last 3 completed shifts:  In: -   Out: 2300 [Urine:2300]    Uterine fundus is firm, non-tender and at the level of the umbilicus  Incision C/D/I  Extremities Non-tender  Heart is regular rate and rhythm and lungs clear to auscultation  Reflexes are 1+, equal, symmetric, without  clonus  Calves are non tender  Urine output good and clear    Data   Recent Labs   Lab Test 07/17/19  2252   ABO A   RH Pos   AS Neg     Recent Labs   Lab Test 07/19/19  0546 07/17/19  2252   HGB 11.7 12.4     Recent Labs   Lab Test 12/14/18  1037   RUQIGG 47

## 2019-07-20 NOTE — PLAN OF CARE
Assessments as charted. B/P: 107/70, T: 98, P: 64, R: 16. Rates pain: 2/10 . Incision: Healing well, well approximated, without signs of infection and no drainage. Voiding without difficulty. Fundus Firm. Lochia: Light. Activitybetter now that patient has pain under control . Infant feeding: parents feeding baby mostly expressed breast milk .  Postpartum care education provided, see Patient Education activity. Patient denies needs. Will monitor.  SHRUTHI MARS

## 2019-07-21 VITALS
WEIGHT: 150 LBS | HEART RATE: 64 BPM | DIASTOLIC BLOOD PRESSURE: 67 MMHG | SYSTOLIC BLOOD PRESSURE: 97 MMHG | TEMPERATURE: 97.6 F | RESPIRATION RATE: 16 BRPM | OXYGEN SATURATION: 100 % | HEIGHT: 62 IN | BODY MASS INDEX: 27.6 KG/M2

## 2019-07-21 PROCEDURE — 25000132 ZZH RX MED GY IP 250 OP 250 PS 637: Performed by: ANESTHESIOLOGY

## 2019-07-21 PROCEDURE — 25000132 ZZH RX MED GY IP 250 OP 250 PS 637: Performed by: OBSTETRICS & GYNECOLOGY

## 2019-07-21 RX ORDER — IBUPROFEN 800 MG/1
800 TABLET, FILM COATED ORAL EVERY 8 HOURS PRN
Qty: 50 TABLET | Refills: 1 | Status: SHIPPED | OUTPATIENT
Start: 2019-07-21 | End: 2022-08-11

## 2019-07-21 RX ORDER — OXYCODONE HYDROCHLORIDE 5 MG/1
5 TABLET ORAL EVERY 4 HOURS PRN
Qty: 30 TABLET | Refills: 0 | Status: SHIPPED | OUTPATIENT
Start: 2019-07-21 | End: 2019-08-06

## 2019-07-21 RX ORDER — ONDANSETRON 4 MG/1
4 TABLET, ORALLY DISINTEGRATING ORAL EVERY 8 HOURS PRN
Qty: 30 TABLET | Refills: 1 | Status: SHIPPED | OUTPATIENT
Start: 2019-07-21 | End: 2019-08-29

## 2019-07-21 RX ADMIN — DOCUSATE SODIUM 100 MG: 100 CAPSULE, LIQUID FILLED ORAL at 08:03

## 2019-07-21 RX ADMIN — SIMETHICONE 80 MG: 80 TABLET, CHEWABLE ORAL at 08:03

## 2019-07-21 RX ADMIN — ACETAMINOPHEN 975 MG: 325 TABLET, FILM COATED ORAL at 01:01

## 2019-07-21 RX ADMIN — IBUPROFEN 800 MG: 800 TABLET ORAL at 08:03

## 2019-07-21 RX ADMIN — OXYCODONE HYDROCHLORIDE 2.5 MG: 5 TABLET ORAL at 01:25

## 2019-07-21 RX ADMIN — ACETAMINOPHEN 975 MG: 325 TABLET, FILM COATED ORAL at 08:03

## 2019-07-21 RX ADMIN — OXYCODONE HYDROCHLORIDE 2.5 MG: 5 TABLET ORAL at 08:04

## 2019-07-21 RX ADMIN — IBUPROFEN 800 MG: 800 TABLET ORAL at 01:25

## 2019-07-21 RX ADMIN — OXYCODONE HYDROCHLORIDE 5 MG: 5 TABLET ORAL at 12:00

## 2019-07-21 RX ADMIN — Medication 1 TABLET: at 08:02

## 2019-07-21 NOTE — PLAN OF CARE
Pt. Verbalizes concerns about constipation. Reports being constipated for 10 days after her last surgery. Metamucil given in apple juice now and then bisacodyl suppository administered per patient's request.

## 2019-07-21 NOTE — PROGRESS NOTES
[facility]    Obstetrics Post-Op / Progress Note    Assessment & Plan   Assessment:  -3 Days Post-Op  Procedure(s):   SECTION       Doing well.  Tolerating 1/2 NIURKA  Clean wound without signs of infection.  Normal healing wound.  No immediate surgical complications identified.  No excessive bleeding  Pain well-controlled.  Tolerating physical therapy and rehabilitation well.    Plan:  Ambulation encouraged  Breast feeding strategies discussed  Monitor wound for signs of infection  Pain control measures as needed  Reportable signs and symptoms dicussed with the patient  Discharge later today    Milo Mc     Interval History   Doing well.  Pain is well-controlled.  No fevers.  No history of wound drainage, warmth or significant erythema.  Good appetite.  Denies chest pain, shortness of breath, nausea or vomiting.  Ambulatory.  Breastfeeding well.    Medications     - MEDICATION INSTRUCTIONS -       NO Rho (D) immune globulin (RhoGam) needed - mother Rh POSITIVE       - MEDICATION INSTRUCTIONS -       oxytocin in 0.9% NaCl         docusate sodium  100 mg Oral BID     PNV PRENATAL PLUS MULTIVITAMIN  1 tablet Oral Daily     psyllium  1 packet Oral Daily       Physical Exam   Temp: 97.6  F (36.4  C) Temp src: Oral BP: 97/67   Heart Rate: 73 Resp: 16 SpO2: 100 %      Vitals:    196   Weight: 68 kg (150 lb)     Vital Signs with Ranges  Temp:  [97.6  F (36.4  C)-98  F (36.7  C)] 97.6  F (36.4  C)  Heart Rate:  [73] 73  Resp:  [16] 16  BP: ()/(67-70) 97/67  SpO2:  [100 %] 100 %  No intake/output data recorded.    Uterine fundus is firm, non-tender and at the level of the umbilicus  Incision C/D/I  Extremities Non-tender  Heart is regular rate and rhythm and lungs clear to auscultation  Reflexes 1+, equal, symmetric, without clonus  Calves are non tender  Urine output is good and clear.    Data   Recent Labs   Lab Test 19   ABO A   RH Pos   AS Neg     Recent Labs   Lab Test  07/19/19  0546 07/17/19  2252   HGB 11.7 12.4     Recent Labs   Lab Test 12/14/18  1037   RUQIGG 47

## 2019-07-21 NOTE — DISCHARGE SUMMARY
[facility]    Discharge Summary  Obstetrics    Date of Admission:  2019  Date of Discharge:  2019  Discharging Provider: Milo Mc    Discharge Diagnoses   Labor at 38+ weeks  Bleeding due to placental abruption/marginal sinus bleed.   SECTION---2019  Highly sensitive-intolerant to any and all narcotics orally or IV--nausea and vomiting.    Procedure/Surgery Information   Procedure: Procedure(s):   SECTION   Surgeon(s): Surgeon(s) and Role:     * Ankush Godinez MD - Primary     History of Present Illness   Tiffanie Alejo is a 29 year old female who presented with labor and vaginal bleeding.  Clinical picture indicated placental abruption/marginal sinus bleed and delivery was remote.   indicated.    Hospital Course   The patient's postop hospital course was unremarkable.  She recovered as anticipated and experienced no post-operative complications.  On discharge, her pain was well controlled. Vaginal bleeding is similar to peak menstrual flow.  Voiding without difficulty.  Ambulating well and tolerating a normal diet.  No fever or significant wound drainage.  Breastfeeding well.  Infant is stable.  She was discharged on post-partum day # 3.    Post-partum hemoglobin:   Hemoglobin   Date Value Ref [facility] Status   2019 11.7 11.7 - 15.7 g/dL Final       Milo Mc    Discharge Disposition   Discharged to home   Condition at discharge: Good    Pending Results   Final pathology results: Pending at time of discharge  These results will be followed up by Herbert  Unresulted Labs Ordered in the Past 30 Days of this Admission     Date and Time Order Name Status Description    2019 1449 Placenta path order and indications In process           Primary Care Physician   Pinky Medel    Consultations This Hospital Stay   LACTATION IP CONSULT  ANESTHESIOLOGY IP CONSULT  HOME CARE POST PARTUM/ IP CONSULT    Discharge Orders   No discharge procedures on  file.  Discharge Medications   Current Discharge Medication List      START taking these medications    Details   ibuprofen (ADVIL/MOTRIN) 800 MG tablet Take 1 tablet (800 mg) by mouth every 8 hours as needed for pain Take with a full meal for primary pain control  Qty: 50 tablet, Refills: 1    Associated Diagnoses:  delivery delivered      ondansetron (ZOFRAN-ODT) 4 MG ODT tab Take 1 tablet (4 mg) by mouth every 8 hours as needed for nausea  Qty: 30 tablet, Refills: 1    Associated Diagnoses: Narcotic-induced nausea and vomiting      oxyCODONE (ROXICODONE) 5 MG tablet Take 1 tablet (5 mg) by mouth every 4 hours as needed for pain Take 1/2 tablet with food as a supplement to Ibuprofen for secondary pain control  Qty: 30 tablet, Refills: 0    Comments: Patient highly intolerant to any and all narcotics with nausea and vomiting, but can tolerate 2.5mg of NIURKA with food and Zofran if necessary  Associated Diagnoses:  delivery delivered         CONTINUE these medications which have NOT CHANGED    Details   Prenatal Vit-Nikole-Fe Fum-FA (VINATE M) 27-1 MG TABS TAKE 1 TABLET BY MOUTH DAILY  Qty: 30 tablet, Refills: 1    Associated Diagnoses: Family planning      psyllium (METAMUCIL) 58.6 % packet Take 1 packet by mouth daily      vitamin B6 (PYRIDOXINE) 100 MG tablet Take 100 mg by mouth daily      cholestyramine (QUESTRAN) 4 g Packet Take 1 packet by mouth 3 times daily (with meals)      docusate sodium (COLACE) 100 MG capsule Take 100 mg by mouth 2 times daily      doxylamine (UNISOM) 25 MG TABS tablet Take 25 mg by mouth as needed      metoclopramide (REGLAN) 10 MG tablet Take 5-10 mg PO TID as needed for nausea/vomiting.  Qty: 30 tablet, Refills: 1    Associated Diagnoses: Pregnancy related nausea and vomiting, antepartum           Allergies   Allergies   Allergen Reactions     Wheat Extract      Stomach pains, constipation and diarrhea

## 2019-07-21 NOTE — DISCHARGE INSTRUCTIONS
Birth Discharge Instructions  Lactation Appointment with EDVIN Michaud is on  @ 10 am in [facility]  Please make a one week Staple Removal appointment with 's office and a 6 week post partum follow up appointment with Dr. Godinez!  Refer to your New Beginnings Book for instruction as well.    Diet: You may eat a regular diet. Drink plenty of fluids.      Call your Doctor  if you have any of these symptoms:    * You soak a sanitary pad with blood within 1 hour, or you see clots larger than a golf ball.    * Bleeding that lasts more than 6 weeks.     *Bad-smelling fluid that comes out of your vagina.    *A fever above 100.4 degrees Fahrenheit (38.4 degrees Celsius) with or without chills.    * Severe pain, cramping, or tenderness in your lower belly area.    * Increased pain, swelling, redness or fluid around your stitches.    * A need to urinate more frequently (use the toilet more often), more urgently (use the toilet very quickly), or it burns when you urinate.    * Redness, swelling, or pain around a vein in your leg.    * Problems coping with sadness, anxiety, or depression.    * Problems breastfeeding, or a red or painful area on your breast.    * You have questions or concerns after you return home.      [facility]: [phone]

## 2019-07-21 NOTE — PLAN OF CARE
Patient discharged at 2:35 PM via ambulation accompanied by spouse and staff. Prescriptions filled and sent with patient upon discharge. All belongings sent with patient.     Discharge instructions reviewed with patient and spouse. Listed belongings gathered and returned to patient. Room clear.    Patient discharged to home.     Surgical Patient   Surgical Procedures during stay:  Section  Did patient receive discharge instruction on wound care and recognition of infection symptoms? Yes    MISC  Follow up appointment made:  Yes  Home and hospital aquired medications returned to patient: N/A  Patient reports pain was well managed at discharge: Yes

## 2019-07-21 NOTE — PLAN OF CARE
Assessments as charted. B/P: 111/69, T: 98, P: 64, R: 16. Rates pain: 4/10 at the incision. Using ice pack, tylenol, motrin, and 2.5 mg of oxycodone as needed for discomfort with adequate relief per patient's report. Incision: Healing well, well approximated and without signs of infection. Pt. Showered this evening without difficulty. Voiding without difficulty. Fundus firm @U-1. Lochia: Light. Activity: normal activity. Infant feeding: Bottle feeding pumped colostrum to infant every 2-3 hours . Infant tolerates feedings well.    Postpartum care education provided, see Patient Education activity. Patient denies needs. Will monitor.  Patricia Armas

## 2019-07-21 NOTE — PLAN OF CARE
Face to face report given with opportunity to observe patient.    Report given to Shani MARS   7/20/2019  7:10 PM

## 2019-07-21 NOTE — PLAN OF CARE
Assessments as charted. B/P: 97/67, T: 97.6, P: 64, R: 16. Rates pain: 2/10  Incision: Healing well. Voiding without difficulty. Fundus Firm. Lochia: Light. Activity: moving with difficulty due to post op surgical pain. Infant feeding: Both breast and formula.    Postpartum care education provided, see Patient Education activity. Patient denies needs. Will monitor.  SHRUTHI MARS

## 2019-07-24 ENCOUNTER — HOSPITAL ENCOUNTER (OUTPATIENT)
Dept: OBGYN | Facility: HOSPITAL | Age: 29
Discharge: HOME | End: 2019-07-24
Attending: OBSTETRICS & GYNECOLOGY | Admitting: OBSTETRICS & GYNECOLOGY
Payer: COMMERCIAL

## 2019-07-24 LAB — COPATH REPORT: NORMAL

## 2019-07-24 PROCEDURE — G0463 HOSPITAL OUTPT CLINIC VISIT: HCPCS

## 2019-07-24 NOTE — PATIENT INSTRUCTIONS
Volume of Intake:    Birth Weight: 7lb 5.8oz    Discharge from Hospital after delivery weight: 7lb 1.1oz   TODAY 2019    Pre-Weight: 7lb 8.8oz with outfit and diaper    Post-Weight: NA    Total Intake: NA  Output: 1 seedy mustard-seed yellow with notable void diaper changed during lactation session      FEEDING PLAN    Home Feeding Plan: Continue to feed on demand when  elicits feeding cues with expressed breast milk.  Exclusivity explained and encouraged in the early weeks to establish breastfeeding and order in milk supply.  Rooming-in encouraged with explanation of the benefits.  Continue to apply expressed breast milk and Lanolin cream to nipples after feedings for healing and comfort.  Postpartum breastfeeding assessment completed and education provided.  Items included in the education are:     effectiveness of feeding    manual expression    handling and storing breastmilk    maintenance of breastfeeding for the first 6 months    sign/symptoms of infant feeding issues requiring referral to qualified health care provider

## 2019-07-24 NOTE — LACTATION NOTE
Follow-up Lactation Consultation    Tiffanie Alejo                                                                                                   [id number]      Consultation Date: 2019     Reason for Lactation Referral: weight check, discuss milk supply    Baby's : 19    Primary Care Provider: Tiffanie-Dr. Godinez; Baby boy [place]-Dr. Medel    History of Present Illness: Tiffanie presents with 6-day old, Xander, her , and mother-in-law. Tiffanie is exclusively pumping and feeding via bottle. She has given some formula, as her supply was coming in, but goal is to give exclusive breast milk. Tiffanie is pumping every 2-3 hours, and her amount is increasing. She pumped 100 ml this morning.      MATERNAL HISTORY   History of Breast Surgery: No  Breast Changes During Pregnancy: Yes  Breast Feeding History: 1st baby  Maternal Meds: daily prenatal vitamin, ibuprofen, Zofran, oxycodone, colace    MATERNAL ASSESSMENT    Breast Size: average, symmetrical, soft after feeding and filling prior to feeding  Nipple Appearance - Left: intact  Nipple Appearance - Right: intact  Nipple Erectility - Left: erect with stimulation  Nipple Erectility - Right: erect with stimulation  Areolas Compressibility: soft  Nipple Size: average  Milk Supply: mature    INFANT ASSESSMENT    Oral Anatomy  Mouth: normal  Palate: normal  Jaw: normal  Tongue: normal    FEEDING   Feeding Time: Took a bottle of breast milk during visit  Effort to Latch: NA  Duration of Breast Feeding: NA  Results: NA    Volume of Intake:    Birth Weight: 7lb 5.8oz    Discharge from Hospital after delivery weight: 7lb 1.1oz   TODAY 2019    Pre-Weight: 7lb 8.8oz    Post-Weight: NA    Total Intake: NA  Output: 1 seedy mustard-seed yellow with notable void diaper changed during lactation session      FEEDING PLAN    Home Feeding Plan: Continue to feed on demand when  elicits feeding cues with expressed breast milk.  Exclusivity  explained and encouraged in the early weeks to establish breastfeeding and order in milk supply.  Rooming-in encouraged with explanation of the benefits.  Continue to apply expressed breast milk and Lanolin cream to nipples after feedings for healing and comfort.  Postpartum breastfeeding assessment completed and education provided.  Items included in the education are:     effectiveness of feeding    manual expression    handling and storing breastmilk    maintenance of breastfeeding for the first 6 months    sign/symptoms of infant feeding issues requiring referral to qualified health care provider    LACTATION COMMENTS   Discussed pumping and feeding, supply and demand, how to increase supply. Discussed hands-on pumping to increase supply.  Reassurance and encouragement provided in regard to mom's concerns about milk supply.  Follow-up support information provided.  Parents plan to keep  Well-Child Check with Dr. Medel today for further support and monitoring.      Face-to-face Time: 50 minutes with assessment and education.    KALYN TRIPLETT  2019  12:27 PM

## 2019-07-26 ENCOUNTER — OFFICE VISIT (OUTPATIENT)
Dept: OBGYN | Facility: OTHER | Age: 29
End: 2019-07-26
Attending: OBSTETRICS & GYNECOLOGY
Payer: COMMERCIAL

## 2019-07-26 VITALS
DIASTOLIC BLOOD PRESSURE: 64 MMHG | HEART RATE: 66 BPM | HEIGHT: 62 IN | SYSTOLIC BLOOD PRESSURE: 110 MMHG | BODY MASS INDEX: 27.44 KG/M2 | OXYGEN SATURATION: 97 %

## 2019-07-26 DIAGNOSIS — Z98.890 POST-OPERATIVE STATE: Primary | ICD-10-CM

## 2019-07-26 PROCEDURE — 99024 POSTOP FOLLOW-UP VISIT: CPT | Performed by: OBSTETRICS & GYNECOLOGY

## 2019-07-26 ASSESSMENT — PAIN SCALES - GENERAL: PAINLEVEL: MILD PAIN (2)

## 2019-07-26 NOTE — NURSING NOTE
"Chief Complaint   Patient presents with     Post-op Visit     staple removal/ C/S on 7/18/19 Boy-Xander breast feeding       Initial /64 (BP Location: Left arm, Cuff Size: Adult Regular)   Pulse 66   Ht 1.575 m (5' 2\")   LMP 10/22/2018   SpO2 97%   BMI 27.44 kg/m   Estimated body mass index is 27.44 kg/m  as calculated from the following:    Height as of this encounter: 1.575 m (5' 2\").    Weight as of 7/17/19: 68 kg (150 lb).  Medication Reconciliation: complete     Eden Quiroz    "

## 2019-07-30 NOTE — PROGRESS NOTES
"LIAM Alejo is a 29 year old female presents for post operative check. She is  1  week(s) status post .  She reports doing well and denies significant pain or bleeding.  Bowel and bladder function is satisfactory. Denies incisional problems.    O.  Blood pressure 110/64, pulse 66, height 1.575 m (5' 2\"), last menstrual period 10/22/2018, SpO2 97 %, unknown if currently breastfeeding.    Abd: soft, non-tender, non-distended. Incision clear, dry, and intact without evidence of infection.    A. /P. Satisfactory post-op check. Staples removed.   F/u 6 week PP exam or sooner prn if problems.  Remove steri strips 2 weeks.        Ankush Godinez  "

## 2019-08-06 ENCOUNTER — PRENATAL OFFICE VISIT (OUTPATIENT)
Dept: OBGYN | Facility: OTHER | Age: 29
End: 2019-08-06
Attending: NURSE PRACTITIONER
Payer: COMMERCIAL

## 2019-08-06 VITALS
BODY MASS INDEX: 24.82 KG/M2 | OXYGEN SATURATION: 98 % | SYSTOLIC BLOOD PRESSURE: 108 MMHG | DIASTOLIC BLOOD PRESSURE: 62 MMHG | HEIGHT: 62 IN | HEART RATE: 57 BPM | WEIGHT: 134.9 LBS

## 2019-08-06 PROCEDURE — 99207 ZZC NO CHARGE LOS: CPT | Performed by: NURSE PRACTITIONER

## 2019-08-06 ASSESSMENT — PAIN SCALES - GENERAL: PAINLEVEL: MILD PAIN (2)

## 2019-08-06 ASSESSMENT — MIFFLIN-ST. JEOR: SCORE: 1280.15

## 2019-08-06 NOTE — NURSING NOTE
"Chief Complaint   Patient presents with     Post Partum Exam     2 weeks        Initial /62 (BP Location: Right arm, Patient Position: Sitting, Cuff Size: Adult Regular)   Pulse 57   Ht 1.575 m (5' 2\")   Wt 61.2 kg (134 lb 14.4 oz)   LMP 10/22/2018   SpO2 98%   BMI 24.67 kg/m   Estimated body mass index is 24.67 kg/m  as calculated from the following:    Height as of this encounter: 1.575 m (5' 2\").    Weight as of this encounter: 61.2 kg (134 lb 14.4 oz).  Medication Reconciliation: complete     Patricia Hernández  '    "

## 2019-08-06 NOTE — PROGRESS NOTES
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  Tiffanie arrived today for a 2 week postpartum visit. She states her spouse has returned back to work.  Baby: Boy Xander 7# 5.8oz  [place]  Depression Score: 6-denies any issues  Breastfeeding: Pumping and feeding breast milk exclusively   Bladder Problems: No  Bowel Problems: No  Incision: Healing well, no s/s of infection, no drainage, steri strips intact.  Vaginal Flow: Small  Startex: No  Contraception: Full counseling on contraceptives completed, patient is undecided and will follow up at 6 week appointment.  Emotional Adjustment: Doing well, Happy/Tired  General: Pleasant, no acute distress  ABD: Soft,Nontender, nondistended  Pelvic: Deferred  Rectovaginal: Deferred   Plan:   Will follow up with lactation as needed.  Continue current restrictions  Follow up regarding contraceptives at next appointment  RTC for scheduled 6 week PP appointment

## 2019-08-07 NOTE — PATIENT INSTRUCTIONS
"BREASTFEEDING TIPS  1. Breastfeed every 2-4 hours. If your baby is sleepy - use breast compression, push on chin to \"start up\" baby, switch breasts, undress to diaper and wake before relatching.   Some babies \"cluster\" feed every 1 hour for a while- this is normal. Feed your baby whenever he/she is awake-  even if every hour for a while. This frequent feeding will help you make more milk and encourage your baby to sleep for longer stretches later in the evening or night.    - Position your baby close to you with pillows so he/she is facing you -belly to belly laying horizontally across your lap at the level of your breast and looking a bit \"upwards\" to your breast   -One hand holds the baby's neck behind the ears and the other hand holds your breast  -Baby's nose should start out pointing to your nipple before latching  - Hold your breast in a \"sandwich\" position by gently squeezing your breast in an oval shape and make sure your hands are not covering the areola  This \"nipple sandwich\" will make it easier for your breast to fit inside the baby's mouth-making latching more comfortable for you and baby and preventing sore nipples. Your baby should take a \"mouthful\" of breast!  - You may want to use hand expression to \"prime the pump\" and get a drip of milk out on your nipple to wake baby   (see website: newborns.[employer].edu/Breastfeeding/HandExpression.html)  - Swipe your nipple on baby's upper lip and wait for a BIG open mouth  - YOU bring baby to the breast (hold baby's neck with your fingers just below the ears) and bring baby's head to the breast--leading with the chin.  Try to avoid pushing your breast into baby's mouth- bring baby to you instead!  - Aim to get your baby's bottom lip LOW DOWN ON AREOLA (baby's upper lip just needs to \"clear\" the nipple) .   Your baby should latch onto the areola and NOT just the nipple. That way your baby gets more milk and you don't get sore nipples!      Useful web " sites:  Www.infantrisk.com  Www.aap.org  Www.ibreastfeeding.com  Www.health.[facility].mn.us

## 2019-08-19 ENCOUNTER — MYC MEDICAL ADVICE (OUTPATIENT)
Dept: OBGYN | Facility: OTHER | Age: 29
End: 2019-08-19

## 2019-08-20 ENCOUNTER — MYC MEDICAL ADVICE (OUTPATIENT)
Dept: FAMILY MEDICINE | Facility: OTHER | Age: 29
End: 2019-08-20

## 2019-08-20 DIAGNOSIS — Z30.09 COUNSELING FOR BIRTH CONTROL, ORAL CONTRACEPTIVES: Primary | ICD-10-CM

## 2019-08-20 RX ORDER — ACETAMINOPHEN AND CODEINE PHOSPHATE 120; 12 MG/5ML; MG/5ML
0.35 SOLUTION ORAL DAILY
Qty: 84 TABLET | Refills: 3 | Status: SHIPPED | OUTPATIENT
Start: 2019-08-20 | End: 2019-10-21

## 2019-08-29 ENCOUNTER — PRENATAL OFFICE VISIT (OUTPATIENT)
Dept: OBGYN | Facility: OTHER | Age: 29
End: 2019-08-29
Attending: OBSTETRICS & GYNECOLOGY
Payer: COMMERCIAL

## 2019-08-29 VITALS
DIASTOLIC BLOOD PRESSURE: 68 MMHG | SYSTOLIC BLOOD PRESSURE: 100 MMHG | BODY MASS INDEX: 24.66 KG/M2 | WEIGHT: 134 LBS | HEIGHT: 62 IN | HEART RATE: 56 BPM

## 2019-08-29 PROBLEM — R11.2 NARCOTIC-INDUCED NAUSEA AND VOMITING: Status: RESOLVED | Noted: 2019-07-20 | Resolved: 2019-08-29

## 2019-08-29 PROBLEM — T40.605A NARCOTIC-INDUCED NAUSEA AND VOMITING: Status: RESOLVED | Noted: 2019-07-20 | Resolved: 2019-08-29

## 2019-08-29 PROBLEM — Z34.02 NORMAL FIRST PREGNANCY CONFIRMED, CURRENTLY IN SECOND TRIMESTER: Status: RESOLVED | Noted: 2019-01-18 | Resolved: 2019-08-29

## 2019-08-29 PROCEDURE — 99207 ZZC POST PARTUM EXAM: CPT | Performed by: OBSTETRICS & GYNECOLOGY

## 2019-08-29 ASSESSMENT — MIFFLIN-ST. JEOR: SCORE: 1276.07

## 2019-08-29 ASSESSMENT — PAIN SCALES - GENERAL: PAINLEVEL: NO PAIN (0)

## 2019-08-29 NOTE — PROGRESS NOTES
"SUBJECTIVE:  Tiffanie Alejo is a 29 year old female P1 here for a postpartum visit.  She had a  Section   delivering a healthy baby boy  Currently no complaints and doing well.    Today's Depression Rating was 5    delivery complications:  No  breast feeding:  Yes  bladder problems:  No  bowel problems/hemorrhoids:  Yes  episiotomy/laceration/incision healed? Yes  vaginal flow:  None  [place]:  No  contraception:  oral contraceptive  emotional adjustment:  doing well and happy      OBJECTIVE:  Blood pressure 100/68, pulse 56, height 1.575 m (5' 2\"), weight 60.8 kg (134 lb), last menstrual period 10/22/2018, unknown if currently breastfeeding.   General - pleasant female in no acute distress.  Abdomen - soft, nontender, nondistended, no hepatosplenomegaly.  Pelvic - Deferred    ASSESSMENT:  normal postpartum exam.  Released from pregnancy related restrictions.  Needs f/u Pap smear.  Due in 4 months.  H/o HR HPV.  Scheduled.     PLAN:  Discussed kegel exercises   May resume normal activities without restrictions  Pap smear Not Done today    The patient will use oral contraceptive for birth control. Full counseling was provided, and all questions answered. Compliance is strongly emphasized.  Return to clinic in one year for an annual, when due for a pap smear or PRN.    Ankush Godinez MD  "

## 2019-08-29 NOTE — NURSING NOTE
"Chief Complaint   Patient presents with     Post Partum Exam       Initial /68   Pulse 56   Ht 1.575 m (5' 2\")   Wt 60.8 kg (134 lb)   LMP 10/22/2018   BMI 24.51 kg/m   Estimated body mass index is 24.51 kg/m  as calculated from the following:    Height as of this encounter: 1.575 m (5' 2\").    Weight as of this encounter: 60.8 kg (134 lb).  Medication Reconciliation: complete   Jennifer Elliott LPN      "

## 2019-10-04 ENCOUNTER — MYC MEDICAL ADVICE (OUTPATIENT)
Dept: FAMILY MEDICINE | Facility: OTHER | Age: 29
End: 2019-10-04

## 2019-10-04 NOTE — TELEPHONE ENCOUNTER
We should get her mychart for [place] so these things can be documented in his chart instead of hers  If she has any cracked nipples this can happen  Follow-up in clinic if it continues

## 2019-10-09 ENCOUNTER — MEDICAL CORRESPONDENCE (OUTPATIENT)
Dept: HEALTH INFORMATION MANAGEMENT | Facility: CLINIC | Age: 29
End: 2019-10-09

## 2019-10-21 DIAGNOSIS — Z30.09 COUNSELING FOR BIRTH CONTROL, ORAL CONTRACEPTIVES: ICD-10-CM

## 2019-10-23 ENCOUNTER — MYC REFILL (OUTPATIENT)
Dept: OBGYN | Facility: OTHER | Age: 29
End: 2019-10-23

## 2019-10-23 DIAGNOSIS — Z30.09 COUNSELING FOR BIRTH CONTROL, ORAL CONTRACEPTIVES: ICD-10-CM

## 2019-10-24 RX ORDER — ACETAMINOPHEN AND CODEINE PHOSPHATE 120; 12 MG/5ML; MG/5ML
SOLUTION ORAL
Qty: 84 TABLET | Refills: 0 | Status: SHIPPED | OUTPATIENT
Start: 2019-10-24 | End: 2020-03-04

## 2019-10-25 RX ORDER — ACETAMINOPHEN AND CODEINE PHOSPHATE 120; 12 MG/5ML; MG/5ML
0.35 SOLUTION ORAL DAILY
Qty: 84 TABLET | Refills: 3 | OUTPATIENT
Start: 2019-10-25

## 2019-11-02 ENCOUNTER — HEALTH MAINTENANCE LETTER (OUTPATIENT)
Age: 29
End: 2019-11-02

## 2019-12-11 ENCOUNTER — MEDICAL CORRESPONDENCE (OUTPATIENT)
Dept: HEALTH INFORMATION MANAGEMENT | Facility: CLINIC | Age: 29
End: 2019-12-11

## 2020-01-28 ENCOUNTER — OFFICE VISIT (OUTPATIENT)
Dept: OBGYN | Facility: OTHER | Age: 30
End: 2020-01-28
Attending: NURSE PRACTITIONER
Payer: COMMERCIAL

## 2020-01-28 VITALS
WEIGHT: 120 LBS | DIASTOLIC BLOOD PRESSURE: 60 MMHG | HEART RATE: 88 BPM | SYSTOLIC BLOOD PRESSURE: 101 MMHG | HEIGHT: 62 IN | OXYGEN SATURATION: 97 % | BODY MASS INDEX: 22.08 KG/M2

## 2020-01-28 DIAGNOSIS — Z12.4 SCREENING FOR CERVICAL CANCER: Primary | ICD-10-CM

## 2020-01-28 PROCEDURE — 87624 HPV HI-RISK TYP POOLED RSLT: CPT | Performed by: NURSE PRACTITIONER

## 2020-01-28 PROCEDURE — 88142 CYTOPATH C/V THIN LAYER: CPT | Performed by: NURSE PRACTITIONER

## 2020-01-28 PROCEDURE — 99212 OFFICE O/P EST SF 10 MIN: CPT | Performed by: NURSE PRACTITIONER

## 2020-01-28 ASSESSMENT — PAIN SCALES - GENERAL: PAINLEVEL: NO PAIN (0)

## 2020-01-28 ASSESSMENT — MIFFLIN-ST. JEOR: SCORE: 1212.57

## 2020-01-28 NOTE — PROGRESS NOTES
"[facility]                HPI   Here for follow up pa and HPV.  Other HR HPV positive last year.  Also notes small breast lump at 12:oo on left breast for the past month or two.  Currently breastfeeding.  Denies signs of infection.  Mother with breast cancer at age 52 and family opted not to have genetic testing done.              Medications:     Current Outpatient Medications Ordered in Epic   Medication     cholestyramine (QUESTRAN) 4 g Packet     docusate sodium (COLACE) 100 MG capsule     ibuprofen (ADVIL/MOTRIN) 800 MG tablet     norethindrone (MICRONOR) 0.35 MG tablet     Prenatal Vit-Nikole-Fe Fum-FA (VINATE M) 27-1 MG TABS     No current Epic-ordered facility-administered medications on file.                 Allergies:   Wheat extract         Review of Systems:   The 5 point Review of Systems is negative other than noted in the HPI                     Physical Exam:   Blood pressure 101/60, pulse 88, height 1.575 m (5' 2\"), weight 54.4 kg (120 lb), SpO2 97 %, unknown if currently breastfeeding.  Constitutional:   awake, alert, cooperative, no apparent distress, and appears stated age     Chest / Breast:   breasts symmetrical, skin without lesion  Left breast with 1/2cm lump at 12:00.  No redness or warmth.         Genitounirinary:   External Genitalia:  General appearance; normal, Lesions absent  Vagina:  Discharge absent  Cervix:  Discharge absent, Tenderness absent             Assessment and Plan:   Cervical cancer screening - plan colposcopy if continues to be HPV+    Left breast lump: warm packs bid and massage toward nipple during breastfeeding.  If lump does not resolve follow up is recommended with possible left breast US.      Daisy Schilling NP, CFNP  1/28/2020  11:40 AM  "

## 2020-01-28 NOTE — PATIENT INSTRUCTIONS
Patient Education     When You Have an Abnormal Pap Test    The Pap test is a screening test that checks for cell changes in the cervix, the opening of the uterus. In some cases, it checks for a virus that can cause cervical cancer. If your Pap results were abnormal, you may be worried. But there is no reason to panic. An abnormal Pap test result can mean many things. It may be due to changes (inflammation) caused by normal cell repair or infection. Or you may have a problem called dysplasia that could become cervical cancer. If so, know that dysplasia tends to progress very slowly before becoming cervical cancer. That s why it s so important to have Pap tests as often as directed. Pap tests can show cell changes in the cervix early, when treatment is most effective.  Talk with your healthcare provider  Be sure to discuss your results with your healthcare provider. Find out about any follow-up tests you ll need. You may be asked to come back for a second Pap test in a year, giving your cervix time to repair itself. Or you may be scheduled for a colposcopy exam that allows your healthcare provider to get a closer look at your cervix. Your healthcare provider may also take a biopsy of your cervix, which is sent to a lab for further testing. In either case, be sure to keep your follow-up visits. They are one of your best safeguards against future problems.  Understanding your risk  Some lifestyle choices can increase your risk of abnormal cell changes. Did you start having sex at a young age? Have you had many sexual partners? Have you had sex without using a latex condom? Do you smoke? If you answered yes to any of these questions, you are more at risk. One of the most common reasons for an abnormal Pap result is infection with the human papillomavirus (HPV). If your Pap results suggest HPV, further testing may be needed.     The Pap test  During the test:    An instrument called a speculum is inserted into the  vagina to hold it open. This lets your healthcare provider see the cervix.    A small brush, spatula, or swab is used to take cells from several areas of the cervix. The cells are put into a liquid or on a slide. They are then sent to a lab where they are studied for changes. Your healthcare provider will contact you with the results.   Date Last Reviewed: 8/1/2017 2000-2019 [employer]. [address] [place] [address], [place] [id number]. All rights reserved. This information is not intended as a substitute for professional medical care. Always follow your healthcare professional's instructions.

## 2020-01-28 NOTE — NURSING NOTE
"Chief Complaint   Patient presents with     Repeat Pap Smear       Initial /60 (BP Location: Right arm, Patient Position: Sitting, Cuff Size: Adult Regular)   Pulse 88   Ht 1.575 m (5' 2\")   Wt 54.4 kg (120 lb)   SpO2 97%   BMI 21.95 kg/m   Estimated body mass index is 21.95 kg/m  as calculated from the following:    Height as of this encounter: 1.575 m (5' 2\").    Weight as of this encounter: 54.4 kg (120 lb).  Medication Reconciliation:       Patricia Hernández LPN    "

## 2020-01-31 LAB
COPATH REPORT: NORMAL
PAP: NORMAL

## 2020-02-05 ENCOUNTER — MEDICAL CORRESPONDENCE (OUTPATIENT)
Dept: HEALTH INFORMATION MANAGEMENT | Facility: CLINIC | Age: 30
End: 2020-02-05

## 2020-02-05 ENCOUNTER — ALLIED HEALTH/NURSE VISIT (OUTPATIENT)
Dept: FAMILY MEDICINE | Facility: OTHER | Age: 30
End: 2020-02-05
Attending: FAMILY MEDICINE
Payer: COMMERCIAL

## 2020-02-05 DIAGNOSIS — Z23 NEED FOR PROPHYLACTIC VACCINATION AND INOCULATION AGAINST INFLUENZA: Primary | ICD-10-CM

## 2020-02-05 PROCEDURE — 90686 IIV4 VACC NO PRSV 0.5 ML IM: CPT

## 2020-02-05 PROCEDURE — 90471 IMMUNIZATION ADMIN: CPT

## 2020-02-07 ENCOUNTER — MYC MEDICAL ADVICE (OUTPATIENT)
Dept: FAMILY MEDICINE | Facility: OTHER | Age: 30
End: 2020-02-07

## 2020-02-10 NOTE — TELEPHONE ENCOUNTER
Ok to see nephews after their fever is gone.    Weaning -- start by cutting out one feed every 2-5 days once solid food has been started (either increasing time between feeds or shortening the feed itself).

## 2020-02-17 ENCOUNTER — MYC MEDICAL ADVICE (OUTPATIENT)
Dept: OBGYN | Facility: OTHER | Age: 30
End: 2020-02-17

## 2020-02-18 ENCOUNTER — OFFICE VISIT (OUTPATIENT)
Dept: OBGYN | Facility: OTHER | Age: 30
End: 2020-02-18
Attending: OBSTETRICS & GYNECOLOGY
Payer: COMMERCIAL

## 2020-02-18 VITALS
HEIGHT: 62 IN | BODY MASS INDEX: 22.26 KG/M2 | WEIGHT: 121 LBS | HEART RATE: 72 BPM | SYSTOLIC BLOOD PRESSURE: 108 MMHG | DIASTOLIC BLOOD PRESSURE: 60 MMHG

## 2020-02-18 DIAGNOSIS — R87.810 CERVICAL HIGH RISK HUMAN PAPILLOMAVIRUS (HPV) DNA TEST POSITIVE: Primary | ICD-10-CM

## 2020-02-18 PROCEDURE — 88305 TISSUE EXAM BY PATHOLOGIST: CPT | Mod: TC | Performed by: OBSTETRICS & GYNECOLOGY

## 2020-02-18 PROCEDURE — 99214 OFFICE O/P EST MOD 30 MIN: CPT | Mod: 25 | Performed by: OBSTETRICS & GYNECOLOGY

## 2020-02-18 PROCEDURE — 57455 BIOPSY OF CERVIX W/SCOPE: CPT | Performed by: OBSTETRICS & GYNECOLOGY

## 2020-02-18 ASSESSMENT — MIFFLIN-ST. JEOR: SCORE: 1217.1

## 2020-02-18 ASSESSMENT — PAIN SCALES - GENERAL: PAINLEVEL: NO PAIN (0)

## 2020-02-18 NOTE — NURSING NOTE
"Chief Complaint   Patient presents with     Consult     Carlos       Initial /60   Pulse 72   Ht 1.575 m (5' 2\")   Wt 54.9 kg (121 lb)   BMI 22.13 kg/m   Estimated body mass index is 22.13 kg/m  as calculated from the following:    Height as of this encounter: 1.575 m (5' 2\").    Weight as of this encounter: 54.9 kg (121 lb).  Medication Reconciliation: complete  Jennifer Elliott LPN    "

## 2020-02-18 NOTE — PROGRESS NOTES
"Tiffanie Alejo is a 29 year old female P1   who presents for abnormal pap smear evaluation referred by Daisy Schilling NP.     Pap smear 1 months ago showed: normal and with high risk HPV present: (other). The prior pap showed normal and with high risk HPV present: (other).   This will be her initial colposcopy.    No LMP recorded. (Menstrual status: Breast Feeding).   UPT today is not done    Type of contraception: oral contraceptive  Past GYN history:  HPV  Prior cervical/vaginal disease: Normal exam without visible pathology.  Prior cervical treatment: no treatment.  Tobacco use:No    PMH, Fam Hx, Soc Hx Reviewed.    ROS:  Neg GI/    PROCEDURE:  Before the procedure, it was ensured that the patient was educated regarding the nature of her findings to date, the implications, and what was to be done. She has been made aware of the role of HPV, the natural history of infection, ways to minimize her future risk, the effect of HPV on the cervix, and treatment options available should they be indicated.  The details of the colposcopic procedure were reviewed.  All questions were answered before proceeding, and informed consent was therefore obtained.    Speculum placed in vagina and excellent visualization of cervix   acheived, cervix swabbed x 3 with acetic acid solution.    FINDINGS:  EXAM:  Blood pressure 108/60, pulse 72, height 1.575 m (5' 2\"), weight 54.9 kg (121 lb), unknown if currently breastfeeding.  BMI= Body mass index is 22.13 kg/m .  No LMP recorded. (Menstrual status: Breast Feeding).  General - pleasant female in no acute distress.  Neurological - alert and oriented X 3  Psychiatric - normal mood and affect    Pelvic-  Cervix: acetowhitening noted 9-1:00.  Biopsy done at 9 and 12:00      Pap repeated?:  No  SCJ seen?:  yes    ECC done?:  No  Lugol's solution used?:  Yes   Satisfactory examination?:  yes      ASSESSMENT: HPV related changes/KATY 1.    PLAN: specimens labelled and sent to Pathology, " will base further treatment on Pathology findings, treatment options discussed with patient, post biopsy instructions given to patient and call to discuss Pathology results.  I spent 25 minutes on this patient's visit (exclusive of separately billed services/procedures) and over half of this time was spent in face-to-face counseling regarding her diagnosis, treatment options with emphasis on  risks and benefits of each, prognosis and importance of compliance.    Discussed cervical dysplasia/HPV, importance of follow up.  Handouts and my card and phone number given to patient.  She will call if she has not heard results within 2 weeks.    Ankush Godinez MD

## 2020-02-19 PROBLEM — Z34.90 EARLY STAGE OF PREGNANCY: Status: RESOLVED | Noted: 2018-12-03 | Resolved: 2020-02-19

## 2020-02-19 PROBLEM — Z37.9 NORMAL LABOR: Status: RESOLVED | Noted: 2019-07-17 | Resolved: 2020-02-19

## 2020-02-19 LAB — COPATH REPORT: NORMAL

## 2020-02-28 DIAGNOSIS — Z30.09 COUNSELING FOR BIRTH CONTROL, ORAL CONTRACEPTIVES: ICD-10-CM

## 2020-03-02 NOTE — TELEPHONE ENCOUNTER
micronor      Last Written Prescription Date:  10/24/19  Last Fill Quantity: 84,   # refills: 0  Last Office Visit: 2/18/20  Future Office visit:

## 2020-03-04 RX ORDER — ACETAMINOPHEN AND CODEINE PHOSPHATE 120; 12 MG/5ML; MG/5ML
SOLUTION ORAL
Qty: 84 TABLET | Refills: 0 | Status: SHIPPED | OUTPATIENT
Start: 2020-03-04 | End: 2020-05-05

## 2020-05-05 DIAGNOSIS — Z30.09 COUNSELING FOR BIRTH CONTROL, ORAL CONTRACEPTIVES: ICD-10-CM

## 2020-05-05 RX ORDER — ACETAMINOPHEN AND CODEINE PHOSPHATE 120; 12 MG/5ML; MG/5ML
SOLUTION ORAL
Qty: 84 TABLET | Refills: 0 | Status: SHIPPED | OUTPATIENT
Start: 2020-05-05 | End: 2021-04-29

## 2020-07-20 ENCOUNTER — MYC MEDICAL ADVICE (OUTPATIENT)
Dept: OBGYN | Facility: OTHER | Age: 30
End: 2020-07-20

## 2020-07-20 DIAGNOSIS — Z30.011 ENCOUNTER FOR INITIAL PRESCRIPTION OF CONTRACEPTIVE PILLS: Primary | ICD-10-CM

## 2020-07-22 RX ORDER — NORETHINDRONE AND ETHINYL ESTRADIOL 1 MG-35MCG
1 KIT ORAL DAILY
Qty: 84 TABLET | Refills: 3 | Status: SHIPPED | OUTPATIENT
Start: 2020-07-22 | End: 2021-04-23

## 2020-08-12 ENCOUNTER — NURSE TRIAGE (OUTPATIENT)
Dept: FAMILY MEDICINE | Facility: OTHER | Age: 30
End: 2020-08-12

## 2020-08-12 NOTE — TELEPHONE ENCOUNTER
COVID 19 Nurse Triage Plan/Patient Instructions    Please be aware that novel coronavirus (COVID-19) may be circulating in the community. If you develop symptoms such as fever, cough, or SOB or if you have concerns about the presence of another infection including coronavirus (COVID-19), please contact your health care provider or visit [email or web address].     Disposition/Instructions    Home care recommended. Follow home care protocol based instructions.    Thank you for taking steps to prevent the spread of this virus.  o Limit your contact with others.  o Wear a simple mask to cover your cough.  o Wash your hands well and often.    Resources    [facility]: About COVID-19: [email or web address]    CDC: What to Do If You're Sick: www.cdc.gov/coronavirus/2019-ncov/about/steps-when-sick.html    CDC: Ending Home Isolation: www.cdc.gov/coronavirus/2019-ncov/hcp/disposition-in-home-patients.html     CDC: Caring for Someone: www.cdc.gov/coronavirus/2019-ncov/if-you-are-sick/care-for-someone.html     [facility]: Interim Guidance for Hospital Discharge to Home: www.[email or web address]/diseases/[email or web address]/hcp/hospdischarge.pdf    [facility] clinical trials (COVID-19 research studies): [email or web address].[email or web address]-clinical-trials     Below are the COVID-19 hotlines at the [place] [facility] ([facility]). Interpreters are available.   o For health questions: Call 125-929-3861 or 1-853.264.2995 (7 a.m. to 7 p.m.)  o For questions about schools and childcare: Call 291-303-3855 or 1-201.158.1692 (7 a.m. to 7 p.m.)                     Reason for Disposition    [1] COVID-19 EXPOSURE (Close Contact) AND [2] within last 14 days BUT [3] NO symptoms    Additional Information    Negative: COVID-19 has been diagnosed by a healthcare provider (HCP)    Negative: COVID-19 lab test positive    Negative: [1] Symptoms of COVID-19 (e.g., cough, fever, SOB, or others) AND [2] lives in an area with community spread     "Negative: [1] Symptoms of COVID-19 (e.g., cough, fever, SOB, or others) AND [2] within 14 days of EXPOSURE (close contact) with diagnosed or suspected COVID-19 patient    Negative: [1] Symptoms of COVID-19 (e.g., cough, fever, SOB, or others) AND [2] within 14 days of travel from high-risk area for COVID-19 community spread (identified by CDC)    Negative: [1] Difficulty breathing (shortness of breath) occurs AND [2] onset > 14 days after COVID-19 EXPOSURE (Close Contact) AND [3] no community spread where patient lives    Negative: [1] Dry cough occurs AND [2] onset > 14 days after COVID-19 EXPOSURE AND [3] no community spread where patient lives    Negative: [1] Wet cough (i.e., white-yellow, yellow, green, or cheyanne colored sputum) AND [2] onset > 14 days after COVID-19 EXPOSURE AND [3] no community spread where patient lives    Negative: [1] Common cold symptoms AND [2] onset > 14 days after COVID-19 EXPOSURE AND [3] no community spread where patient lives    Negative: [1] COVID-19 EXPOSURE (Close Contact) within last 14 days AND [2] needs COVID-19 lab test to return to work AND [3] NO symptoms    Negative: [1] COVID-19 EXPOSURE (Close Contact) within last 14 days AND [2] exposed person is a healthcare worker who was NOT using all recommended personal protective equipment (i.e., a respirator-N95 mask, eye protection, gloves, and gown) AND [3] NO symptoms    Answer Assessment - Initial Assessment Questions  1. CLOSE CONTACT: \"Who is the person with the confirmed or suspected COVID-19 infection that you were exposed to?\"      Friend   2. PLACE of CONTACT: \"Where were you when you were exposed to COVID-19?\" (e.g., home, school, medical waiting room; which city?)      Inside and outside  3. TYPE of CONTACT: \"How much contact was there?\" (e.g., sitting next to, live in same house, work in same office, same building)      Very close contact  4. DURATION of CONTACT: \"How long were you in contact with the COVID-19 " "patient?\" (e.g., a few seconds, passed by person, a few minutes, live with the patient)      hours  5. DATE of CONTACT: \"When did you have contact with a COVID-19 patient?\" (e.g., how many days ago)      8/9/20  6. TRAVEL: \"Have you traveled out of the country recently?\" If so, \"When and where?\"      * Also ask about out-of-state travel, since the [facility] has identified some high-risk cities for community spread in the .      * Note: Travel becomes less relevant if there is widespread community transmission where the patient lives.      no  7. COMMUNITY SPREAD: \"Are there lots of cases of COVID-19 (community spread) where you live?\" (See public health department website, if unsure)        mild  8. SYMPTOMS: \"Do you have any symptoms?\" (e.g., fever, cough, breathing difficulty)      no  9. PREGNANCY OR POSTPARTUM: \"Is there any chance you are pregnant?\" \"When was your last menstrual period?\" \"Did you deliver in the last 2 weeks?\"      no  10. HIGH RISK: \"Do you have any heart or lung problems? Do you have a weak immune system?\" (e.g., CHF, COPD, asthma, HIV positive, chemotherapy, renal failure, diabetes mellitus, sickle cell anemia)        Denies all    Protocols used: CORONAVIRUS (COVID-19) EXPOSURE-A- 5.16.20      "

## 2020-08-13 ENCOUNTER — OFFICE VISIT (OUTPATIENT)
Dept: FAMILY MEDICINE | Facility: OTHER | Age: 30
End: 2020-08-13
Attending: FAMILY MEDICINE
Payer: COMMERCIAL

## 2020-08-13 DIAGNOSIS — Z20.822 EXPOSURE TO COVID-19 VIRUS: Primary | ICD-10-CM

## 2020-08-13 PROCEDURE — U0003 INFECTIOUS AGENT DETECTION BY NUCLEIC ACID (DNA OR RNA); SEVERE ACUTE RESPIRATORY SYNDROME CORONAVIRUS 2 (SARS-COV-2) (CORONAVIRUS DISEASE [COVID-19]), AMPLIFIED PROBE TECHNIQUE, MAKING USE OF HIGH THROUGHPUT TECHNOLOGIES AS DESCRIBED BY CMS-2020-01-R: HCPCS | Performed by: FAMILY MEDICINE

## 2020-08-15 LAB
SARS-COV-2 RNA SPEC QL NAA+PROBE: NOT DETECTED
SPECIMEN SOURCE: NORMAL

## 2020-11-14 ENCOUNTER — HEALTH MAINTENANCE LETTER (OUTPATIENT)
Age: 30
End: 2020-11-14

## 2021-01-01 NOTE — PATIENT INSTRUCTIONS
"BREASTFEEDING TIPS  1. Breastfeed every 2-4 hours. If your baby is sleepy - use breast compression, push on chin to \"start up\" baby, switch breasts, undress to diaper and wake before relatching.   Some babies \"cluster\" feed every 1 hour for a while- this is normal. Feed your baby whenever he/she is awake-  even if every hour for a while. This frequent feeding will help you make more milk and encourage your baby to sleep for longer stretches later in the evening or night.    - Position your baby close to you with pillows so he/she is facing you -belly to belly laying horizontally across your lap at the level of your breast and looking a bit \"upwards\" to your breast   -One hand holds the baby's neck behind the ears and the other hand holds your breast  -Baby's nose should start out pointing to your nipple before latching  - Hold your breast in a \"sandwich\" position by gently squeezing your breast in an oval shape and make sure your hands are not covering the areola  This \"nipple sandwich\" will make it easier for your breast to fit inside the baby's mouth-making latching more comfortable for you and baby and preventing sore nipples. Your baby should take a \"mouthful\" of breast!  - You may want to use hand expression to \"prime the pump\" and get a drip of milk out on your nipple to wake baby   (see website: newborns.[place].edu/Breastfeeding/HandExpression.html)  - Swipe your nipple on baby's upper lip and wait for a BIG open mouth  - YOU bring baby to the breast (hold baby's neck with your fingers just below the ears) and bring baby's head to the breast--leading with the chin.  Try to avoid pushing your breast into baby's mouth- bring baby to you instead!  - Aim to get your baby's bottom lip LOW DOWN ON AREOLA (baby's upper lip just needs to \"clear\" the nipple) .   Your baby should latch onto the areola and NOT just the nipple. That way your baby gets more milk and you don't get sore nipples!      Useful web " sites:  Www.infantrisk.com  Www.aap.org  Www.ibreastfeeding.com  Www.[facility].mn.  Patient Education     Breastfeeding FAQs  How often should I nurse?  Feed your  whenever he or she show signs of hunger. A general guideline is to nurse around 8 to 12 times every 24 hours, or about every 2 to 3 hours. With time and patience, every mother-baby pair will develop their own schedule and feeding pattern.  How many months should I nurse?  The American College of Obstetricians and Gynecologists and the American Academy of Pediatrics both recommend that mothers start breastfeeding as soon as possible after birth. Both support  breastfeeding-only  for the first 6 months of life. At 6 months, your baby may slowly start having solid foods as well. But continue breastfeeding at least through the baby s first birthday. After the first birthday, you and your baby can stop or continue breastfeeding as long as you want it to happen.  Is my baby getting enough milk?  There are a few ways to check if your baby is getting enough milk.  Latching on  You know your baby is getting milk if you hear gulping and swallowing sounds, not just sucking. Look for your baby steadily moving his or her jaw open and closed as another sign of proper  latching on.   Urine output and stool frequency  You can also tell how much milk your baby is getting by keeping track of your baby s diapers. By the end of the first week of life:    Your baby should have about 1 wet diaper on day 1, and  2 wet diapers on day 2. This should increase each day by 1 more wet diaper, up to 6 wet diapers on day 6 and then about 6 wet diapers every day. The urine will be a pale yellow color, not dark yellow or orange. Wet diapers should stay around this amount as your  baby gets older.    Your baby should have 3 or 4 stools per day. It is not uncommon for a  baby to pass stool after each feeding. In the second to fourth week of life, the number of  stools can increase to about 5 per day. After 1 month, the number of stools usually lessens. It might be 1 or 2 a day. Some babies may have a day or days with no stool. In these cases, stool should be in larger amounts when it is passed.     Weight  It s normal for your baby to lose some weight during the first 3 to 4 days of life. A baby might lose up to 7% of his or her birth weight during this time. Then your baby should start gaining again. By the end of the second week, he or she will back to birth weight.  Does my baby need vitamins?  All  infants should get vitamin D supplements. Your baby s healthcare provider will prescribe them. This may be at least 400 international units (IU) a day. Your baby usually will not need any other supplements. When your baby is 6 months old, you may start to offer baby foods that contain iron.  What should I do if my breasts become swollen, tender, or sore?  These symptoms are most often because your breasts are too full of milk (engorged). You are making more milk than your baby is drinking. This may cause pain and make it harder for your baby to nurse. If this happens, try the following:    Keep nursing. This is a temporary condition. It gets better once you can get your baby to drink more milk. Be sure to breastfeed more often and let your baby feed until he or she is finished.    Express some milk before you breastfeed. Do this manually or with a breast pump. This will soften the darker area around the nipple (areola) so your baby can latch deeper to begin feeding.    Use a warm compress. This can be a towel or paper diaper soaked in warm water. Or take a warm shower before feeding. Some women have found that switching off between a cold compress and a warm one gives them relief. If you feel comfortable with this, you can try it too. If you use an ice pack, wrap it in a thin towel to protect your skin.    Take acetaminophen for continued pain. The medicine is safe  to take every now and then during breastfeeding.  If your nipples or breasts continue to hurt, call your healthcare provider. Nipple and breast problems need to be looked at and treated as soon as possible. But don t get discouraged and stop nursing.    When to seek medical advice  Unless your baby s healthcare provider advises otherwise, call the provider right away if your baby has any of the following:    Fever (see Fever and children, below)    Repeated vomiting    Does not appear to be alert, refuses to nurse, or is sleeping too much, such as through feedings    Has signs of dehydration. These include fewer wet diapers than normal or no urine for 8 hours, or the urine appears dark. Or your baby has no tears when crying,  sunken eyes,  or dry mouth.    Is not gaining weight or losing weight  Call your own healthcare provider right away if you:    Have a fever of 100.4 F (38 C) or higher, or as directed by your provider    Have redness, warmth, pain, or unusual discharge from your breasts    Have such painful nipples and breasts that you want to stop nursing    Have a hard lump in your breast    Have lower belly (abdominal) pain or cramping when you are not breastfeeding    Have pain or burning when you pass urine    Have unexpected vaginal bleeding or foul-smelling discharge  Fever and children  Always use a digital thermometer to check your child s temperature. Never use a mercury thermometer.  For infants and toddlers, be sure to use a rectal thermometer correctly. A rectal thermometer may accidentally poke a hole in (perforate) the rectum. It may also pass on germs from the stool. Always follow the product maker s directions for proper use. If you don t feel comfortable taking a rectal temperature, use another method. When you talk to your child s healthcare provider, tell him or her which method you used to take your child s temperature.  Here are guidelines for fever temperature. Ear temperatures aren t  accurate before 6 months of age. Don t take an oral temperature until your child is at least 4 years old.  Infant under 3 months old:    Ask your child s healthcare provider how you should take the temperature.    Rectal or forehead (temporal artery) temperature of 100.4 F (38 C) or higher, or as directed by the provider    Armpit temperature of 99 F (37.2 C) or higher, or as directed by the provider   Date Last Reviewed: 3/1/2017    3090-2713 [employer]. [address] [place] [address]. All rights reserved. This information is not intended as a substitute for professional medical care. Always follow your healthcare professional's instructions.           Patient Education     Mastitis  Mastitis occurs when breast tissue becomes swollen and inflamed. This is almost always due to infection. Mastitis most often affects breastfeeding women during the first 6 weeks after childbirth. For this reason, it s also known as lactation mastitis. Infection may happen after a duct becomes clogged, causing milk to back up in the breast. Mastitis may also occur if bacteria enter the breast through small cracks in the nipple. (Less often, mastitis occurs in women who aren t breastfeeding. If you have mastitis that is not due to breastfeeding, your healthcare provider will give you more information as needed. Treatment may include some of the same home care measures listed below.)  Mastitis may cause flu-like symptoms such as fever, aches, and fatigue. The affected breast may feel painful, warm, tender, firm, or swollen. The skin over the breast may be red (often in a wedge-shaped pattern). You may feel a burning a sensation when breastfeeding.  In most cases, mastitis can be treated with antibiotics. This should clear the infection. If treatment is delayed, a pocket of pus (abscess) can form in the breast tissue. A procedure may then be needed to drain the pus. In severe cases of infection, other treatments  may be needed.  Home care  Breastfeeding    It s very important to keep the milk flowing from the infected breast. Continue breastfeeding from both breasts as usual. This will not hurt the baby. If this is too painful, use a breast pump to remove milk from the infected side. This can be fed to your baby or discarded. Note: If you don't continue to breastfeed or pump your breast, bacteria can grow in the milk that is left in your breast. This can make your infection worse.    Tell your healthcare provider if you have problems with breastfeeding. He or she may suggest changes to your technique, if needed. You may also be referred to a lactation nurse or consultant for support with breastfeeding.  General care    Take any medicines you re prescribed as directed. If you re taking antibiotics, be sure to complete all of the medicine even if you start to feel better. Over-the-counter pain medicines may also be recommended. Don t use breast creams or other products or medicines without talking to your healthcare provider first. Note: If you re concerned about taking medicines while breastfeeding, talk to your healthcare provider.    Rest as often as needed. Also be sure to drink plenty of fluids.    To help relieve pain and swelling, heat or ice may be used. Apply as often as directed by your provider.  ? Heat: Place a warm compress on the breast. Use a towel soaked in hot water, a heating pad, or a hot water bottle.  ? Cold: Place a cold compress on the breast. Use an ice pack or bag of ice wrapped in a thin towel. Never place a cold source directly on the skin.  Follow-up care  Follow up with your healthcare provider as advised.  When to seek medical advice  Call your healthcare provider right away if any of these occur:    Fever of 100.4 F (38 C) or higher, or as directed by your provider    Shaking chills    Worsening symptoms or symptoms that don't improve within 48 to 72 hours of starting treatment    New symptoms  develop  Date Last Reviewed: 6/1/2018 2000-2018 [employer]. [address] [place] [address], [place] [address]. All rights reserved. This information is not intended as a substitute for professional medical care. Always follow your healthcare professional's instructions.            0

## 2021-04-02 ENCOUNTER — HOSPITAL ENCOUNTER (EMERGENCY)
Facility: HOSPITAL | Age: 31
Discharge: HOME | End: 2021-04-02
Attending: NURSE PRACTITIONER | Admitting: NURSE PRACTITIONER
Payer: COMMERCIAL

## 2021-04-02 VITALS
DIASTOLIC BLOOD PRESSURE: 80 MMHG | SYSTOLIC BLOOD PRESSURE: 116 MMHG | RESPIRATION RATE: 16 BRPM | TEMPERATURE: 98.5 F | OXYGEN SATURATION: 97 %

## 2021-04-02 DIAGNOSIS — J02.9 VIRAL PHARYNGITIS: ICD-10-CM

## 2021-04-02 LAB
SPECIMEN SOURCE: NORMAL
STREP GROUP A PCR: NOT DETECTED

## 2021-04-02 PROCEDURE — 87651 STREP A DNA AMP PROBE: CPT | Performed by: NURSE PRACTITIONER

## 2021-04-02 PROCEDURE — 99213 OFFICE O/P EST LOW 20 MIN: CPT | Performed by: NURSE PRACTITIONER

## 2021-04-02 PROCEDURE — G0463 HOSPITAL OUTPT CLINIC VISIT: HCPCS

## 2021-04-02 ASSESSMENT — ENCOUNTER SYMPTOMS
COUGH: 0
SHORTNESS OF BREATH: 0
MYALGIAS: 0
RHINORRHEA: 0
TROUBLE SWALLOWING: 1
EYES NEGATIVE: 1
LIGHT-HEADEDNESS: 0
ACTIVITY CHANGE: 1
SORE THROAT: 1
HEADACHES: 0
NAUSEA: 0
FEVER: 0
DIARRHEA: 0
VOMITING: 0
DIZZINESS: 0
CHILLS: 0

## 2021-04-02 NOTE — ED PROVIDER NOTES
History     Chief Complaint   Patient presents with     Pharyngitis     HPI  Tiffanie Alejo is a 30 year old female who presents with symptoms of sore throat with painful swallowing that started this morning. Recent contact with nephew who tested positive for strep. Has had tonsillectomy. Not receive Covid vaccine. No OTC medications have been taken. Non-smoker. Denies fevers, chills, nausea, vomiting, diarrhea, and shortness of breath.    Allergies:  Allergies   Allergen Reactions     Wheat Extract      Stomach pains, constipation and diarrhea       Problem List:    Patient Active Problem List    Diagnosis Date Noted     Well woman exam with routine gynecological exam 12/10/2013     Priority: Medium     Contraception 12/10/2013     Priority: Medium        Past Medical History:    Past Medical History:   Diagnosis Date     Gluten intolerance      Lactose Intolerance 2013     Ovarian cyst 2013     Spondylolisthesis at L5-S1 level      Urinary incontinence        Past Surgical History:    Past Surgical History:   Procedure Laterality Date     ADENOIDECTOMY        SECTION N/A 2019    Procedure:  SECTION;  Surgeon: Ankush Godinez MD;  Location: [facility]     COLONOSCOPY  2014     ESOPHAGOGASTRODUODENOSCOPY  2014     GI SURGERY  3/7/13    colonoscopy     LAPAROSCOPY DIAGNOSTIC (GENERAL)       TONSILLECTOMY  2010     Gilbert Teeth Extraction         Family History:    Family History   Problem Relation Age of Onset     Heart Disease Mother 50        SCAD/AMI 2nd to SCAD     Breast Cancer Mother 54     Family History Negative Father         interstitial lung disease.       Social History:  Marital Status:   [2]  Social History     Tobacco Use     Smoking status: Never Smoker     Smokeless tobacco: Never Used   Substance Use Topics     Alcohol use: Yes     Alcohol/week: 1.7 standard drinks     Types: 2 Cans of beer per week     Comment: social - weekly     Drug use: No         Medications:    cholestyramine (QUESTRAN) 4 g Packet  ibuprofen (ADVIL/MOTRIN) 800 MG tablet  norethindrone (MICRONOR) 0.35 MG tablet  norethindrone-ethinyl estradiol (CYCLAFEM 1/35) 1-35 MG-MCG tablet  Prenatal Vit-Nikole-Fe Fum-FA (VINATE M) 27-1 MG TABS          Review of Systems   Constitutional: Positive for activity change. Negative for chills and fever.   HENT: Positive for sore throat and trouble swallowing. Negative for ear pain and rhinorrhea.    Eyes: Negative.    Respiratory: Negative for cough and shortness of breath.    Gastrointestinal: Negative for diarrhea, nausea and vomiting.   Musculoskeletal: Negative for myalgias.   Skin: Negative.    Neurological: Negative for dizziness, light-headedness and headaches.       Physical Exam   BP: 116/80  Temp: 98.5  F (36.9  C)  Resp: 16  SpO2: 97 %      Physical Exam  Vitals signs and nursing note reviewed.   Constitutional:       General: She is not in acute distress.     Appearance: She is normal weight.   HENT:      Head: Normocephalic.      Right Ear: Tympanic membrane and ear canal normal.      Left Ear: Tympanic membrane and ear canal normal.      Ears:      Comments: moderate amount of clear fluid behind right tympanic membrane/s.       Nose: Mucosal edema present.      Right Turbinates: Enlarged.      Left Turbinates: Enlarged.      Right Sinus: No maxillary sinus tenderness or frontal sinus tenderness.      Left Sinus: No maxillary sinus tenderness or frontal sinus tenderness.      Mouth/Throat:      Lips: Pink.      Mouth: Mucous membranes are moist.      Pharynx: Uvula midline. Posterior oropharyngeal erythema (mild) present.   Eyes:      Conjunctiva/sclera: Conjunctivae normal.   Cardiovascular:      Rate and Rhythm: Regular rhythm.      Heart sounds: Normal heart sounds. No murmur.   Pulmonary:      Effort: Pulmonary effort is normal. No respiratory distress.      Breath sounds: Normal breath sounds. No wheezing.   Lymphadenopathy:      Cervical:  Cervical adenopathy (small to moderate) present.      Right cervical: Superficial cervical adenopathy present.      Left cervical: Superficial cervical adenopathy present.   Skin:     General: Skin is warm and dry.   Neurological:      Mental Status: She is alert and oriented to person, place, and time.   Psychiatric:         Behavior: Behavior normal.         ED Course        Procedures             Results for orders placed or performed during the hospital encounter of 04/02/21 (from the past 24 hour(s))   Group A Streptococcus PCR Throat Swab    Specimen: Throat   Result Value Ref Range    Specimen Description Throat     Strep Group A PCR Not Detected NDET^Not Detected       Medications - No data to display    Assessments & Plan (with Medical Decision Making)     I have reviewed the nursing notes.    I have reviewed the findings, diagnosis, plan and need for follow up with the patient.    (J02.9) Viral pharyngitis  Comment: 30 year old female who presents with symptoms of sore throat with painful swallowing that started this morning. Recent contact with nephew who tested positive for strep. Has had tonsillectomy. Not receive Covid vaccine. No OTC medications have been taken. Non-smoker. Denies fevers, chills, nausea, vomiting, diarrhea, and shortness of breath.    MDM: NHT. Lungs CTA  Small to moderate, bilateral, anterior cervical adenopathy.  Mild swelling of bilateral nasal turbinates.  Moderate amount of clear fluid behind right tympanic membrane/s.    Strep screen negative    Plan: education provided for viral URI.  Treat symptoms conservatively with acetaminophen and  ibuprofen (if applicable) for fevers, body aches, and headaches, guaifenesin and/or honey for cough. May use chest rubs for sore throat and congestion, hot and cold liquids may help decrease sore throat and help you feel better. Increase fluids. You may utilize pseudoephedrine for congestion. Return to be reevaluated by ER/UC or your primary  care provider if symptoms worsen, you develop breathing difficulties, or you do not improve in a reasonable time frame. It can take several days for a cough to resolve. It can take ten to fourteen days for upper respiratory symptoms to resolve.   These discharge instructions and medications were reviewed with her and understanding verbalized.    Discharge Medication List as of 4/2/2021 10:30 AM          Final diagnoses:   Viral pharyngitis       4/2/2021   [facility]       Terri Avila, CNP  04/02/21 1248

## 2021-04-02 NOTE — DISCHARGE INSTRUCTIONS
Increase oral intake, cool mist vaporizer as needed, rest, avoid sharing utensils, practice good hand washing techniques, cover mouth when you cough and sneeze. Over the counter medications such as ibuprofen and/or acetaminophen for fever and generalized aches and pains. Ibuprofen 400 to 800 mg (2 - 4 tabs of over the counter med) every six to eight hours as needed;not to exceed maximum amount of 3200 mg in 24 hours.Tylenol 650 to 1000 mg every four to six hours as needed (not to exceed more than 4000 mg in a 24 hour period). May use interchangeably. Robitussin (guaifenesin) for cough. Chest rubs such as Marin's or Mentholatum may help reduce sore throat symptoms.  Chloraseptic spray for sore throat or menthol lozenges may be helpful for sore throat. Be reevaluated if symptoms persist longer than 10 - 14 days or worsen and if there is no improvement in 72 hours or worsening of symptoms.  Increase fluids.      OTC decongestants (oral or topical).  Decongestants (oral or topical) cause vasoconstriction of the nasal mucosa.  We prefer oral pseudoephedrine to phenylephrine and other oral OTC nasal decongestants. Side effects of oral decongestants may include tachycardia, elevated diastolic blood pressure, and palpitations. Pseudoephedrine 30 to 60 mg every four to six hours as needed for congestion. (Maximum dose is 240 mg in 24 hours). Do not use longer than 72 hours.    Commonly used topical decongestants include oxymetazoline, xylometazoline, and phenylephrine. Side effects of topical decongestants include nosebleeds and drying of the nasal membranes. Topical decongestants should only be used for two to three days; longer use may result in rebound nasal congestion after discontinuation.    Fluids, herbs, and foods for sore throat relief -- Adjusting the temperature and texture of foods and beverages may provide local relief of sore throat pain. While data showing benefit are quite limited, these approaches are  intuitive. We typically advise these measures since they are likely to be safe with minimal adverse effect, and patients often describe relief of symptoms.  We suggest hydration with frozen (eg, ice or popsicles) or heated liquids (eg, teas, soups), rather than room temperature or refrigerated fluids in patient with significant sore throat pain. Very cold foods can have a numbing-like effect that temporarily reduces or alleviates the pain of swallowing. Ice cubes or frozen popsicles facilitate hydration; ice cream and frozen yogurt provide caloric intake.  Warm fluids and foods, including teas, soups, and soft non-irritating foods, may be better tolerated by patients with throat pain than irritating foods (eg, rough-textured or spicy foods) or fluids at room temperatures. Foods that coat the throat, including honey and hard candies, can facilitate intake of calories while temporarily relieving throat pain.

## 2021-04-02 NOTE — ED TRIAGE NOTES
Pt is here with c/o sore throat for a couple days   Denies any fever  Was exposed to family member positive for strep

## 2021-04-15 ENCOUNTER — MYC MEDICAL ADVICE (OUTPATIENT)
Dept: FAMILY MEDICINE | Facility: OTHER | Age: 31
End: 2021-04-15

## 2021-04-23 ENCOUNTER — MYC MEDICAL ADVICE (OUTPATIENT)
Dept: OBGYN | Facility: OTHER | Age: 31
End: 2021-04-23

## 2021-04-23 DIAGNOSIS — Z30.011 ENCOUNTER FOR INITIAL PRESCRIPTION OF CONTRACEPTIVE PILLS: ICD-10-CM

## 2021-04-23 RX ORDER — NORETHINDRONE AND ETHINYL ESTRADIOL 1 MG-35MCG
1 KIT ORAL DAILY
Qty: 84 TABLET | Refills: 3 | Status: SHIPPED | OUTPATIENT
Start: 2021-04-23 | End: 2022-08-11

## 2021-04-29 ENCOUNTER — OFFICE VISIT (OUTPATIENT)
Dept: OBGYN | Facility: OTHER | Age: 31
End: 2021-04-29
Attending: NURSE PRACTITIONER
Payer: COMMERCIAL

## 2021-04-29 VITALS
HEIGHT: 62 IN | WEIGHT: 122 LBS | SYSTOLIC BLOOD PRESSURE: 102 MMHG | BODY MASS INDEX: 22.45 KG/M2 | HEART RATE: 108 BPM | DIASTOLIC BLOOD PRESSURE: 68 MMHG

## 2021-04-29 DIAGNOSIS — Z01.419 WELL WOMAN EXAM WITH ROUTINE GYNECOLOGICAL EXAM: ICD-10-CM

## 2021-04-29 DIAGNOSIS — Z12.4 SCREENING FOR CERVICAL CANCER: Primary | ICD-10-CM

## 2021-04-29 PROCEDURE — 99395 PREV VISIT EST AGE 18-39: CPT | Performed by: NURSE PRACTITIONER

## 2021-04-29 PROCEDURE — 87624 HPV HI-RISK TYP POOLED RSLT: CPT | Performed by: NURSE PRACTITIONER

## 2021-04-29 PROCEDURE — G0123 SCREEN CERV/VAG THIN LAYER: HCPCS | Performed by: NURSE PRACTITIONER

## 2021-04-29 ASSESSMENT — MIFFLIN-ST. JEOR: SCORE: 1216.64

## 2021-04-29 ASSESSMENT — PATIENT HEALTH QUESTIONNAIRE - PHQ9: SUM OF ALL RESPONSES TO PHQ QUESTIONS 1-9: 1

## 2021-04-29 ASSESSMENT — PAIN SCALES - GENERAL: PAINLEVEL: NO PAIN (0)

## 2021-04-29 NOTE — PATIENT INSTRUCTIONS
Patient Education     Breast Health: Breast Self-Awareness  What is breast self-awareness?  Breast self-awareness is knowing how your breasts normally look and feel. Your breasts change as you go through different stages of your life. So it s important to learn what is normal for your breasts. Knowing about your breasts helps you spot any changes in them right away. Tell your healthcare provider about any changes.   Why is breast self-awareness important?   Many experts now say that women should focus on breast self-awareness instead of doing a breast self-examination (BSE). These experts include the American Cancer Society and the American Congress of Obstetricians and Gynecologists. Some experts even advise not teaching women to do a BSE. That s because research hasn t shown a clear benefit to doing BSEs.   Breast self-awareness is different than a BSE. It isn t about following a certain method and schedule. It s about knowing what's normal for your breasts. That way you can spot even small changes right away. If you see any changes, tell your healthcare provider.   Changes to look for  Call your healthcare provider if you find any changes in your breasts that worry you. These changes may be:     A lump    Nipple discharge other than breast milk, especially if it's bloody    Swelling    A change in size or shape    Skin changes, such as redness, thickening, or dimpling of the skin    Swollen lymph nodes in the armpit    Nipple problems, such as pain or redness  If you find a lump  Call your provider if you find lumpiness in one breast. Also call if you feel something different in the tissue or feel a definite lump. Sometimes lumpiness may be due to menstrual changes. But there may be reason for concern.   Your provider may want to see you right away if you have:     Nipple discharge that is bloody    Skin changes on your breast, such as dimpling or puckering  It s okay to be upset if you find a lump. Be sure to  call your provider right away. Remember that most breast lumps are benign. This means they are not cancer.   [employer] last reviewed this educational content on 5/1/2020 2000-2021 [employer]. All rights reserved. This information is not intended as a substitute for professional medical care. Always follow your healthcare professional's instructions.           Patient Education     The Range of Pap Test Results  When your Pap test is sent to the lab, the lab studies your cell samples and reports any abnormal cell changes. Your healthcare provider can discuss these changes with you. In some cases, an abnormal Pap test is due to an infection. More serious cell changes range from dysplasia to cancer. Talk to your healthcare provider about your Pap test.     Normal results  Cervical cells, even normal ones, are always changing. As they mature, normal squamous cells move from deeper layers within the cervix. Over time, these cells flatten and cover the surface of the cervix. Within the cervical canal, the cells are different. These glandular cells are taller and not as flat as the cells on the surface of the cervix. When a Pap test sample shows healthy cells of both types, the results are negative. Keep having Pap tests as often as directed.   Abnormal results  A positive Pap test result means some cells in the sample showed abnormal changes. These results are grouped by the type of cell change and the location, or extent, of the changes. Depending on the results, you may need further testing.     Inflammation. Noncancerous changes are present. They may be due to normal cell repair. Or they may be caused by an infection, such as HPV or yeast. Further testing may be needed. (Also called reactive cellular changes.)    Atypical squamous cells. Test results are unclear. Cells on the surface of the cervix show changes, but their significance is not yet known. Testing for HPV and other sexually transmitted  infections (STIs) may be needed. Treatment may be required. (Reported as ASC-US or ASC-H.)    Atypical glandular cells. Cells lining the cervical canal show abnormal changes. Further testing is likely. You may also have treatment to destroy or remove problem cells. (Reported as AGC.)    Mild dysplasia. Cells show distinct changes. More testing or HPV typing may be done. You may also have treatment to destroy or remove problem cells. (Reported as low-grade COLLIN or KATY 1.)    Moderate to severe dysplasia. Cells show precancerous changes. Or noninvasive cancer (carcinoma in situ) may be present. Treatment to destroy or remove problem cells is likely. (Reported as high-grade COLLIN or KATY 2 or KATY 3.)    Cancer. Different types of cancer may be detected by your Pap test. More tests to assess the cancer's extent are likely. The type of treatment will depend on the test results and other factors, such as age and health history. (Reported as squamous cell carcinoma, endocervical adenocarcinoma in situ, or adenocarcinoma.)  [employer] last reviewed this educational content on 6/1/2020 2000-2021 [employer]. All rights reserved. This information is not intended as a substitute for professional medical care. Always follow your healthcare professional's instructions.           Patient Education     When You Have an Abnormal Pap Test    The Pap test is a screening test that checks for cell changes in the cervix. The cervix is the opening of the uterus. In some cases, it checks for a virus that can cause cervical cancer. If your Pap results were abnormal, you may be worried. But there is no reason to panic. An abnormal Pap test result can mean many things. It may be due to changes (inflammation) caused by normal cell repair or infection. Or you may have a problem called dysplasia that could become cervical cancer. If so, know that dysplasia tends to grow very slowly before becoming cervical cancer. That s why it s so  important to have Pap tests as often as directed. Pap tests can show cell changes in the cervix early. This is when treatment is most effective.  Talk with your healthcare provider  Talk about your results with your healthcare provider. Find out about any follow-up tests you ll need. You may be asked to come back for a second Pap test in a year. This gives your cervix time to repair itself. Or you may be scheduled for a colposcopy exam. This lets your healthcare provider get a closer look at your cervix. He or she may take a biopsy of your cervix. This a small sample of tissue that is sent to a lab for more testing. In either case, keep your follow-up visits. They are one of your best safeguards against future problems.  Understanding your risk  Some lifestyle choices can raise your risk of abnormal cell changes. Did you start having sex at a young age? Have you had many sex partners? Have you had sex without using a latex condom? Do you smoke? Do you have an immune deficiency such as HIV? If you answered yes to any of these questions, you are more at risk. One of the most common reasons for an abnormal Pap result is infection with the human papillomavirus (HPV). If your Pap results suggest you may have HPV, you may need more tests.  The Pap test  During the test:    A tool called a speculum is put into the vagina to hold it open. This lets your healthcare provider see the cervix.    A small brush, spatula, or swab is used to take cells from several areas of the cervix. The cells are put into a liquid or on a slide. They are then sent to a lab where they are checked for changes. Your healthcare provider will contact you with the results.  [employer] last reviewed this educational content on 6/1/2020 2000-2021 [employer]. All rights reserved. This information is not intended as a substitute for professional medical care. Always follow your healthcare professional's instructions.

## 2021-04-29 NOTE — NURSING NOTE
"Chief Complaint   Patient presents with     Gyn Exam       Initial /68   Pulse 108   Ht 1.575 m (5' 2\")   Wt 55.3 kg (122 lb)   BMI 22.31 kg/m   Estimated body mass index is 22.31 kg/m  as calculated from the following:    Height as of this encounter: 1.575 m (5' 2\").    Weight as of this encounter: 55.3 kg (122 lb).  Medication Reconciliation: complete  Jennifer Elliott LPN    "

## 2021-04-29 NOTE — PROGRESS NOTES
CC:  Annual exam  HPI:  Tiffanie Alejo is a 30 year old female P1.  LMP 21. Periods are regular with bcp.   No other c/o.      Past GYN history:  No STD history and HPV  STI testing offered?  Declined       Last PAP smear:  Normal and other HR-HPV+      Past Medical History:   Diagnosis Date     Gluten intolerance      Lactose Intolerance 2013     Ovarian cyst 2013     Spondylolisthesis at L5-S1 level     grade 1     Urinary incontinence     urge       Past Surgical History:   Procedure Laterality Date     ADENOIDECTOMY        SECTION N/A 2019    Procedure:  SECTION;  Surgeon: Ankush Godinez MD;  Location: [facility]     COLONOSCOPY  2014     ESOPHAGOGASTRODUODENOSCOPY  2014     GI SURGERY  3/7/13    colonoscopy     LAPAROSCOPY DIAGNOSTIC (GENERAL)       TONSILLECTOMY       [place] Teeth Extraction         Family History   Problem Relation Age of Onset     Heart Disease Mother 50        SCAD/AMI 2nd to SCAD     Breast Cancer Mother 54     Family History Negative Father         interstitial lung disease.       Current Outpatient Medications   Medication Sig Dispense Refill     cholestyramine (QUESTRAN) 4 g Packet Take 1 packet by mouth as needed        ibuprofen (ADVIL/MOTRIN) 800 MG tablet Take 1 tablet (800 mg) by mouth every 8 hours as needed for pain Take with a full meal for primary pain control 50 tablet 1     norethindrone-ethinyl estradiol (CYCLAFEM 1/35) 1-35 MG-MCG tablet Take 1 tablet by mouth daily 84 tablet 3       Allergies: Wheat extract    ROS:  CONSTITUTIONAL:NEGATIVE for fever, chills, change in weight  RESP:NEGATIVE for significant cough or SOB  BREAST: NEGATIVE for masses, tenderness or discharge  CV: NEGATIVE for chest pain, palpitations or peripheral edema  GI: NEGATIVE for nausea, abdominal pain, heartburn, or change in bowel habits  : normal menstrual cycles  ENDOCRINE: NEGATIVE for temperature intolerance, skin/hair changes  PSYCHIATRIC:  "NEGATIVE for changes in mood or affect    EXAM:  Blood pressure 102/68, pulse 108, height 1.575 m (5' 2\"), weight 55.3 kg (122 lb), unknown if currently breastfeeding.   BMI= Body mass index is 22.31 kg/m .  General - pleasant female in no acute distress.  Neck - supple without lymphadenopathy or thyromegaly.  Lungs - clear to auscultation bilaterally.  Heart - regular rate and rhythm without murmur.  Breast - no nodularity, asymmetry or nipple discharge bilaterally.  Abdomen - soft, nontender, nondistended, no hepatosplenomegaly.  Pelvic - EG: normal adult female, BUS: within normal limits, Vagina: well rugated, no discharge, Cervix: no lesions or CMT, Uterus: firm, normal sized and nontender, Adnexae: no masses or tenderness.  Rectovaginal - deferred.  Musculoskeletal - no gross deformities.  Neurological - normal strength, sensation, and mental status.      ASSESSMENT/PLAN:  (Z12.4) Screening for cervical cancer  (primary encounter diagnosis)  Comment:   Plan: A pap thin layer diagnostic with HPV (select         HPV order below), HPV High Risk Types DNA         Cervical            (Z01.419) Well woman exam with routine gynecological exam  Comment:   Plan: return annually and as needed.       Discussed exercise and healthy eating, including calcium intake.  She should return to the clinic in one year, or sooner if problems arise.    "

## 2021-05-05 LAB
COPATH REPORT: ABNORMAL
PAP: ABNORMAL

## 2021-05-06 LAB
FINAL DIAGNOSIS: NORMAL
HPV HR 12 DNA CVX QL NAA+PROBE: NEGATIVE
HPV16 DNA SPEC QL NAA+PROBE: NEGATIVE
HPV18 DNA SPEC QL NAA+PROBE: NEGATIVE
SPECIMEN DESCRIPTION: NORMAL
SPECIMEN SOURCE CVX/VAG CYTO: NORMAL

## 2021-06-13 ENCOUNTER — MYC MEDICAL ADVICE (OUTPATIENT)
Dept: FAMILY MEDICINE | Facility: OTHER | Age: 31
End: 2021-06-13

## 2021-09-12 ENCOUNTER — HEALTH MAINTENANCE LETTER (OUTPATIENT)
Age: 31
End: 2021-09-12

## 2021-10-22 ENCOUNTER — MYC MEDICAL ADVICE (OUTPATIENT)
Dept: FAMILY MEDICINE | Facility: OTHER | Age: 31
End: 2021-10-22

## 2021-10-22 NOTE — TELEPHONE ENCOUNTER
I can give her bhp list but otherwise we should do tele visit     Psychologists/ Counselors      [place]   [phone]  [facility]   [phone]  [facility] [phone]  [facility] (kids) [phone]  [facility](teens) [phone]  [facility]  [phone]  [facility]  [phone]  [facility]  [phone]  [facility]  [phone]  [facility] counseling [phone]   [facility]   [phone]   [facility]    [phone]   [facility]   [phone]   (Laura Ansari)   [facility]                      [phone]           [facility]  [phone]  [facility] [phone]  [facility]  [phone]  [facility]  [phone]     [facility] [phone]      [facility] [phone]  [facility] counseling [phone]  [facility]   [phone]  [facility] (Clifford Smith, LMFT)                                                   [phone]  [facility]nam[facility] Lew  [phone]  [facility] [phone]  [facility]      [phone]    [employer] [facility]  [phone]  [facility]      [phone]     [place]  Navneet Blank   [phone]  [facility]      [phone]  [facility] Dr. TIMOTHY Borrero [phone]  [facility]      [phone]  [facility]  [phone]    [facility]                      [phone]    *Facilities in bold italics indicate medication management  Services are offered.    Crisis support  Mobile crisis line- [phone]  [facility] Range: Free online resources including therapy for Mental Health and substance use problems: Http://thriverange.org    Crisis Text Line  Text HOME to 917-492 - The Crisis Text Line serves anyone, in any type of  crisis, providing access to free, 24/7 support and information via the medium people already use and trust  http://www.crisistextline.org   Here's how it works:  Text HOME to 733-555 from anywhere in the [place], anytime, about any type of crisis.  A live, trained Crisis Counselor receives the text and responds quickly.  The volunteer Crisis Counselor will help you move from a 'hot moment to a cool moment'

## 2021-10-24 ENCOUNTER — HOSPITAL ENCOUNTER (EMERGENCY)
Facility: HOSPITAL | Age: 31
Discharge: HOME | End: 2021-10-24
Attending: NURSE PRACTITIONER | Admitting: NURSE PRACTITIONER
Payer: COMMERCIAL

## 2021-10-24 VITALS
OXYGEN SATURATION: 99 % | DIASTOLIC BLOOD PRESSURE: 78 MMHG | HEART RATE: 62 BPM | SYSTOLIC BLOOD PRESSURE: 112 MMHG | RESPIRATION RATE: 18 BRPM | TEMPERATURE: 98.2 F

## 2021-10-24 DIAGNOSIS — T78.40XA ALLERGIC REACTION, INITIAL ENCOUNTER: ICD-10-CM

## 2021-10-24 PROCEDURE — 250N000009 HC RX 250: Performed by: NURSE PRACTITIONER

## 2021-10-24 PROCEDURE — 250N000013 HC RX MED GY IP 250 OP 250 PS 637: Performed by: NURSE PRACTITIONER

## 2021-10-24 PROCEDURE — G0463 HOSPITAL OUTPT CLINIC VISIT: HCPCS

## 2021-10-24 PROCEDURE — 99213 OFFICE O/P EST LOW 20 MIN: CPT | Performed by: NURSE PRACTITIONER

## 2021-10-24 RX ORDER — EPINEPHRINE 0.3 MG/.3ML
0.3 INJECTION SUBCUTANEOUS
Qty: 1 EACH | Refills: 0 | Status: ON HOLD | OUTPATIENT
Start: 2021-10-24 | End: 2023-02-11

## 2021-10-24 RX ORDER — FAMOTIDINE 20 MG/1
40 TABLET, FILM COATED ORAL 2 TIMES DAILY
Status: DISCONTINUED | OUTPATIENT
Start: 2021-10-24 | End: 2021-10-24 | Stop reason: HOSPADM

## 2021-10-24 RX ORDER — DEXAMETHASONE SODIUM PHOSPHATE 10 MG/ML
10 INJECTION, SOLUTION INTRAMUSCULAR; INTRAVENOUS ONCE
Status: COMPLETED | OUTPATIENT
Start: 2021-10-24 | End: 2021-10-24

## 2021-10-24 RX ADMIN — FAMOTIDINE 40 MG: 20 TABLET, FILM COATED ORAL at 09:22

## 2021-10-24 RX ADMIN — DEXAMETHASONE SODIUM PHOSPHATE 10 MG: 10 INJECTION, SOLUTION INTRAMUSCULAR; INTRAVENOUS at 09:35

## 2021-10-24 ASSESSMENT — ENCOUNTER SYMPTOMS
FEVER: 0
DIZZINESS: 0
CHILLS: 0
HEADACHES: 0
ACTIVITY CHANGE: 1
EYE REDNESS: 1
LIGHT-HEADEDNESS: 0
FACIAL SWELLING: 1
DIARRHEA: 0
NAUSEA: 0
VOMITING: 0
SHORTNESS OF BREATH: 1
EYE DISCHARGE: 1
EYE ITCHING: 1

## 2021-10-24 NOTE — ED TRIAGE NOTES
Pt presents with c/o bilateral eye swelling (right side being the worse) and right side eye drainage. States that her eyes were also really red but she used eye drops before coming here. Reports she had an allergic reaction to a cat. She was around a cat yesterday morning and sx started shortly after. Also went to another friends house last night that owns a cat. Pt took benadryl last night and some dayquil this morning. Pt had difficulty breathing but states that the benadryl helped and is no longer having a hard time breathing.

## 2021-10-24 NOTE — ED PROVIDER NOTES
History     Chief Complaint   Patient presents with     Facial Swelling     HPI  Tiffanie Alejo is a 31 year old female who presents with symptoms of facial swelling, watery and red eyes accompanied with itching.  Had shortness of breath yesterday that has resolved at this time.  She feels her reaction is related to being around cats yesterday.  She did take Benadryl yesterday and DayQuil this morning that has helped decrease her symptoms.  Denies fevers, chills, nausea, vomiting, diarrhea, and headaches.    Allergies:  Allergies   Allergen Reactions     Cats      Facial swelling     Wheat Extract      Stomach pains, constipation and diarrhea       Problem List:    Patient Active Problem List    Diagnosis Date Noted     Well woman exam with routine gynecological exam 12/10/2013     Priority: Medium     Contraception 12/10/2013     Priority: Medium        Past Medical History:    Past Medical History:   Diagnosis Date     Gluten intolerance      Lactose Intolerance 2013     Ovarian cyst 2013     Spondylolisthesis at L5-S1 level      Urinary incontinence        Past Surgical History:    Past Surgical History:   Procedure Laterality Date     ADENOIDECTOMY  2010      SECTION N/A 2019    Procedure:  SECTION;  Surgeon: Ankush Godinez MD;  Location: [facility]     COLONOSCOPY  2014     ESOPHAGOGASTRODUODENOSCOPY  2014     GI SURGERY  3/7/13    colonoscopy     LAPAROSCOPY DIAGNOSTIC (GENERAL)       TONSILLECTOMY  2010     [place] Teeth Extraction         Family History:    Family History   Problem Relation Age of Onset     Heart Disease Mother 50        SCAD/AMI 2nd to SCAD     Breast Cancer Mother 54     Family History Negative Father         interstitial lung disease.       Social History:  Marital Status:   [2]  Social History     Tobacco Use     Smoking status: Never Smoker     Smokeless tobacco: Never Used   Substance Use Topics     Alcohol use: Yes     Alcohol/week: 1.7  standard drinks     Types: 2 Cans of beer per week     Comment: social - weekly     Drug use: No        Medications:    EPINEPHrine (ANY BX GENERIC EQUIV) 0.3 MG/0.3ML injection 2-pack  cholestyramine (QUESTRAN) 4 g Packet  ibuprofen (ADVIL/MOTRIN) 800 MG tablet  norethindrone-ethinyl estradiol (CYCLAFEM 1/35) 1-35 MG-MCG tablet          Review of Systems   Constitutional: Positive for activity change. Negative for chills and fever.   HENT: Positive for facial swelling.    Eyes: Positive for discharge, redness and itching.   Respiratory: Positive for shortness of breath (resolved at this time).    Gastrointestinal: Negative for diarrhea, nausea and vomiting.   Skin: Negative.    Neurological: Negative for dizziness, light-headedness and headaches.       Physical Exam   BP: 112/78  Pulse: 62  Temp: 98.2  F (36.8  C)  Resp: 18  SpO2: 99 %      Physical Exam  Vitals and nursing note reviewed.   Constitutional:       General: She is in acute distress (Mild to moderate).      Appearance: She is normal weight.   HENT:      Head: Normocephalic.      Jaw: There is normal jaw occlusion.        Mouth/Throat:      Mouth: Mucous membranes are moist.   Eyes:      Conjunctiva/sclera: Conjunctivae normal.   Cardiovascular:      Rate and Rhythm: Normal rate and regular rhythm.      Heart sounds: Normal heart sounds. No murmur heard.     Pulmonary:      Effort: Pulmonary effort is normal. No respiratory distress.      Breath sounds: Normal breath sounds. No stridor. No wheezing, rhonchi or rales.   Skin:     General: Skin is warm and dry.      Capillary Refill: Capillary refill takes less than 2 seconds.   Neurological:      Mental Status: She is alert and oriented to person, place, and time.   Psychiatric:         Behavior: Behavior normal.         ED Course        Procedures             No results found for this or any previous visit (from the past 24 hour(s)).    Medications   famotidine (PEPCID) tablet 40 mg (40 mg Oral Given  10/24/21 0922)   dexamethasone (DECADRON) PF oral solution (inj used orally) 10 mg (10 mg Oral Given 10/24/21 0935)       Assessments & Plan (with Medical Decision Making)     I have reviewed the nursing notes.    I have reviewed the findings, diagnosis, plan and need for follow up with the patient.  (T78.40XA) Allergic reaction, initial encounter  Comment: 31 year old female who presents with symptoms of facial swelling, watery and red eyes accompanied with itching.  Had shortness of breath yesterday that has resolved at this time.  She feels her reaction is related to being around cats yesterday.  She did take Benadryl yesterday and DayQuil this morning that has helped decrease her symptoms.  Denies fevers, chills, nausea, vomiting, diarrhea, and headaches.    MDM:NHT. Lungs CTA  Has mild to moderate right sided facial swelling.  Cheeks are flushed.    Dexamethasone 10 mg and famotidine 40 mg given orally    Plan: Education provided on EpiPen and anaphylaxis reactions.  Will repeat Benadryl at home.  Repeat famotidine 40 mg (Pepcid) in the morning  Repeat benadryl 25 to 50 mg (diphenhydramine) every 4 to 6 hours as needed (maximum dose in 24 hours is 400 mg)  Return to ER for worsening of symptoms, difficulty breathing, swelling around lips, facial swelling, or fevers.  Education provided for severe allergic reactions  These discharge instructions and medications were reviewed with her and understanding verbalized.    This document was prepared using a combination of typing and voice generated software.  While every attempt was made for accuracy, spelling and grammatical errors may exist.    Discharge Medication List as of 10/24/2021  9:30 AM      START taking these medications    Details   EPINEPHrine (ANY BX GENERIC EQUIV) 0.3 MG/0.3ML injection 2-pack Inject 0.3 mLs (0.3 mg) into the muscle once as needed for anaphylaxis, Disp-1 each, R-0, E-Prescribe             Final diagnoses:   Allergic reaction, initial  encounter       10/24/2021   [facility]       Terri Avila, CNP  10/24/21 1053

## 2021-10-24 NOTE — DISCHARGE INSTRUCTIONS
Repeat famotidine 40 mg (Pepcid) in the morning  Repeat benadryl 25 to 50 mg (diphenhydramine) every 4 to 6 hours as needed (maximum dose in 24 hours is 400 mg)  Return to ER for worsening of symptoms, difficulty breathing, swelling around lips, facial swelling, or fevers.  Education provided for severe allergic reactions

## 2021-10-24 NOTE — ED TRIAGE NOTES
Pt in for evaluation of periorbital swelling and drainage. Reports she has an allergy to cats, was exposed to one and may have been rubbing her eyes and is concerned.

## 2021-10-25 NOTE — TELEPHONE ENCOUNTER
It really depends on what her insurance covers -- recommend checking with them first.  Looks like she wants to schedule video visit

## 2021-10-28 ENCOUNTER — VIRTUAL VISIT (OUTPATIENT)
Dept: FAMILY MEDICINE | Facility: OTHER | Age: 31
End: 2021-10-28
Attending: FAMILY MEDICINE
Payer: COMMERCIAL

## 2021-10-28 DIAGNOSIS — T78.40XD SEVERE ALLERGIC REACTION, SUBSEQUENT ENCOUNTER: Primary | ICD-10-CM

## 2021-10-28 DIAGNOSIS — U07.1 INFECTION DUE TO 2019 NOVEL CORONAVIRUS: ICD-10-CM

## 2021-10-28 DIAGNOSIS — K13.70 ORAL MUCOSAL LESION: ICD-10-CM

## 2021-10-28 DIAGNOSIS — Z63.9 RELATIONSHIP DYSFUNCTION: ICD-10-CM

## 2021-10-28 PROCEDURE — 99214 OFFICE O/P EST MOD 30 MIN: CPT | Mod: 95 | Performed by: FAMILY MEDICINE

## 2021-10-28 SDOH — SOCIAL STABILITY - SOCIAL INSECURITY: PROBLEM RELATED TO PRIMARY SUPPORT GROUP, UNSPECIFIED: Z63.9
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
## 2021-10-28 NOTE — NURSING NOTE
"Chief Complaint   Patient presents with     Hospital F/U       Initial There were no vitals taken for this visit. Estimated body mass index is 22.31 kg/m  as calculated from the following:    Height as of 4/29/21: 1.575 m (5' 2\").    Weight as of 4/29/21: 55.3 kg (122 lb).  Medication Reconciliation: complete  Radha Church LPN  "

## 2021-11-02 ENCOUNTER — MYC MEDICAL ADVICE (OUTPATIENT)
Dept: FAMILY MEDICINE | Facility: OTHER | Age: 31
End: 2021-11-02

## 2021-11-02 DIAGNOSIS — K13.70 ORAL MUCOSAL LESION: Primary | ICD-10-CM

## 2021-11-03 RX ORDER — TRIAMCINOLONE ACETONIDE 0.1 %
PASTE (GRAM) DENTAL 2 TIMES DAILY
Qty: 5 G | Refills: 3 | Status: SHIPPED | OUTPATIENT
Start: 2021-11-03 | End: 2023-01-26

## 2021-11-10 ENCOUNTER — OFFICE VISIT (OUTPATIENT)
Dept: OTOLARYNGOLOGY | Facility: OTHER | Age: 31
End: 2021-11-10
Attending: FAMILY MEDICINE
Payer: COMMERCIAL

## 2021-11-10 VITALS
DIASTOLIC BLOOD PRESSURE: 70 MMHG | HEIGHT: 62 IN | OXYGEN SATURATION: 98 % | HEART RATE: 81 BPM | TEMPERATURE: 98.5 F | BODY MASS INDEX: 22.08 KG/M2 | SYSTOLIC BLOOD PRESSURE: 108 MMHG | WEIGHT: 120 LBS

## 2021-11-10 DIAGNOSIS — J30.2 SEASONAL ALLERGIC RHINITIS, UNSPECIFIED TRIGGER: ICD-10-CM

## 2021-11-10 DIAGNOSIS — J30.81 ALLERGIC TO CATS: Primary | ICD-10-CM

## 2021-11-10 DIAGNOSIS — T78.40XD SEVERE ALLERGIC REACTION, SUBSEQUENT ENCOUNTER: ICD-10-CM

## 2021-11-10 PROCEDURE — 99213 OFFICE O/P EST LOW 20 MIN: CPT | Performed by: NURSE PRACTITIONER

## 2021-11-10 ASSESSMENT — PAIN SCALES - GENERAL: PAINLEVEL: NO PAIN (0)

## 2021-11-10 ASSESSMENT — MIFFLIN-ST. JEOR: SCORE: 1202.57

## 2021-11-10 NOTE — NURSING NOTE
"Chief Complaint   Patient presents with     Allergies     Severe reaction       Initial /70 (Cuff Size: Adult Regular)   Pulse 81   Temp 98.5  F (36.9  C) (Tympanic)   Ht 1.575 m (5' 2\")   Wt 54.4 kg (120 lb)   SpO2 98%   BMI 21.95 kg/m   Estimated body mass index is 21.95 kg/m  as calculated from the following:    Height as of this encounter: 1.575 m (5' 2\").    Weight as of this encounter: 54.4 kg (120 lb).  Medication Reconciliation: complete  Laura Garcia LPN    "
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
## 2021-11-10 NOTE — PATIENT INSTRUCTIONS
Thank you for allowing Cece Walsh and our ENT team to participate in your care.  If your medications are too expensive, please give the nurse a call.  We can possibly change this medication.  If you have a scheduling or an appointment question please contact our Health Unit Coordinator at their direct line [phone].   ALL nursing questions or concerns can be directed to your ENT nurse at: [phone] - [place]     Allergy testing ordered, allergy nurse will call you to schedule   Indications for allergy testing include:   1) Confirm suspicion of allergic rhinitis due to inhalant allergies  2) Identify the offending allergen to determine specific mode of treatment  3) In the case of chronic rhinosinusitis: when symptoms are not controlled by avoidance and pharmacotherapy  4) In the Asthma patient when exacerbations may be due to perennial allergen exposure  5) Suspect food allergy  6) Otitis Media, chronic rhinitis, atopic dermatitis, Meniere disease, headache, pharyngitis or eye symptoms    Modified quantitative testing (MQT) will be performed.  Signed consent was obtained, and the risks of immunotherapy were discussed, including the potential for anaphylaxis.    If immunotherapy (IT) is recommended, there is continued risk of anaphylaxis.   Anaphylaxis can cause death. The patient will need to be monitored for 30 minutes post injection.  They must present their epinephrine pen prior to injection.  Subcutaneous as well as sublingual immunotherapy (SLIT) were discussed as potential treatment options.  The patient was told SLIT is not approved by the FDA and is cash pay.  The general time frame of immunotherapy was discussed (generally 3-5 years, sometimes longer), and the basic immunology behind IT was discussed.    Keep Epi- Pen on you at all times  Keep Benadryl with you     Take OTC antihistamine prior to known exposure to cats.

## 2021-11-11 ENCOUNTER — TELEPHONE (OUTPATIENT)
Dept: ALLERGY | Facility: OTHER | Age: 31
End: 2021-11-11
Payer: COMMERCIAL

## 2021-11-11 NOTE — TELEPHONE ENCOUNTER
Went over instructions with patient for allergy skin testing.  Reviewed patients current medications and patient will avoid all contraindicated medications prior to MQT testing.  Patient verbalizes understanding.  Copy of allergy testing packet will be mailed to the patient.  Patient is aware that she will be called to schedule her testing.  She is advised to call if she has any questions.    Durga Guzman RN on 11/11/2021 at 10:23 AM

## 2021-11-18 ENCOUNTER — MYC MEDICAL ADVICE (OUTPATIENT)
Dept: FAMILY MEDICINE | Facility: OTHER | Age: 31
End: 2021-11-18
Payer: COMMERCIAL

## 2021-11-29 ENCOUNTER — LAB (OUTPATIENT)
Dept: LAB | Facility: OTHER | Age: 31
End: 2021-11-29
Payer: COMMERCIAL

## 2021-11-29 DIAGNOSIS — U07.1 INFECTION DUE TO 2019 NOVEL CORONAVIRUS: ICD-10-CM

## 2021-11-29 PROCEDURE — 36415 COLL VENOUS BLD VENIPUNCTURE: CPT

## 2021-11-29 PROCEDURE — 86769 SARS-COV-2 COVID-19 ANTIBODY: CPT | Mod: 90

## 2021-11-30 LAB
SARS-COV-2 AB SERPL IA-ACNC: 7.1 U/ML
SARS-COV-2 AB SERPL QL IA: POSITIVE

## 2022-02-14 ENCOUNTER — OFFICE VISIT (OUTPATIENT)
Dept: ALLERGY | Facility: OTHER | Age: 32
End: 2022-02-14
Attending: NURSE PRACTITIONER
Payer: COMMERCIAL

## 2022-02-14 ENCOUNTER — OFFICE VISIT (OUTPATIENT)
Dept: OTOLARYNGOLOGY | Facility: OTHER | Age: 32
End: 2022-02-14
Attending: NURSE PRACTITIONER
Payer: COMMERCIAL

## 2022-02-14 VITALS
HEIGHT: 62 IN | DIASTOLIC BLOOD PRESSURE: 75 MMHG | BODY MASS INDEX: 22.08 KG/M2 | OXYGEN SATURATION: 98 % | HEART RATE: 73 BPM | SYSTOLIC BLOOD PRESSURE: 108 MMHG | WEIGHT: 120 LBS

## 2022-02-14 DIAGNOSIS — J30.2 PERENNIAL ALLERGIC RHINITIS WITH SEASONAL VARIATION: Primary | ICD-10-CM

## 2022-02-14 DIAGNOSIS — J30.2 SEASONAL ALLERGIC RHINITIS, UNSPECIFIED TRIGGER: Primary | ICD-10-CM

## 2022-02-14 DIAGNOSIS — J30.81 CAT ALLERGIES: ICD-10-CM

## 2022-02-14 DIAGNOSIS — J30.89 PERENNIAL ALLERGIC RHINITIS WITH SEASONAL VARIATION: Primary | ICD-10-CM

## 2022-02-14 PROCEDURE — 95004 PERQ TESTS W/ALRGNC XTRCS: CPT

## 2022-02-14 PROCEDURE — 95024 IQ TESTS W/ALLERGENIC XTRCS: CPT

## 2022-02-14 PROCEDURE — 99212 OFFICE O/P EST SF 10 MIN: CPT | Mod: 25 | Performed by: NURSE PRACTITIONER

## 2022-02-14 RX ORDER — CETIRIZINE HYDROCHLORIDE 10 MG/1
10 TABLET ORAL DAILY
Qty: 30 TABLET | Refills: 1 | Status: ON HOLD | OUTPATIENT
Start: 2022-02-14 | End: 2023-02-11

## 2022-02-14 ASSESSMENT — PAIN SCALES - GENERAL: PAINLEVEL: NO PAIN (0)

## 2022-02-14 ASSESSMENT — MIFFLIN-ST. JEOR: SCORE: 1202.57

## 2022-02-14 NOTE — NURSING NOTE
"Chief Complaint   Patient presents with     Follow Up     MQT       Initial /75 (BP Location: Right arm, Patient Position: Sitting, Cuff Size: Adult Regular)   Pulse 73   Ht 1.575 m (5' 2\")   Wt 54.4 kg (120 lb)   SpO2 98%   BMI 21.95 kg/m   Estimated body mass index is 21.95 kg/m  as calculated from the following:    Height as of this encounter: 1.575 m (5' 2\").    Weight as of this encounter: 54.4 kg (120 lb).  Medication Reconciliation:     Durga Guzman RN    This patient presents today for allergy skin testing.      Symptoms have included facial swelling, difficulty breathing, nasal congestion, sneezing, PND and itchy/watery eyes.  Symptoms are worse in the spring and fall.  She has a known allergy to cats and a wheat intolerance.   No h/o COM or surgeries.  No past sinus surgeries.  No asthma.  She had a tonsillectomy as an adult at 20 yrs of age.  She said she breaks out in hives approximately once per year.  Family h/o allergies in mom.  She has taken Benadryl helps a little bit, and she tried Flonase in the past and that seemed to help, but she has been off that x 1-2 months.    This patient lives in a single family home, with a basement, built around 1975.  No mold, water or moisture issues in the home.  There is carpet in the home, and also in the bedroom.  Home has electric and wood stove heat.  There is a wall mounted air conditioner.    This patient has no pets    No allergy testing in the past.    This patient's medications have been reviewed prior to testing and all appropriate medications have been stopped.    Consent is signed by patient and signature is verified.     MQT/ID test is performed per protocol.  The patient tolerated testing well. Benadryl gel applied to each site post testing per protocol.  Chewable Claritin 10 mg given for post test itch per protocol.   All findings are recorded on the paper flow sheet. Results are reviewed with this patient.  They are given written " information regarding allergy.       The patient will follow-up with Cece Walsh CNP for treatment plan.        Durga Guzman RN on 2/14/2022 at 3:20 PM

## 2022-02-14 NOTE — PATIENT INSTRUCTIONS
Use loratadine redi tabs as needed   Consider allergy immunotherapy, would recommend waiting to start therapy until after you have baby    Follow up when you are ready to start immunotherapy, may need to retest      Thank you for allowing Cece SPRINGER and our ENT team to participate in your care.  If your medications are too expensive, please call my nurse at the number listed below.  We can possibly change this medication.    If you have a scheduling or an appointment question please contact our Health Unit Coordinator at their direct line [phone] ext [phone]  ALL nursing questions or concerns can be directed to my Nurse Marylou [phone].

## 2022-02-14 NOTE — PROGRESS NOTES
Tiffanie was seen for allergy skin testing. Patient was seen by this nurse in conjunction with ENT provider. All encounter details are documented in ENT Provider's appointment from this same date. Please see referenced encounter for this visits documentation.       Durga Guzman RN on 2/14/2022 at 3:19 PM

## 2022-02-14 NOTE — LETTER
2/14/2022         RE: Tiffanie Alejo  [address]  [facility] [address]        Dear Colleague,    Thank you for referring your patient, Tiffanie Alejo, to the [facility]. Please see a copy of my visit note below.    Otolaryngology Note         Chief Complaint:     Patient presents with:  Follow Up: MQT           History of Present Illness:     Tiffanie Alejo is a 31 year old female seen today for MQT    She did well with allergy testing.  No oral swelling, excessive itching, or wheezing.      MQT completed 2/14/22:  Dilution 6 (high) none  Dilution 5 (moderate) cat  Dilution 2 (low) ragweed, pigweed, maple, elm, alternaria, penicillium, Epicoccum, fusarium, helminothosporium    She interested in immunotherapy but is planning on having another child, so will hold off until after baby is born.          Medications:     Current Outpatient Rx   Medication Sig Dispense Refill     cetirizine (ZYRTEC) 10 MG tablet Take 1 tablet (10 mg) by mouth daily 30 tablet 1     EPINEPHrine (ANY BX GENERIC EQUIV) 0.3 MG/0.3ML injection 2-pack Inject 0.3 mLs (0.3 mg) into the muscle once as needed for anaphylaxis 1 each 0     ibuprofen (ADVIL/MOTRIN) 800 MG tablet Take 1 tablet (800 mg) by mouth every 8 hours as needed for pain Take with a full meal for primary pain control (Patient not taking: Reported on 2/14/2022) 50 tablet 1     cholestyramine (QUESTRAN) 4 g Packet Take 1 packet by mouth as needed  (Patient not taking: Reported on 2/14/2022)       norethindrone-ethinyl estradiol (CYCLAFEM 1/35) 1-35 MG-MCG tablet Take 1 tablet by mouth daily 84 tablet 3     triamcinolone (KENALOG) 0.1 % paste Take by mouth 2 times daily (Patient not taking: Reported on 2/14/2022) 5 g 3            Allergies:     Allergies: Cats and Wheat extract          Past Medical History:     Past Medical History:   Diagnosis Date     Gluten intolerance      Lactose Intolerance 02/22/2013     Ovarian cyst 02/22/2013      "Spondylolisthesis at L5-S1 level     grade 1     Urinary incontinence     urge            Past Surgical History:     Past Surgical History:   Procedure Laterality Date     ADENOIDECTOMY  2010      SECTION N/A 2019    Procedure:  SECTION;  Surgeon: Ankush Godinez MD;  Location: [facility]     COLONOSCOPY  2014     ESOPHAGOGASTRODUODENOSCOPY  2014     GI SURGERY  3/7/13    colonoscopy     LAPAROSCOPY DIAGNOSTIC (GENERAL)       TONSILLECTOMY       [place] Teeth Extraction         ENT family history reviewed         Social History:     Social History     Tobacco Use     Smoking status: Never Smoker     Smokeless tobacco: Never Used   Substance Use Topics     Alcohol use: Yes     Alcohol/week: 1.7 standard drinks     Types: 2 Cans of beer per week     Comment: social - weekly     Drug use: No            Review of Systems:     ROS: See HPI         Physical Exam:     /75 (BP Location: Right arm, Patient Position: Sitting, Cuff Size: Adult Regular)   Pulse 73   Ht 1.575 m (5' 2\")   Wt 54.4 kg (120 lb)   SpO2 98%   BMI 21.95 kg/m    General - The patient is well nourished and well developed, and appears to have good nutritional status.  Alert and oriented to person and place, answers questions and cooperates with examination appropriately.   Head and Face - Normocephalic and atraumatic, with no gross asymmetry noted.  The facial nerve is intact, with strong symmetric movements.  Voice and Breathing - The patient was breathing comfortably without the use of accessory muscles. There was no wheezing, stridor. The patients voice was clear and strong, and had appropriate pitch and quality.           Assessment and Plan:       ICD-10-CM    1. Perennial allergic rhinitis with seasonal variation  J30.89 cetirizine (ZYRTEC) 10 MG tablet    J30.2    2. Cat allergies  J30.81 cetirizine (ZYRTEC) 10 MG tablet     Use loratadine redi tabs as needed   Consider allergy immunotherapy, would recommend " waiting to start therapy until after you have baby    Follow up when you are ready to start immunotherapy, may need to retest    Cece SPRINGER  [facility]        Again, thank you for allowing me to participate in the care of your patient.        Sincerely,        Cece Walsh NP

## 2022-02-15 NOTE — PROGRESS NOTES
Otolaryngology Note         Chief Complaint:     Patient presents with:  Follow Up: MQT           History of Present Illness:     Tiffanie Alejo is a 31 year old female seen today for MQT    She did well with allergy testing.  No oral swelling, excessive itching, or wheezing.      MQT completed 22:  Dilution 6 (high) none  Dilution 5 (moderate) cat  Dilution 2 (low) ragweed, pigweed, maple, elm, alternaria, penicillium, Epicoccum, fusarium, helminothosporium    She interested in immunotherapy but is planning on having another child, so will hold off until after baby is born.          Medications:     Current Outpatient Rx   Medication Sig Dispense Refill     cetirizine (ZYRTEC) 10 MG tablet Take 1 tablet (10 mg) by mouth daily 30 tablet 1     EPINEPHrine (ANY BX GENERIC EQUIV) 0.3 MG/0.3ML injection 2-pack Inject 0.3 mLs (0.3 mg) into the muscle once as needed for anaphylaxis 1 each 0     ibuprofen (ADVIL/MOTRIN) 800 MG tablet Take 1 tablet (800 mg) by mouth every 8 hours as needed for pain Take with a full meal for primary pain control (Patient not taking: Reported on 2022) 50 tablet 1     cholestyramine (QUESTRAN) 4 g Packet Take 1 packet by mouth as needed  (Patient not taking: Reported on 2022)       norethindrone-ethinyl estradiol (CYCLAFEM ) 1-35 MG-MCG tablet Take 1 tablet by mouth daily 84 tablet 3     triamcinolone (KENALOG) 0.1 % paste Take by mouth 2 times daily (Patient not taking: Reported on 2022) 5 g 3            Allergies:     Allergies: Cats and Wheat extract          Past Medical History:     Past Medical History:   Diagnosis Date     Gluten intolerance      Lactose Intolerance 2013     Ovarian cyst 2013     Spondylolisthesis at L5-S1 level     grade 1     Urinary incontinence     urge            Past Surgical History:     Past Surgical History:   Procedure Laterality Date     ADENOIDECTOMY  2010      SECTION N/A 2019    Procedure:   "SECTION;  Surgeon: Ankush Godinez MD;  Location: [facility]     COLONOSCOPY  11/4/2014     ESOPHAGOGASTRODUODENOSCOPY  7/2014     GI SURGERY  3/7/13    colonoscopy     LAPAROSCOPY DIAGNOSTIC (GENERAL)       TONSILLECTOMY  2010     [place] Teeth Extraction         ENT family history reviewed         Social History:     Social History     Tobacco Use     Smoking status: Never Smoker     Smokeless tobacco: Never Used   Substance Use Topics     Alcohol use: Yes     Alcohol/week: 1.7 standard drinks     Types: 2 Cans of beer per week     Comment: social - weekly     Drug use: No            Review of Systems:     ROS: See HPI         Physical Exam:     /75 (BP Location: Right arm, Patient Position: Sitting, Cuff Size: Adult Regular)   Pulse 73   Ht 1.575 m (5' 2\")   Wt 54.4 kg (120 lb)   SpO2 98%   BMI 21.95 kg/m    General - The patient is well nourished and well developed, and appears to have good nutritional status.  Alert and oriented to person and place, answers questions and cooperates with examination appropriately.   Head and Face - Normocephalic and atraumatic, with no gross asymmetry noted.  The facial nerve is intact, with strong symmetric movements.  Voice and Breathing - The patient was breathing comfortably without the use of accessory muscles. There was no wheezing, stridor. The patients voice was clear and strong, and had appropriate pitch and quality.           Assessment and Plan:       ICD-10-CM    1. Perennial allergic rhinitis with seasonal variation  J30.89 cetirizine (ZYRTEC) 10 MG tablet    J30.2    2. Cat allergies  J30.81 cetirizine (ZYRTEC) 10 MG tablet     Use loratadine redi tabs as needed   Consider allergy immunotherapy, would recommend waiting to start therapy until after you have baby    Follow up when you are ready to start immunotherapy, may need to retest    Cece Walsh NP-C  [facility]    "

## 2022-05-11 ENCOUNTER — MYC MEDICAL ADVICE (OUTPATIENT)
Dept: OBGYN | Facility: OTHER | Age: 32
End: 2022-05-11
Payer: COMMERCIAL

## 2022-06-03 ENCOUNTER — MYC MEDICAL ADVICE (OUTPATIENT)
Dept: FAMILY MEDICINE | Facility: OTHER | Age: 32
End: 2022-06-03
Payer: COMMERCIAL

## 2022-06-03 DIAGNOSIS — L98.9 EXTERNAL NASAL LESION: Primary | ICD-10-CM

## 2022-06-19 ENCOUNTER — HEALTH MAINTENANCE LETTER (OUTPATIENT)
Age: 32
End: 2022-06-19

## 2022-07-07 ENCOUNTER — PRENATAL OFFICE VISIT (OUTPATIENT)
Dept: OBGYN | Facility: OTHER | Age: 32
End: 2022-07-07
Attending: OBSTETRICS & GYNECOLOGY
Payer: COMMERCIAL

## 2022-07-07 VITALS
SYSTOLIC BLOOD PRESSURE: 98 MMHG | WEIGHT: 126 LBS | DIASTOLIC BLOOD PRESSURE: 58 MMHG | HEIGHT: 62 IN | BODY MASS INDEX: 23.19 KG/M2

## 2022-07-07 DIAGNOSIS — O31.10X0 VANISHING TWIN SYNDROME: ICD-10-CM

## 2022-07-07 DIAGNOSIS — Z34.91 PREGNANCY WITH UNCERTAIN DATES IN FIRST TRIMESTER: ICD-10-CM

## 2022-07-07 DIAGNOSIS — Z34.81 ENCOUNTER FOR SUPERVISION OF OTHER NORMAL PREGNANCY IN FIRST TRIMESTER: Primary | ICD-10-CM

## 2022-07-07 LAB
ABO/RH(D): NORMAL
ALBUMIN UR-MCNC: NEGATIVE MG/DL
AMPHETAMINES UR QL: NOT DETECTED
ANTIBODY SCREEN: NEGATIVE
APPEARANCE UR: CLEAR
BARBITURATES UR QL SCN: NOT DETECTED
BENZODIAZ UR QL SCN: NOT DETECTED
BILIRUB UR QL STRIP: NEGATIVE
BUPRENORPHINE UR QL: NOT DETECTED
CANNABINOIDS UR QL: NOT DETECTED
COCAINE UR QL SCN: NOT DETECTED
COLOR UR AUTO: ABNORMAL
D-METHAMPHET UR QL: NOT DETECTED
ERYTHROCYTE [DISTWIDTH] IN BLOOD BY AUTOMATED COUNT: 12.5 % (ref 10–15)
GLUCOSE UR STRIP-MCNC: NEGATIVE MG/DL
HCT VFR BLD AUTO: 39.4 % (ref 35–47)
HGB BLD-MCNC: 13.4 G/DL (ref 11.7–15.7)
HGB UR QL STRIP: ABNORMAL
KETONES UR STRIP-MCNC: NEGATIVE MG/DL
LEUKOCYTE ESTERASE UR QL STRIP: NEGATIVE
MCH RBC QN AUTO: 29.5 PG (ref 26.5–33)
MCHC RBC AUTO-ENTMCNC: 34 G/DL (ref 31.5–36.5)
MCV RBC AUTO: 87 FL (ref 78–100)
METHADONE UR QL SCN: NOT DETECTED
MUCOUS THREADS #/AREA URNS LPF: PRESENT /LPF
NITRATE UR QL: NEGATIVE
OPIATES UR QL SCN: NOT DETECTED
OXYCODONE UR QL SCN: NOT DETECTED
PCP UR QL SCN: NOT DETECTED
PH UR STRIP: 5 [PH] (ref 4.7–8)
PLATELET # BLD AUTO: 209 10E3/UL (ref 150–450)
PROPOXYPH UR QL: NOT DETECTED
RBC # BLD AUTO: 4.54 10E6/UL (ref 3.8–5.2)
RBC URINE: 1 /HPF
SP GR UR STRIP: 1.01 (ref 1–1.03)
SPECIMEN EXPIRATION DATE: NORMAL
SQUAMOUS EPITHELIAL: 0 /HPF
TRICYCLICS UR QL SCN: NOT DETECTED
UROBILINOGEN UR STRIP-MCNC: NORMAL MG/DL
WBC # BLD AUTO: 7 10E3/UL (ref 4–11)
WBC URINE: <1 /HPF

## 2022-07-07 PROCEDURE — 86850 RBC ANTIBODY SCREEN: CPT | Performed by: OBSTETRICS & GYNECOLOGY

## 2022-07-07 PROCEDURE — 86706 HEP B SURFACE ANTIBODY: CPT | Performed by: OBSTETRICS & GYNECOLOGY

## 2022-07-07 PROCEDURE — 76817 TRANSVAGINAL US OBSTETRIC: CPT | Performed by: OBSTETRICS & GYNECOLOGY

## 2022-07-07 PROCEDURE — 87389 HIV-1 AG W/HIV-1&-2 AB AG IA: CPT | Performed by: OBSTETRICS & GYNECOLOGY

## 2022-07-07 PROCEDURE — 87088 URINE BACTERIA CULTURE: CPT | Performed by: OBSTETRICS & GYNECOLOGY

## 2022-07-07 PROCEDURE — 86803 HEPATITIS C AB TEST: CPT | Performed by: OBSTETRICS & GYNECOLOGY

## 2022-07-07 PROCEDURE — 86901 BLOOD TYPING SEROLOGIC RH(D): CPT | Performed by: OBSTETRICS & GYNECOLOGY

## 2022-07-07 PROCEDURE — 86762 RUBELLA ANTIBODY: CPT | Performed by: OBSTETRICS & GYNECOLOGY

## 2022-07-07 PROCEDURE — 86900 BLOOD TYPING SEROLOGIC ABO: CPT | Performed by: OBSTETRICS & GYNECOLOGY

## 2022-07-07 PROCEDURE — 81001 URINALYSIS AUTO W/SCOPE: CPT | Mod: 59 | Performed by: OBSTETRICS & GYNECOLOGY

## 2022-07-07 PROCEDURE — 87086 URINE CULTURE/COLONY COUNT: CPT | Performed by: OBSTETRICS & GYNECOLOGY

## 2022-07-07 PROCEDURE — 85027 COMPLETE CBC AUTOMATED: CPT | Performed by: OBSTETRICS & GYNECOLOGY

## 2022-07-07 PROCEDURE — 86780 TREPONEMA PALLIDUM: CPT | Performed by: OBSTETRICS & GYNECOLOGY

## 2022-07-07 PROCEDURE — 36415 COLL VENOUS BLD VENIPUNCTURE: CPT | Performed by: OBSTETRICS & GYNECOLOGY

## 2022-07-07 PROCEDURE — 80306 DRUG TEST PRSMV INSTRMNT: CPT | Performed by: OBSTETRICS & GYNECOLOGY

## 2022-07-07 PROCEDURE — 99207 PR FIRST OB VISIT: CPT | Performed by: OBSTETRICS & GYNECOLOGY

## 2022-07-07 PROCEDURE — 87340 HEPATITIS B SURFACE AG IA: CPT | Performed by: OBSTETRICS & GYNECOLOGY

## 2022-07-07 ASSESSMENT — PAIN SCALES - GENERAL: PAINLEVEL: NO PAIN (0)

## 2022-07-07 NOTE — PROGRESS NOTES
"  HPI:  Tiffanie Alejo is a 31 year old female  No LMP recorded. Patient is pregnant. at Unknown, Estimated Date of Delivery: Data Unavailable.  She denies vaginal bleeding and abdominal pain.   + nausea, occasional vomiting. No other c/o.    Prior CD at term for placental abruption.     Past Medical History:   Diagnosis Date     Gluten intolerance      Lactose Intolerance 2013     Ovarian cyst 2013     Spondylolisthesis at L5-S1 level     grade 1     Urinary incontinence     urge       Past Surgical History:   Procedure Laterality Date     ADENOIDECTOMY  2010      SECTION N/A 2019    Procedure:  SECTION;  Surgeon: Ankush Godinez MD;  Location: [facility]     COLONOSCOPY  2014     ESOPHAGOGASTRODUODENOSCOPY  2014     GI SURGERY  3/7/13    colonoscopy     LAPAROSCOPY DIAGNOSTIC (GENERAL)       TONSILLECTOMY       [place] Teeth Extraction         Allergies: Cats and Wheat extract     ROS:   Denies fever, wt loss   Neg /GI other than per HPI      EXAM:  Blood pressure 98/58, height 1.575 m (5' 2\"), weight 57.2 kg (126 lb), unknown if currently breastfeeding.   BMI= Body mass index is 23.05 kg/m .  General - pleasant female in no acute distress.  Abdomen - soft, nontender, nondistended, no hepatosplenomegaly.  Pelvic - EG: normal adult female, BUS: within normal limits,Rectovaginal - deferred.    US:    Transvaginal:Yes  Yolk sac present:Yes, ? two yolk sacs seen  CRL:  20mm  FCA present:Yes  EGA 8w 4d  IRVING:Cwd, single fetus.          ASSESSMENT/PLAN:  (Z34.90) Supervision of normal pregnancy  (primary encounter diagnosis)  Comment: Viable IUP with concordant dates, ? Early vanishing twin  Plan: ABO/Rh type and screen, CBC with platelets,         Hepatitis C antibody, HIV Antigen Antibody         Combo, Rubella Antibody IgG, Hepatitis B         surface antigen, Treponema Abs w Reflex to RPR         and Titer, UA with Microscopic reflex to         Culture, Urine Culture, " Urine Drugs of Abuse         Screen Panel 13, Treponema Abs w Reflex to RPR         and Titer, Hepatitis B Surface Antibody       Prior CD.  Counseled on TOLAC and declines.  Plans repeat CD.     1st Trimester precautions and testing discussed.  New OB Labs ordered and exam scheduled.  Aneuploidy testing options discussed.  Patient has my card and phone number to call prn if problems in interim.  Handouts on N/V pregnancy reviewed with pt.     Ankush Godinez MD

## 2022-07-07 NOTE — NURSING NOTE
"Chief Complaint   Patient presents with     Prenatal Care     Newly pregnant with LMP of 5/11/22       Initial BP 98/58   Ht 1.575 m (5' 2\")   Wt 57.2 kg (126 lb)   BMI 23.05 kg/m   Estimated body mass index is 23.05 kg/m  as calculated from the following:    Height as of this encounter: 1.575 m (5' 2\").    Weight as of this encounter: 57.2 kg (126 lb).  Medication Reconciliation: complete  JAIRO MCCALL LPN  "

## 2022-07-08 ENCOUNTER — MYC MEDICAL ADVICE (OUTPATIENT)
Dept: OBGYN | Facility: OTHER | Age: 32
End: 2022-07-08

## 2022-07-09 LAB
BACTERIA UR CULT: ABNORMAL
T PALLIDUM AB SER QL: NONREACTIVE

## 2022-07-11 ENCOUNTER — APPOINTMENT (OUTPATIENT)
Dept: ULTRASOUND IMAGING | Facility: HOSPITAL | Age: 32
End: 2022-07-11
Attending: EMERGENCY MEDICINE
Payer: COMMERCIAL

## 2022-07-11 ENCOUNTER — MYC MEDICAL ADVICE (OUTPATIENT)
Dept: OBGYN | Facility: OTHER | Age: 32
End: 2022-07-11

## 2022-07-11 ENCOUNTER — HOSPITAL ENCOUNTER (EMERGENCY)
Facility: HOSPITAL | Age: 32
Discharge: HOME | End: 2022-07-11
Attending: EMERGENCY MEDICINE | Admitting: EMERGENCY MEDICINE
Payer: COMMERCIAL

## 2022-07-11 VITALS
HEART RATE: 61 BPM | TEMPERATURE: 98.6 F | RESPIRATION RATE: 16 BRPM | OXYGEN SATURATION: 100 % | DIASTOLIC BLOOD PRESSURE: 77 MMHG | SYSTOLIC BLOOD PRESSURE: 117 MMHG

## 2022-07-11 DIAGNOSIS — O20.9 VAGINAL BLEEDING AFFECTING EARLY PREGNANCY: ICD-10-CM

## 2022-07-11 LAB
HBV SURFACE AB SERPL IA-ACNC: 16.52 M[IU]/ML
HBV SURFACE AG SERPL QL IA: NONREACTIVE
HCV AB SERPL QL IA: NONREACTIVE
HIV 1+2 AB+HIV1 P24 AG SERPL QL IA: NONREACTIVE
RUBV IGG SERPL QL IA: 5.29 INDEX
RUBV IGG SERPL QL IA: POSITIVE

## 2022-07-11 PROCEDURE — 99284 EMERGENCY DEPT VISIT MOD MDM: CPT | Mod: 25 | Performed by: EMERGENCY MEDICINE

## 2022-07-11 PROCEDURE — 76815 OB US LIMITED FETUS(S): CPT | Mod: TC | Performed by: EMERGENCY MEDICINE

## 2022-07-11 PROCEDURE — 76815 OB US LIMITED FETUS(S): CPT | Mod: 26 | Performed by: EMERGENCY MEDICINE

## 2022-07-11 PROCEDURE — 99284 EMERGENCY DEPT VISIT MOD MDM: CPT | Mod: 25

## 2022-07-11 RX ORDER — ONDANSETRON 4 MG/1
4 TABLET, ORALLY DISINTEGRATING ORAL EVERY 8 HOURS PRN
Qty: 10 TABLET | Refills: 0 | Status: SHIPPED | OUTPATIENT
Start: 2022-07-11 | End: 2022-07-22

## 2022-07-12 DIAGNOSIS — O20.0 THREATENED ABORTION: Primary | ICD-10-CM

## 2022-07-12 ASSESSMENT — ENCOUNTER SYMPTOMS
CHILLS: 0
COUGH: 0
SHORTNESS OF BREATH: 0
FEVER: 0

## 2022-07-12 NOTE — ED PROVIDER NOTES
History     Chief Complaint   Patient presents with     Vaginal Bleeding     HPI  Tiffanie Alejo is a 31 year old female who is here with vaginal bleeding, 9 weeks pregnant.  Notices it while wiping after urinating.  Nothing persistent.  No history of similar during this pregnancy.  Has mild cramping in her pelvic area.  No nausea no vomiting.  No vaginal discharge.  No other symptoms.  Has had prenatal care, confirmed IUP.  Initially twins then 1 was absorbed so just 1 pregnancy at this time.    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     Priority: Medium     Severe allergic reaction, subsequent encounter 10/28/2021     Priority: Medium     Oral mucosal lesion 10/28/2021     Priority: Medium     Well woman exam with routine gynecological exam 12/10/2013     Priority: Medium     Contraception 12/10/2013     Priority: Medium        Past Medical History:    Past Medical History:   Diagnosis Date     Gluten intolerance      Lactose Intolerance 2013     Ovarian cyst 2013     Spondylolisthesis at L5-S1 level      Urinary incontinence        Past Surgical History:    Past Surgical History:   Procedure Laterality Date     ADENOIDECTOMY  2010      SECTION N/A 2019    Procedure:  SECTION;  Surgeon: Ankush Godinez MD;  Location: [facility]     COLONOSCOPY  2014     ESOPHAGOGASTRODUODENOSCOPY  2014     GI SURGERY  3/7/13    colonoscopy     LAPAROSCOPY DIAGNOSTIC (GENERAL)       TONSILLECTOMY  2010     [place] Teeth Extraction         Family History:    Family History   Problem Relation Age of Onset     Heart Disease Mother 50        SCAD/AMI 2nd to SCAD     Breast Cancer Mother 54     Family History Negative Father         interstitial lung disease.       Social  History:  Marital Status:   [2]  Social History     Tobacco Use     Smoking status: Never Smoker     Smokeless tobacco: Never Used   Substance Use Topics     Alcohol use: Yes     Alcohol/week: 1.7 standard drinks     Types: 2 Cans of beer per week     Comment: social - weekly     Drug use: No        Medications:    cetirizine (ZYRTEC) 10 MG tablet  cholestyramine (QUESTRAN) 4 g packet  ibuprofen (ADVIL/MOTRIN) 800 MG tablet  norethindrone-ethinyl estradiol (CYCLAFEM 1/35) 1-35 MG-MCG tablet  ondansetron (ZOFRAN ODT) 4 MG ODT tab  Prenatal Vit-Fe Fumarate-FA (PRENATAL VITAMINS PO)  triamcinolone (KENALOG) 0.1 % paste  EPINEPHrine (ANY BX GENERIC EQUIV) 0.3 MG/0.3ML injection 2-pack          Review of Systems   Constitutional: Negative for chills and fever.   Respiratory: Negative for cough and shortness of breath.    All other systems reviewed and are negative.      Physical Exam   BP: 119/81  Pulse: 82  Temp: 98.6  F (37  C)  Resp: 16  SpO2: 96 %      Physical Exam  Constitutional:       General: She is not in acute distress.     Appearance: Normal appearance.   HENT:      Head: Normocephalic and atraumatic.      Right Ear: External ear normal.      Left Ear: External ear normal.      Nose: Nose normal.   Eyes:      General: No scleral icterus.     Extraocular Movements: Extraocular movements intact.   Pulmonary:      Effort: Pulmonary effort is normal. No respiratory distress.   Musculoskeletal:         General: No deformity or signs of injury.   Skin:     General: Skin is dry.      Capillary Refill: Capillary refill takes less than 2 seconds.      Coloration: Skin is not jaundiced.   Neurological:      General: No focal deficit present.      Mental Status: She is alert and oriented to person, place, and time.   Psychiatric:         Mood and Affect: Mood normal.         Behavior: Behavior normal.         ED Course                 Procedures    Results for orders placed during the hospital encounter of  07/11/22    POC US OB TRANSABDOMINAL LIMITED    Impression  Study: Trans-abdominal OB US  Indication: Pregnant, vaginal bleeding  Findings: IUP FHR ~170  Impression: Viable IUP           Critical Care time:               Results for orders placed or performed during the hospital encounter of 07/11/22 (from the past 24 hour(s))   POC US OB TRANSABDOMINAL LIMITED    Impression    Study: Trans-abdominal OB US  Indication: Pregnant, vaginal bleeding  Findings: IUP FHR ~170  Impression: Viable IUP       Medications - No data to display    Assessments & Plan (with Medical Decision Making)     I have reviewed the nursing notes.    I have reviewed the findings, diagnosis, plan and need for follow up with the patient.  31-year-old female here with spotting in context of being pregnant, 9 weeks.  Transabdominal ultrasound shows viable IUP.  No indications for RhoGAM given Rh+.  Patient stable discharge with follow-up with her OB.    Discharge Medication List as of 7/11/2022  9:46 PM      START taking these medications    Details   ondansetron (ZOFRAN ODT) 4 MG ODT tab Take 1 tablet (4 mg) by mouth every 8 hours as needed for nausea, Disp-10 tablet, R-0, [employer]eds             Final diagnoses:   Vaginal bleeding affecting early pregnancy       7/11/2022   [facility]     Maximiliano Arreguin MD  07/12/22 0600

## 2022-07-12 NOTE — TELEPHONE ENCOUNTER
Recommend f/u US (rad) 1 week for threatened AB.  No intercourse vigorous activity/exercise/heavy lifting x 2 weeks after any bleeding.  Return to ED if heavy/period type bleeding or severe pain.

## 2022-07-12 NOTE — ED TRIAGE NOTES
States 9 weeks pregnant and today developed lower abdominal cramping and some spotting when wiping and some on underwear.      Triage Assessment     Row Name 07/11/22 1929       Triage Assessment (Adult)    Airway WDL WDL

## 2022-07-13 ENCOUNTER — HOSPITAL ENCOUNTER (OUTPATIENT)
Dept: ULTRASOUND IMAGING | Facility: HOSPITAL | Age: 32
Discharge: HOME | End: 2022-07-13
Attending: OBSTETRICS & GYNECOLOGY | Admitting: OBSTETRICS & GYNECOLOGY
Payer: COMMERCIAL

## 2022-07-13 DIAGNOSIS — O20.0 THREATENED ABORTION: ICD-10-CM

## 2022-07-13 PROCEDURE — 76801 OB US < 14 WKS SINGLE FETUS: CPT

## 2022-07-19 ENCOUNTER — MYC MEDICAL ADVICE (OUTPATIENT)
Dept: OBGYN | Facility: OTHER | Age: 32
End: 2022-07-19

## 2022-07-19 NOTE — TELEPHONE ENCOUNTER
Yes that would be reasonable until your next appt.  If any heavy period type bleeding or pain please call or come in to ED.

## 2022-07-22 ENCOUNTER — MYC MEDICAL ADVICE (OUTPATIENT)
Dept: OBGYN | Facility: OTHER | Age: 32
End: 2022-07-22

## 2022-07-22 DIAGNOSIS — O21.9 VOMITING AFFECTING PREGNANCY: Primary | ICD-10-CM

## 2022-07-22 RX ORDER — ONDANSETRON 4 MG/1
4 TABLET, ORALLY DISINTEGRATING ORAL EVERY 8 HOURS PRN
Qty: 10 TABLET | Refills: 0 | Status: SHIPPED | OUTPATIENT
Start: 2022-07-22 | End: 2022-07-26

## 2022-07-26 ENCOUNTER — MYC MEDICAL ADVICE (OUTPATIENT)
Dept: OBGYN | Facility: OTHER | Age: 32
End: 2022-07-26

## 2022-07-26 DIAGNOSIS — Z34.81 ENCOUNTER FOR SUPERVISION OF OTHER NORMAL PREGNANCY IN FIRST TRIMESTER: Primary | ICD-10-CM

## 2022-07-26 DIAGNOSIS — O31.10X0 VANISHING TWIN SYNDROME: ICD-10-CM

## 2022-07-26 DIAGNOSIS — O21.9 VOMITING AFFECTING PREGNANCY: ICD-10-CM

## 2022-07-26 RX ORDER — ONDANSETRON 4 MG/1
4 TABLET, ORALLY DISINTEGRATING ORAL EVERY 8 HOURS PRN
Qty: 40 TABLET | Refills: 1 | Status: ON HOLD | OUTPATIENT
Start: 2022-07-26 | End: 2023-02-11

## 2022-07-26 NOTE — TELEPHONE ENCOUNTER
I sent in a prescription to [facility] for refills.  I ordered 40, sometimes insurance limits the amount dispensed.      We can do the NIPT genetic test.  You will be 11 weeks tomorrow.   Jossy please order.     [place]

## 2022-07-27 ENCOUNTER — LAB (OUTPATIENT)
Dept: LAB | Facility: OTHER | Age: 32
End: 2022-07-27
Payer: COMMERCIAL

## 2022-07-27 DIAGNOSIS — Z34.81 ENCOUNTER FOR SUPERVISION OF OTHER NORMAL PREGNANCY IN FIRST TRIMESTER: ICD-10-CM

## 2022-07-27 DIAGNOSIS — O31.10X0 VANISHING TWIN SYNDROME: ICD-10-CM

## 2022-07-27 PROCEDURE — 36415 COLL VENOUS BLD VENIPUNCTURE: CPT

## 2022-08-02 LAB — SCANNED LAB RESULT: NORMAL

## 2022-08-11 ENCOUNTER — TRANSCRIBE ORDERS (OUTPATIENT)
Dept: MATERNAL FETAL MEDICINE | Facility: HOSPITAL | Age: 32
End: 2022-08-11

## 2022-08-11 ENCOUNTER — PRENATAL OFFICE VISIT (OUTPATIENT)
Dept: OBGYN | Facility: OTHER | Age: 32
End: 2022-08-11
Attending: NURSE PRACTITIONER
Payer: COMMERCIAL

## 2022-08-11 VITALS
WEIGHT: 128 LBS | BODY MASS INDEX: 23.55 KG/M2 | HEIGHT: 62 IN | DIASTOLIC BLOOD PRESSURE: 62 MMHG | SYSTOLIC BLOOD PRESSURE: 100 MMHG

## 2022-08-11 DIAGNOSIS — O34.219 PREVIOUS CESAREAN DELIVERY, ANTEPARTUM CONDITION OR COMPLICATION: ICD-10-CM

## 2022-08-11 DIAGNOSIS — Z34.82 NORMAL PREGNANCY IN MULTIGRAVIDA IN SECOND TRIMESTER: ICD-10-CM

## 2022-08-11 DIAGNOSIS — Z87.59 HISTORY OF PLACENTAL ABRUPTION: ICD-10-CM

## 2022-08-11 DIAGNOSIS — Z12.4 SCREENING FOR CERVICAL CANCER: Primary | ICD-10-CM

## 2022-08-11 DIAGNOSIS — O26.90 PREGNANCY RELATED CONDITION: Primary | ICD-10-CM

## 2022-08-11 DIAGNOSIS — Z34.82 ENCOUNTER FOR SUPERVISION OF OTHER NORMAL PREGNANCY IN SECOND TRIMESTER: ICD-10-CM

## 2022-08-11 PROBLEM — Z63.9 RELATIONSHIP DYSFUNCTION: Status: RESOLVED | Noted: 2021-10-28 | Resolved: 2022-08-11

## 2022-08-11 PROBLEM — K13.70 ORAL MUCOSAL LESION: Status: RESOLVED | Noted: 2021-10-28 | Resolved: 2022-08-11

## 2022-08-11 PROBLEM — T78.40XD: Status: RESOLVED | Noted: 2021-10-28 | Resolved: 2022-08-11

## 2022-08-11 PROCEDURE — 87591 N.GONORRHOEAE DNA AMP PROB: CPT | Performed by: NURSE PRACTITIONER

## 2022-08-11 PROCEDURE — 99207 PR PRENATAL VISIT: CPT | Performed by: NURSE PRACTITIONER

## 2022-08-11 PROCEDURE — 88142 CYTOPATH C/V THIN LAYER: CPT | Performed by: NURSE PRACTITIONER

## 2022-08-11 PROCEDURE — 87491 CHLMYD TRACH DNA AMP PROBE: CPT | Performed by: NURSE PRACTITIONER

## 2022-08-11 PROCEDURE — 87624 HPV HI-RISK TYP POOLED RSLT: CPT | Performed by: NURSE PRACTITIONER

## 2022-08-11 RX ORDER — ACETAMINOPHEN 500 MG
500-1000 TABLET ORAL EVERY 6 HOURS PRN
COMMUNITY

## 2022-08-11 RX ORDER — DOCUSATE SODIUM 100 MG/1
100 CAPSULE, LIQUID FILLED ORAL 2 TIMES DAILY
COMMUNITY
End: 2023-08-16

## 2022-08-11 ASSESSMENT — PATIENT HEALTH QUESTIONNAIRE - PHQ9: SUM OF ALL RESPONSES TO PHQ QUESTIONS 1-9: 8

## 2022-08-11 ASSESSMENT — PAIN SCALES - GENERAL: PAINLEVEL: NO PAIN (0)

## 2022-08-11 NOTE — PROGRESS NOTES
"   HPI:  Tiffanie Alejo is a 32 year old female Patient's last menstrual period was 2022. at 13w1d, Estimated Date of Delivery: Feb 15, 2023.  She denies vaginal bleeding, abdominal pain and headaches. Daily n/v continues. No other c/o.    Past Medical History:   Diagnosis Date     Gluten intolerance      Lactose Intolerance 2013     Ovarian cyst 2013     Spondylolisthesis at L5-S1 level     grade 1     Urinary incontinence     urge       Past Surgical History:   Procedure Laterality Date     ADENOIDECTOMY        SECTION N/A 2019    Procedure:  SECTION;  Surgeon: Ankush Godinez MD;  Location: [facility]     COLONOSCOPY  2014     ESOPHAGOGASTRODUODENOSCOPY  2014     GI SURGERY  3/7/13    colonoscopy     LAPAROSCOPY DIAGNOSTIC (GENERAL)       TONSILLECTOMY       [place] Teeth Extraction         Allergies: Cats and Wheat extract     EXAM:  Blood pressure 100/62, height 1.575 m (5' 2\"), weight 58.1 kg (128 lb), last menstrual period 2022, unknown if currently breastfeeding.   BMI= Body mass index is 23.41 kg/m .  General - pleasant female in no acute distress.  Neck - supple without lymphadenopathy or thyromegaly.  Lungs - clear to auscultation bilaterally.  Heart - regular rate and rhythm without murmur.  Abdomen - soft, nontender, nondistended, no hepatosplenomegaly.  Pelvic - EG: normal adult female, BUS: within normal limits, Vagina: well rugated, no discharge, Cervix: no lesions or CMT, closed/long Uterus: gravid, consistant with dates, mobile, Adnexae: no masses or tenderness.  Rectovaginal - deferred.  Musculoskeletal - no gross deformities.  Neurological - normal strength, sensation, and mental status.    Doptones were 166    ASSESSMENT/PLAN:  (Z12.4) Screening for cervical cancer  (primary encounter diagnosis)  Comment:   Plan: A pap thin layer diagnostic with HPV            (Z34.82) Encounter for supervision of other normal pregnancy in second " trimester  Comment:   Plan: GC/Chlamydia by PCR - GEORGIE OLMOS            (Z34.82) Normal pregnancy in multigravida in second trimester  Comment: new ob physical and teaching completed.  Plan: return in 4 weeks.       Weight gain and exercise during pregnancy was discussed at today's visit.  The patient will return to clinic in 4 weeks for continued prenatal care.

## 2022-08-11 NOTE — PROGRESS NOTES
Have you had or do you currently have:    - Diabetes? n  - Hypertension? n  - Heart disease, mitral valve prolapse, or rheumatic fever?  n  - An autoimmune disease such as lupus or rheumatoid arthritis?  n  - Kidney disease, urinary tract infection?  y  - Epilepsy, seizures, or spells?  n  - Migraine headaches?  y  - Any other neurological problems?  n  - Have you ever been treated for depression?  n  - Are you having problems with crying spells or loss of self-esteem?  n  - Have you ever required psychiatric care?  n  - Have you ever had hepatitis, liver disease, or jaundice?  n  - Have you ever been treated for blood clots in your veins, deep vein thrombosis, inflammation in the veins, thrombosis, phelbitis, pulmonary embolism or varicosities?  n  - Have you had excessive bleeding after surgery or dental work?  n  - Do you bleed more than other women after a cut or scratch?  n  - Do you have a history or anemia?  y  - Have you ever had thyroid problems or take thyroid medication?  n  - Do you have any endocrine problems?  n  - Have you every been in a major accident or suffered serious trauma?  n  - Within the last year, has anyone hit, slapped, kicked, or otherwise hurt you?  n  - In the last year, has anyone forced you to have sex when you didn't want to?  n  - Have you every received a blood transfusion?  n  - Would you refuse a blood transfusion if a doctor judged it to be medically necessary?  n  - Does anyone in your home smoke? n  - Do you use tobacco products?  n  - Do you drink beer, wine, or hard liquor?  n  - Do you use any of the following: marijuana, speed, cocaine, heroin, hallucinogens, or other drugs?  n  - Is your blood type RH negative?  n  - Have you ever had asthma?  n  - Have you ever had tuberculosis?  n  - Do you have any allergies to drugs or over-the-counter medications?  n  - Allergies: dust mites, aspartame, ethanol, venlafaxine hydrochloride, sertraline?  n  - Have you had any breast  problems?  n  - Have you ever breast-fed?  y  - Have you had any gynecological surgical procedures such as cervical conization, LEEP, laser treatment, cryosurgery of the cervix, or a dilatation and curettage, etc?  n  - Have you ever had any other surgical procedures?  y  - Have you ever had any anesthetic complications?  n  - Have you ever had an abnormal pap smear?  y  - Do you have a history of abnormalities of the uterus? n  - Did your mother take STEVO or any other hormones when she was pregnant with you?  n  - Did it take you more than one year to become pregnant?  n  - Have you ever been evaluated or treated for infertility?  n  - Is there a history of medical problems in your family, which you feel might adversely affect your health or pregnancy?  n  - Do you have any other problems we have not asked about which you feel may be important to this pregnancy?  n    Symptoms since last menstrual period  - Do you currently have any of the following symptoms: abdominal pain, blood in the stool or urine, chest pain, shortness of breath, coughing or vomiting up blood, you heart is racing or skipping beats, nausea and vomiting, pain on urination, or vaginal discharge or bleeding?  y    Genetic screening  Has the patient, baby's father, or anyone in either family had:  - Thalassemia (Italian, Greek, Mediterranean, or  background only) and an MCV result less than 80?  n  - Neural tube defect such as meningomyelocele, spina bifida, or anencephaly?  n  - Congential heart defect?  n  - Down's syndrome?  n  - Norberto-Sachs disease ( [place], Cajun, [place])?  n  - Sickle cell disease or trait () ?  n  - Hemophilia or other inherited problems of blood?  n  - Muscular dystrophy?  n  - Cystic fibrosis?  n  - [place]'s chorea?  n  - Mental retardation/autism?  n   If yes, was the person tested for Fragile X?  n  - Any other inherited genetic or chromosomal disorder?  n  - Maternal metabolic disorder (e.g.  insulin- dependent diabetes, PKU)?  n  - A child with birth defects not listed above?  n  - Recurrent pregnancy loss, or a stillbirth?  n  - Has the patient had any medications/street drugs/alcohol since her last menstrual period?  n  - Does the patient or baby's father have any other genetic risk?  n    Infection history  - Have you ever been treated for tuberculosis?  n  - Have you every had a positive skin test for tuberculosis?  n  - Do you live with someone who has tuberculosis?  n  - Have you ever been exposed to tuberculosis?  n  - Do you have genital herpes?  n  - Does your partner have genital herpes?  n  - Have you had a rash or viral illness since your last period?  y  - Have you ever had gonorrhea, chlamydia, syphilis, venereal warts, trichomoniasis, pelvic inflammatory disease, or any other sexually transmitted disease?  n  - Have you had chicken pox?  y  - Have you been vaccinated against chicken pox?  n  - Have you had any other infectious diseases?  n

## 2022-08-12 LAB
C TRACH DNA SPEC QL PROBE+SIG AMP: NEGATIVE
N GONORRHOEA DNA SPEC QL NAA+PROBE: NEGATIVE

## 2022-08-18 LAB
BKR LAB AP GYN ADEQUACY: NORMAL
BKR LAB AP GYN INTERPRETATION: NORMAL
BKR LAB AP HPV REFLEX: NORMAL
BKR LAB AP PREVIOUS ABNL DX: NORMAL
BKR LAB AP PREVIOUS ABNORMAL: NORMAL
PATH REPORT.COMMENTS IMP SPEC: NORMAL
PATH REPORT.COMMENTS IMP SPEC: NORMAL
PATH REPORT.RELEVANT HX SPEC: NORMAL

## 2022-08-22 LAB
HUMAN PAPILLOMA VIRUS 16 DNA: NEGATIVE
HUMAN PAPILLOMA VIRUS 18 DNA: NEGATIVE
HUMAN PAPILLOMA VIRUS FINAL DIAGNOSIS: NORMAL
HUMAN PAPILLOMA VIRUS OTHER HR: NEGATIVE

## 2022-09-09 ENCOUNTER — PRENATAL OFFICE VISIT (OUTPATIENT)
Dept: OBGYN | Facility: OTHER | Age: 32
End: 2022-09-09
Attending: OBSTETRICS & GYNECOLOGY
Payer: COMMERCIAL

## 2022-09-09 VITALS
WEIGHT: 135.9 LBS | DIASTOLIC BLOOD PRESSURE: 64 MMHG | HEIGHT: 62 IN | SYSTOLIC BLOOD PRESSURE: 102 MMHG | BODY MASS INDEX: 25.01 KG/M2

## 2022-09-09 DIAGNOSIS — Z34.82 ENCOUNTER FOR SUPERVISION OF OTHER NORMAL PREGNANCY IN SECOND TRIMESTER: ICD-10-CM

## 2022-09-09 DIAGNOSIS — B30.8 CHRONIC VIRAL CONJUNCTIVITIS OF BOTH EYES: Primary | ICD-10-CM

## 2022-09-09 PROCEDURE — 99207 PR PRENATAL VISIT: CPT | Performed by: OBSTETRICS & GYNECOLOGY

## 2022-09-09 RX ORDER — POLYMYXIN B SULFATE AND TRIMETHOPRIM 1; 10000 MG/ML; [USP'U]/ML
1-2 SOLUTION OPHTHALMIC EVERY 4 HOURS
Qty: 10 ML | Refills: 0 | Status: SHIPPED | OUTPATIENT
Start: 2022-09-09 | End: 2023-01-26

## 2022-09-09 ASSESSMENT — PAIN SCALES - GENERAL: PAINLEVEL: MILD PAIN (3)

## 2022-09-09 NOTE — PROGRESS NOTES
Doing well.  No concerns today except pink eye from child who has it.  Antibiotic eye drop rx. .  Some lower abdominal stretching/cramping/ligament pain.   NIPT nml  US for full fetal anatomical survey scheduled(level 2).  Return to clinic in 4 weeks    Ankush Godinez MD  9/9/2022

## 2022-09-09 NOTE — NURSING NOTE
"Chief Complaint   Patient presents with     Prenatal Care       Initial /64   Ht 1.575 m (5' 2\")   Wt 61.6 kg (135 lb 14.4 oz)   LMP 05/11/2022   BMI 24.86 kg/m   Estimated body mass index is 24.86 kg/m  as calculated from the following:    Height as of this encounter: 1.575 m (5' 2\").    Weight as of this encounter: 61.6 kg (135 lb 14.4 oz).  Medication Reconciliation: complete  Cece Gamboa, FRAN    "

## 2022-09-15 ENCOUNTER — PRE VISIT (OUTPATIENT)
Dept: MATERNAL FETAL MEDICINE | Facility: CLINIC | Age: 32
End: 2022-09-15

## 2022-09-20 ENCOUNTER — OFFICE VISIT (OUTPATIENT)
Dept: MATERNAL FETAL MEDICINE | Facility: CLINIC | Age: 32
End: 2022-09-20
Attending: NURSE PRACTITIONER
Payer: COMMERCIAL

## 2022-09-20 ENCOUNTER — HOSPITAL ENCOUNTER (OUTPATIENT)
Dept: ULTRASOUND IMAGING | Facility: CLINIC | Age: 32
Discharge: HOME | End: 2022-09-20
Attending: NURSE PRACTITIONER
Payer: COMMERCIAL

## 2022-09-20 DIAGNOSIS — O26.90 PREGNANCY RELATED CONDITION: ICD-10-CM

## 2022-09-20 DIAGNOSIS — O09.522 MULTIGRAVIDA OF ADVANCED MATERNAL AGE IN SECOND TRIMESTER: Primary | ICD-10-CM

## 2022-09-20 DIAGNOSIS — Z87.59 HISTORY OF PLACENTA ABRUPTION: ICD-10-CM

## 2022-09-20 PROCEDURE — 76811 OB US DETAILED SNGL FETUS: CPT

## 2022-09-20 PROCEDURE — 76811 OB US DETAILED SNGL FETUS: CPT | Mod: 26 | Performed by: OBSTETRICS & GYNECOLOGY

## 2022-09-20 NOTE — PROGRESS NOTES
The patient was seen for an ultrasound in the [facility] at the [facility] today.  For a detailed report of the ultrasound examination, please see the ultrasound report which can be found under the imaging tab.    Mag Green MD  , OB/GYN  Maternal-Fetal Medicine  [phone] (Pager)

## 2022-10-12 ENCOUNTER — HOSPITAL ENCOUNTER (EMERGENCY)
Facility: HOSPITAL | Age: 32
Discharge: HOME | End: 2022-10-12
Attending: EMERGENCY MEDICINE | Admitting: EMERGENCY MEDICINE
Payer: COMMERCIAL

## 2022-10-12 ENCOUNTER — HOSPITAL ENCOUNTER (OUTPATIENT)
Facility: HOSPITAL | Age: 32
End: 2022-10-12
Admitting: OBSTETRICS & GYNECOLOGY
Payer: COMMERCIAL

## 2022-10-12 ENCOUNTER — HOSPITAL ENCOUNTER (OUTPATIENT)
Facility: HOSPITAL | Age: 32
Discharge: HOME | End: 2022-10-12
Attending: OBSTETRICS & GYNECOLOGY | Admitting: OBSTETRICS & GYNECOLOGY
Payer: COMMERCIAL

## 2022-10-12 VITALS
RESPIRATION RATE: 16 BRPM | BODY MASS INDEX: 25.96 KG/M2 | HEART RATE: 71 BPM | OXYGEN SATURATION: 97 % | DIASTOLIC BLOOD PRESSURE: 61 MMHG | SYSTOLIC BLOOD PRESSURE: 99 MMHG | WEIGHT: 141.09 LBS | TEMPERATURE: 97.2 F | HEIGHT: 62 IN

## 2022-10-12 VITALS
DIASTOLIC BLOOD PRESSURE: 67 MMHG | HEIGHT: 62 IN | TEMPERATURE: 97.4 F | OXYGEN SATURATION: 95 % | WEIGHT: 139 LBS | SYSTOLIC BLOOD PRESSURE: 99 MMHG | RESPIRATION RATE: 16 BRPM | BODY MASS INDEX: 25.58 KG/M2

## 2022-10-12 DIAGNOSIS — G43.909 MIGRAINE WITHOUT STATUS MIGRAINOSUS, NOT INTRACTABLE, UNSPECIFIED MIGRAINE TYPE: ICD-10-CM

## 2022-10-12 DIAGNOSIS — R11.2 NAUSEA AND VOMITING, UNSPECIFIED VOMITING TYPE: ICD-10-CM

## 2022-10-12 DIAGNOSIS — R51.9 ACUTE NONINTRACTABLE HEADACHE, UNSPECIFIED HEADACHE TYPE: ICD-10-CM

## 2022-10-12 LAB
ALBUMIN SERPL BCG-MCNC: 4 G/DL (ref 3.5–5.2)
ALBUMIN UR-MCNC: NEGATIVE MG/DL
ALP SERPL-CCNC: 46 U/L (ref 35–104)
ALT SERPL W P-5'-P-CCNC: 9 U/L (ref 10–35)
ANION GAP SERPL CALCULATED.3IONS-SCNC: 10 MMOL/L (ref 7–15)
APPEARANCE UR: CLEAR
AST SERPL W P-5'-P-CCNC: 15 U/L (ref 10–35)
BASOPHILS # BLD AUTO: 0 10E3/UL (ref 0–0.2)
BASOPHILS NFR BLD AUTO: 0 %
BILIRUB SERPL-MCNC: 0.3 MG/DL
BILIRUB UR QL STRIP: NEGATIVE
BUN SERPL-MCNC: 5.3 MG/DL (ref 6–20)
CALCIUM SERPL-MCNC: 8.8 MG/DL (ref 8.6–10)
CHLORIDE SERPL-SCNC: 103 MMOL/L (ref 98–107)
COLOR UR AUTO: ABNORMAL
CREAT SERPL-MCNC: 0.44 MG/DL (ref 0.51–0.95)
DEPRECATED HCO3 PLAS-SCNC: 22 MMOL/L (ref 22–29)
EOSINOPHIL # BLD AUTO: 0.1 10E3/UL (ref 0–0.7)
EOSINOPHIL NFR BLD AUTO: 1 %
ERYTHROCYTE [DISTWIDTH] IN BLOOD BY AUTOMATED COUNT: 13.3 % (ref 10–15)
FLUAV RNA SPEC QL NAA+PROBE: NEGATIVE
FLUBV RNA RESP QL NAA+PROBE: NEGATIVE
GFR SERPL CREATININE-BSD FRML MDRD: >90 ML/MIN/1.73M2
GLUCOSE SERPL-MCNC: 86 MG/DL (ref 70–99)
GLUCOSE UR STRIP-MCNC: NEGATIVE MG/DL
HCT VFR BLD AUTO: 37 % (ref 35–47)
HGB BLD-MCNC: 12.6 G/DL (ref 11.7–15.7)
HGB UR QL STRIP: NEGATIVE
HOLD SPECIMEN: NORMAL
IMM GRANULOCYTES # BLD: 0.1 10E3/UL
IMM GRANULOCYTES NFR BLD: 0 %
KETONES UR STRIP-MCNC: 10 MG/DL
LEUKOCYTE ESTERASE UR QL STRIP: NEGATIVE
LIPASE SERPL-CCNC: 26 U/L (ref 13–60)
LYMPHOCYTES # BLD AUTO: 1.3 10E3/UL (ref 0.8–5.3)
LYMPHOCYTES NFR BLD AUTO: 10 %
MAGNESIUM SERPL-MCNC: 1.9 MG/DL (ref 1.7–2.3)
MCH RBC QN AUTO: 30 PG (ref 26.5–33)
MCHC RBC AUTO-ENTMCNC: 34.1 G/DL (ref 31.5–36.5)
MCV RBC AUTO: 88 FL (ref 78–100)
MONOCYTES # BLD AUTO: 0.5 10E3/UL (ref 0–1.3)
MONOCYTES NFR BLD AUTO: 4 %
MUCOUS THREADS #/AREA URNS LPF: PRESENT /LPF
NEUTROPHILS # BLD AUTO: 10.9 10E3/UL (ref 1.6–8.3)
NEUTROPHILS NFR BLD AUTO: 85 %
NITRATE UR QL: NEGATIVE
NRBC # BLD AUTO: 0 10E3/UL
NRBC BLD AUTO-RTO: 0 /100
PH UR STRIP: 7 [PH] (ref 4.7–8)
PLATELET # BLD AUTO: 222 10E3/UL (ref 150–450)
POTASSIUM SERPL-SCNC: 3.8 MMOL/L (ref 3.4–5.3)
PROT SERPL-MCNC: 6.6 G/DL (ref 6.4–8.3)
RBC # BLD AUTO: 4.2 10E6/UL (ref 3.8–5.2)
RBC URINE: <1 /HPF
RSV RNA SPEC NAA+PROBE: NEGATIVE
SARS-COV-2 RNA RESP QL NAA+PROBE: NEGATIVE
SODIUM SERPL-SCNC: 135 MMOL/L (ref 136–145)
SP GR UR STRIP: 1.01 (ref 1–1.03)
SQUAMOUS EPITHELIAL: 0 /HPF
UROBILINOGEN UR STRIP-MCNC: NORMAL MG/DL
WBC # BLD AUTO: 12.9 10E3/UL (ref 4–11)
WBC URINE: <1 /HPF

## 2022-10-12 PROCEDURE — 250N000011 HC RX IP 250 OP 636: Performed by: FAMILY MEDICINE

## 2022-10-12 PROCEDURE — 81001 URINALYSIS AUTO W/SCOPE: CPT | Performed by: EMERGENCY MEDICINE

## 2022-10-12 PROCEDURE — 96360 HYDRATION IV INFUSION INIT: CPT

## 2022-10-12 PROCEDURE — 80053 COMPREHEN METABOLIC PANEL: CPT | Performed by: EMERGENCY MEDICINE

## 2022-10-12 PROCEDURE — G0463 HOSPITAL OUTPT CLINIC VISIT: HCPCS

## 2022-10-12 PROCEDURE — 99284 EMERGENCY DEPT VISIT MOD MDM: CPT | Performed by: FAMILY MEDICINE

## 2022-10-12 PROCEDURE — C9803 HOPD COVID-19 SPEC COLLECT: HCPCS

## 2022-10-12 PROCEDURE — 99283 EMERGENCY DEPT VISIT LOW MDM: CPT | Mod: 25,27

## 2022-10-12 PROCEDURE — 83735 ASSAY OF MAGNESIUM: CPT | Performed by: EMERGENCY MEDICINE

## 2022-10-12 PROCEDURE — 85025 COMPLETE CBC W/AUTO DIFF WBC: CPT | Performed by: EMERGENCY MEDICINE

## 2022-10-12 PROCEDURE — 258N000003 HC RX IP 258 OP 636: Performed by: EMERGENCY MEDICINE

## 2022-10-12 PROCEDURE — 36415 COLL VENOUS BLD VENIPUNCTURE: CPT | Performed by: EMERGENCY MEDICINE

## 2022-10-12 PROCEDURE — 87637 SARSCOV2&INF A&B&RSV AMP PRB: CPT | Performed by: EMERGENCY MEDICINE

## 2022-10-12 PROCEDURE — 250N000013 HC RX MED GY IP 250 OP 250 PS 637: Performed by: FAMILY MEDICINE

## 2022-10-12 PROCEDURE — G0463 HOSPITAL OUTPT CLINIC VISIT: HCPCS | Mod: 25

## 2022-10-12 PROCEDURE — 83690 ASSAY OF LIPASE: CPT | Performed by: EMERGENCY MEDICINE

## 2022-10-12 PROCEDURE — 99283 EMERGENCY DEPT VISIT LOW MDM: CPT

## 2022-10-12 RX ORDER — ACETAMINOPHEN 325 MG/1
975 TABLET ORAL ONCE
Status: COMPLETED | OUTPATIENT
Start: 2022-10-12 | End: 2022-10-12

## 2022-10-12 RX ORDER — ONDANSETRON 4 MG/1
4 TABLET, ORALLY DISINTEGRATING ORAL ONCE
Status: COMPLETED | OUTPATIENT
Start: 2022-10-12 | End: 2022-10-12

## 2022-10-12 RX ORDER — LIDOCAINE 40 MG/G
CREAM TOPICAL
Status: DISCONTINUED | OUTPATIENT
Start: 2022-10-12 | End: 2022-10-12 | Stop reason: HOSPADM

## 2022-10-12 RX ADMIN — ACETAMINOPHEN 975 MG: 325 TABLET, FILM COATED ORAL at 20:04

## 2022-10-12 RX ADMIN — ONDANSETRON 4 MG: 4 TABLET, ORALLY DISINTEGRATING ORAL at 19:51

## 2022-10-12 RX ADMIN — SODIUM CHLORIDE 1000 ML: 9 INJECTION, SOLUTION INTRAVENOUS at 18:59

## 2022-10-12 ASSESSMENT — ACTIVITIES OF DAILY LIVING (ADL)
ADLS_ACUITY_SCORE: 31
ADLS_ACUITY_SCORE: 35
ADLS_ACUITY_SCORE: 35

## 2022-10-12 NOTE — CARE PLAN
"Tiffanie Alejo is a 32 year old  and 22w0d patient came in complaining of Headache    Patient Active Problem List   Diagnosis     Normal pregnancy in multigravida in second trimester       Pt discharged at 1730 and taken to the ED to be seen, per Dr Godinez's request.  Pt was encouraged to rest and drink plenty of fluids.  Pt told to call/return if symptoms persist or worsen.  Reviewed fetal kick counts, signs and symptoms to monitor and reasons to call.      Nursing education on plan of are provided.  All questions answered and patient verbalizes understanding.    BP 99/67   Temp 97.4  F (36.3  C) (Oral)   Resp 16   Ht 1.575 m (5' 2\")   Wt 63 kg (139 lb)   LMP 2022   SpO2 95%   BMI 25.42 kg/m      Cervical status: not examined  Fetal Assessment:category I  Discharge support:  Family/Friend Support and RN.  Pt taken to admitting via wheelchair.     OB History    Para Term  AB Living   2 1 1 0 0 1   SAB IAB Ectopic Multiple Live Births   0 0 0 0 1      # Outcome Date GA Lbr Eugenio/2nd Weight Sex Delivery Anes PTL Lv   2 Current            1 Term 19 38w3d  3.34 kg (7 lb 5.8 oz) M  Spinal N DOMINGA      Name: Xander      Apgar1: 9  Apgar5: 9       Jeanne Augustin, RN    "

## 2022-10-12 NOTE — CARE PLAN
Dr Godinez called the Unit and was updated on pt's arrival and status including complaints of throbbing headache rating it a 7/10, emesis x 4 since 3:30 today and diarrhea x 1, as well as a stuffy nose for 1 day.  /74.  Reflexes WDL, no edema, no clonus or RUQ pain.  Category I FHTs.  Dr Godinez stated to discharge pt and have her be seen in the ER. Will update pt on the status.

## 2022-10-12 NOTE — CARE PLAN
OB Triage Note  Tiffanie Alejo  MRN: [id number]  Gestational Age: 22w0d      Tiffanie Alejo presents for headache which she had this am, but it got worse around 3pm.  Pt stated the headache was a 7/10 and it was so severe that her vision was altered for about a minute while she was driving and she had to pull over.  Pt stated she vomited four times since 3:30pm and has had diarrhea once today.  Reports a stuffy nose for 1 day.  She said she was able to eat lunch and took tylenol and zofran around 4 pm and vomited after taking them.   (sign/symptom/concern).    BP WDL, no edema noted, no clonus noted and reflexes WDL.  Pt denies any visual changes upon arrival to [facility], denies any epigastric pain.   Pt reports adequate fetal movement.  Denies any vaginal bleeding, LOF or contractions.      Oriented patient to surroundings. Call light within reach.     FHT: category I  Uterine Assessment:Contractions: none noted per toco, abdomen palpates soft.   Plan:  -fetal/uterine monitoring per MD/patient plan.        Jeanne Augustin RN

## 2022-10-12 NOTE — DISCHARGE INSTRUCTIONS
Discharge Instructions for Undelivered Patients    Diet:  * Drink 8 to 12 glasses of liquids (milk, juice, water) every day  * You may eat meals and snacks.    Activity:  * Count fetal kicks every day.  * Call your doctor if your baby is moving less than usual.    Call your provider if you notice:  * Swelling in your face or increased swelling in your hands or legs.  * Headaches that are not relieved by Tylenol (acetaminophen).  * Changes in your vision (blurring; seeing spots or stars).  * Nausea (sick to your stomach) and vomiting (throwing up).  * Weight gain of 5 pounds per week.  * Heartburn that doesn't go away.  * Signs of bladder infection: Pain when you urinate (use the toilet), needing to go more often or more urgently.  * The bag of de la torre (membrane) breaks, or you notice leaking in your underwear.  * Bright red blood in your underwear.  * Abdominal (lower belly) or stomach pain  *Contractions: more than 6 in 1 hr.  * Increase or change in vaginal discharge (note the color and amount).    Dr Godinez would like you to be evaluated in the ER.  Please go to the ER upon discharge from OB Triage.     [facility]: [phone]

## 2022-10-12 NOTE — ED PROVIDER NOTES
History     Chief Complaint   Patient presents with     Headache     HPI  Tiffanie Alejo is a 32 year old female who presents with headache, nausea/vomiting. Started early this morning, headache worsened throughout the day. Vomited 4 times, one loose stool, no blood. Tried taking tylenol and Zofran around 4 pm but vomited 15 minutes later.  Reports at 330 had episode of blurred vision that lasted 1 minute. No fever, chest pain, does report congestion, no cough. No abdominal pain, no dysuria/hematuria. No leg swelling, no rash. No sick contacts at home.  at 22 weeks, no complications in pregnancy. Was seen upstairs for nonstress testing, normal, was sent down for further evaluation.     Works as   Not vaccinated for covid  Previous laparoscopy negative for hernia, previous     Allergies:  Allergies   Allergen Reactions     Cats      Facial swelling     Wheat Extract      Stomach pains, constipation and diarrhea       Problem List:    Patient Active Problem List    Diagnosis Date Noted     Normal pregnancy in multigravida in second trimester 2022     Priority: Medium     NIPT neg  surprize  no covid vax  Hx of CD and abruption - level II  Breast - pumping          Past Medical History:    Past Medical History:   Diagnosis Date     Gluten intolerance      Lactose Intolerance 2013     Ovarian cyst 2013     Spondylolisthesis at L5-S1 level      Urinary incontinence        Past Surgical History:    Past Surgical History:   Procedure Laterality Date     ADENOIDECTOMY  2010      SECTION N/A 2019    Procedure:  SECTION;  Surgeon: Ankush Godinez MD;  Location: [facility]     COLONOSCOPY  2014     ESOPHAGOGASTRODUODENOSCOPY  2014     GI SURGERY  3/7/13    colonoscopy     LAPAROSCOPY DIAGNOSTIC (GENERAL)       TONSILLECTOMY  2010     [place] Teeth Extraction         Family History:    Family History   Problem Relation Age of Onset     Heart Disease Mother 50     "    SCAD/AMI 2nd to SCAD     Breast Cancer Mother 54     Family History Negative Father         interstitial lung disease.       Social History:  Marital Status:   [2]  Social History     Tobacco Use     Smoking status: Never     Smokeless tobacco: Never   Substance Use Topics     Alcohol use: Yes     Alcohol/week: 1.7 standard drinks     Types: 2 Cans of beer per week     Comment: social - weekly     Drug use: No        Medications:    acetaminophen (TYLENOL) 500 MG tablet  cetirizine (ZYRTEC) 10 MG tablet  cholestyramine (QUESTRAN) 4 g packet  docusate sodium (COLACE) 100 MG capsule  EPINEPHrine (ANY BX GENERIC EQUIV) 0.3 MG/0.3ML injection 2-pack  metoclopramide (REGLAN) 10 MG tablet  ondansetron (ZOFRAN ODT) 4 MG ODT tab  Prenatal Vit-Fe Fumarate-FA (PRENATAL VITAMINS PO)  triamcinolone (KENALOG) 0.1 % paste  trimethoprim-polymyxin b (POLYTRIM) 81921-8.1 UNIT/ML-% ophthalmic solution          Review of Systems   All other systems reviewed and are negative.    Please seen HPI for pertinent positives and negatives. All other systems reviewed and found to be negative.   Physical Exam   BP: 113/75  Pulse: 82  Temp: 97.2  F (36.2  C)  Resp: 16  Height: 157.5 cm (5' 2\")  Weight: 64 kg (141 lb 1.5 oz)  SpO2: 99 %      Physical Exam  Constitutional:       General: She is not in acute distress.     Appearance: Normal appearance. She is not ill-appearing, toxic-appearing or diaphoretic.   HENT:      Head: Normocephalic and atraumatic.      Nose: Nose normal.      Mouth/Throat:      Mouth: Mucous membranes are moist.      Pharynx: Oropharynx is clear.   Eyes:      Extraocular Movements: Extraocular movements intact.      Conjunctiva/sclera: Conjunctivae normal.      Pupils: Pupils are equal, round, and reactive to light.   Neck:      Vascular: No carotid bruit.   Cardiovascular:      Rate and Rhythm: Normal rate and regular rhythm.   Pulmonary:      Effort: Pulmonary effort is normal. No respiratory distress.      " Breath sounds: Normal breath sounds. No stridor. No wheezing, rhonchi or rales.   Chest:      Chest wall: No tenderness.   Abdominal:      General: Abdomen is flat. There is no distension.      Palpations: Abdomen is soft. There is no mass.      Tenderness: There is no abdominal tenderness. There is no right CVA tenderness, left CVA tenderness, guarding or rebound.      Hernia: No hernia is present.   Musculoskeletal:      Cervical back: Normal range of motion and neck supple. No rigidity or tenderness.      Right lower leg: No edema.      Left lower leg: No edema.      Comments: 5/5 strength upper and lower extremities    Lymphadenopathy:      Cervical: No cervical adenopathy.   Skin:     General: Skin is warm and dry.      Findings: No rash.   Neurological:      General: No focal deficit present.      Mental Status: She is alert and oriented to person, place, and time.      Cranial Nerves: No cranial nerve deficit.      Sensory: No sensory deficit.      Motor: No weakness.      Coordination: Coordination normal.      Gait: Gait normal.      Deep Tendon Reflexes: Reflexes normal.      Comments: Pupils equal, round and reactive to light and accommodation, extraoccular muscles intact without nystagmus, CN II-XII intact, strength and sensation to all extremities intact, no pronator drift         ED Course              ED Course as of 11/23/22 0739   Wed Oct 12, 2022   1905 Signed out to oncoming provider with initial labs pending.     Procedures              Critical Care time:  none               No results found for this or any previous visit (from the past 24 hour(s)).    Medications   0.9% sodium chloride BOLUS (0 mLs Intravenous Stopped 10/12/22 1951)   acetaminophen (TYLENOL) tablet 975 mg (975 mg Oral Given 10/12/22 2004)   ondansetron (ZOFRAN ODT) ODT tab 4 mg (4 mg Oral Given 10/12/22 1951)       Assessments & Plan (with Medical Decision Making)     I have reviewed the nursing notes.    I have reviewed the  findings, diagnosis, plan and need for follow up with the patient.   Ms Alejo is a 31 yo woman who presents with 1 day gradual onset headache, nausea, vomiting, diarrhea. Had normal NST from OBGYN. Vitals are WNL, no trauma, neurological exam reassuring. No thunderclap headache to suggest intracranial hemorrhage. No neck stiffness or fever to suggest meningitis. No hypertension, edema to suggest preeclampsia. Will obtain labs to look for electrolyte abnormalities, LFT abnormalities, pancreatitis. No abdominal tenderness so do not feel imaging is indicated at this time. Will give fluids. Unable to redose acetaminophen/zofran yet. Will also test for covid.    Discharge Medication List as of 10/12/2022  9:23 PM          Final diagnoses:   Acute nonintractable headache, unspecified headache type   Nausea and vomiting, unspecified vomiting type   Migraine without status migrainosus, not intractable, unspecified migraine type       10/12/2022   [facility]     Lisseth Jones MD  10/12/22 0126       Mark Estes MD  11/23/22 [id number]

## 2022-10-12 NOTE — ED TRIAGE NOTES
32 year old  female  22 weeks pregnant who presents with headache, nausea, vomiting and one episode of diarrhea. Patient was evaluated by OB prior to arrival - FHTs 140s. Patient states she took Tylenol 1000mg and ODT Zofran at 1600, but vomiting right after. BP stable in triage.

## 2022-10-13 NOTE — ED PROVIDER NOTES
History     Chief Complaint   Patient presents with     Headache     HPI  Tiffanie Alejo is a 32 year old female who headache.  Please refer to Dr. Jones note for further details.    Allergies:  Allergies   Allergen Reactions     Cats      Facial swelling     Wheat Extract      Stomach pains, constipation and diarrhea       Problem List:    Patient Active Problem List    Diagnosis Date Noted     Normal pregnancy in multigravida in second trimester 2022     Priority: Medium     NIPT neg  surprize  no covid vax  Hx of CD and abruption - level II  Breast - pumping          Past Medical History:    Past Medical History:   Diagnosis Date     Gluten intolerance      Lactose Intolerance 2013     Ovarian cyst 2013     Spondylolisthesis at L5-S1 level      Urinary incontinence        Past Surgical History:    Past Surgical History:   Procedure Laterality Date     ADENOIDECTOMY  2010      SECTION N/A 2019    Procedure:  SECTION;  Surgeon: Ankush Godinez MD;  Location: [facility]     COLONOSCOPY  2014     ESOPHAGOGASTRODUODENOSCOPY  2014     GI SURGERY  3/7/13    colonoscopy     LAPAROSCOPY DIAGNOSTIC (GENERAL)       TONSILLECTOMY  2010     Loda Teeth Extraction         Family History:    Family History   Problem Relation Age of Onset     Heart Disease Mother 50        SCAD/AMI 2nd to SCAD     Breast Cancer Mother 54     Family History Negative Father         interstitial lung disease.       Social History:  Marital Status:   [2]  Social History     Tobacco Use     Smoking status: Never     Smokeless tobacco: Never   Substance Use Topics     Alcohol use: Yes     Alcohol/week: 1.7 standard drinks     Types: 2 Cans of beer per week     Comment: social - weekly     Drug use: No        Medications:    acetaminophen (TYLENOL) 500 MG tablet  cetirizine (ZYRTEC) 10 MG tablet  cholestyramine (QUESTRAN) 4 g packet  docusate sodium (COLACE) 100 MG capsule  EPINEPHrine (ANY BX  "GENERIC EQUIV) 0.3 MG/0.3ML injection 2-pack  ondansetron (ZOFRAN ODT) 4 MG ODT tab  Prenatal Vit-Fe Fumarate-FA (PRENATAL VITAMINS PO)  triamcinolone (KENALOG) 0.1 % paste  trimethoprim-polymyxin b (POLYTRIM) 25448-6.1 UNIT/ML-% ophthalmic solution          Review of Systems    Physical Exam   BP: 113/75  Pulse: 82  Temp: 97.2  F (36.2  C)  Resp: 16  Height: 157.5 cm (5' 2\")  Weight: 64 kg (141 lb 1.5 oz)  SpO2: 99 %      Physical Exam    ED Course          Resumed patient care from Dr. Jones at time of shift exchange.  She recommended follow-up on the lab result.  Lab reviewed.  No significant acute abnormalities.  Slightly elevated white count.  No sign of infection anywhere.  Most likely demargination in the setting of pregnancy.  Patient was given 1 g Tylenol and 4 mg Zofran.  Symptomatically improved.  Patient stated she is feeling good enough to go home.  Broad differential diagnosis was considered including but not limited to subarachnoid hemorrhage, meningitis, encephalitis, seizure, tension headache, cluster headache, migraine headache, sepsis, CVA, etc.  No ominous sign.  No sign of any serious illness.  This most likely migraine.  Given symptomatically improved.  Advised to rest and stay well-hydrated.  Close follow-up with PCP.  Come back for any concern or any worsening symptoms.  Patient agrees with the plan.  Stable for discharge.    ED Course as of 10/12/22 2112   Wed Oct 12, 2022   1905 Signed out to oncoming provider with initial labs pending.     Procedures              Critical Care time:  none               Results for orders placed or performed during the hospital encounter of 10/12/22 (from the past 24 hour(s))   CBC with platelets differential    Narrative    The following orders were created for panel order CBC with platelets differential.  Procedure                               Abnormality         Status                     ---------                               -----------         " ------                     CBC with platelets and d...[[id number]]  Abnormal            Final result                 Please view results for these tests on the individual orders.   Comprehensive metabolic panel   Result Value Ref Range    Sodium 135 (L) 136 - 145 mmol/L    Potassium 3.8 3.4 - 5.3 mmol/L    Chloride 103 98 - 107 mmol/L    Carbon Dioxide (CO2) 22 22 - 29 mmol/L    Anion Gap 10 7 - 15 mmol/L    Urea Nitrogen 5.3 (L) 6.0 - 20.0 mg/dL    Creatinine 0.44 (L) 0.51 - 0.95 mg/dL    Calcium 8.8 8.6 - 10.0 mg/dL    Glucose 86 70 - 99 mg/dL    Alkaline Phosphatase 46 35 - 104 U/L    AST 15 10 - 35 U/L    ALT 9 (L) 10 - 35 U/L    Protein Total 6.6 6.4 - 8.3 g/dL    Albumin 4.0 3.5 - 5.2 g/dL    Bilirubin Total 0.3 <=1.2 mg/dL    GFR Estimate >90 >60 mL/min/1.73m2   Lipase   Result Value Ref Range    Lipase 26 13 - 60 U/L   Magnesium   Result Value Ref Range    Magnesium 1.9 1.7 - 2.3 mg/dL   CBC with platelets and differential   Result Value Ref Range    WBC Count 12.9 (H) 4.0 - 11.0 10e3/uL    RBC Count 4.20 3.80 - 5.20 10e6/uL    Hemoglobin 12.6 11.7 - 15.7 g/dL    Hematocrit 37.0 35.0 - 47.0 %    MCV 88 78 - 100 fL    MCH 30.0 26.5 - 33.0 pg    MCHC 34.1 31.5 - 36.5 g/dL    RDW 13.3 10.0 - 15.0 %    Platelet Count 222 150 - 450 10e3/uL    % Neutrophils 85 %    % Lymphocytes 10 %    % Monocytes 4 %    % Eosinophils 1 %    % Basophils 0 %    % Immature Granulocytes 0 %    NRBCs per 100 WBC 0 <1 /100    Absolute Neutrophils 10.9 (H) 1.6 - 8.3 10e3/uL    Absolute Lymphocytes 1.3 0.8 - 5.3 10e3/uL    Absolute Monocytes 0.5 0.0 - 1.3 10e3/uL    Absolute Eosinophils 0.1 0.0 - 0.7 10e3/uL    Absolute Basophils 0.0 0.0 - 0.2 10e3/uL    Absolute Immature Granulocytes 0.1 <=0.4 10e3/uL    Absolute NRBCs 0.0 10e3/uL   Extra Tube    Narrative    The following orders were created for panel order Extra Tube.  Procedure                               Abnormality         Status                     ---------                                -----------         ------                     Extra Blue Top Tube[[id number]]                              Final result               Extra Red Top Tube[[id number]]                               Final result               Extra Green Top (Lithium...[[id number]]                      Final result                 Please view results for these tests on the individual orders.   Extra Blue Top Tube   Result Value Ref Range    Hold Specimen JIC    Extra Red Top Tube   Result Value Ref Range    Hold Specimen JIC    Extra Green Top (Lithium Heparin) ON ICE   Result Value Ref Range    Hold Specimen JIC    Symptomatic; Yes; 10/12/2022 Influenza A/B & SARS-CoV2 (COVID-19) Virus PCR Multiplex Nasopharyngeal    Specimen: Nasopharyngeal; Swab   Result Value Ref Range    Influenza A PCR Negative Negative    Influenza B PCR Negative Negative    RSV PCR Negative Negative    SARS CoV2 PCR Negative Negative    Narrative    Testing was performed using the Xpert Xpress CoV2/Flu/RSV Assay on the Cepheid GeneXpert Instrument. This test should be ordered for the detection of SARS-CoV-2 and influenza viruses in individuals who meet clinical and/or epidemiological criteria. Test performance is unknown in asymptomatic patients. This test is for in vitro diagnostic use under the FDA EUA for laboratories certified under CLIA to perform high or moderate complexity testing. This test has not been FDA cleared or approved. A negative result does not rule out the presence of PCR inhibitors in the specimen or target RNA in concentration below the limit of detection for the assay. If only one viral target is positive but coinfection with multiple targets is suspected, the sample should be re-tested with another FDA cleared, approved, or authorized test, if coinfection would change clinical management. This test was validated by the [facility]. These laboratories are certified under the Clinical Laboratory Improvement  Amendments of 1988 (CLIA-88) as qualified to perform high complexity laboratory testing.   UA with Microscopic reflex to Culture    Specimen: Urine, Midstream   Result Value Ref Range    Color Urine Light Yellow Colorless, Straw, Light Yellow, Yellow    Appearance Urine Clear Clear    Glucose Urine Negative Negative mg/dL    Bilirubin Urine Negative Negative    Ketones Urine 10 (A) Negative mg/dL    Specific Gravity Urine 1.008 1.003 - 1.035    Blood Urine Negative Negative    pH Urine 7.0 4.7 - 8.0    Protein Albumin Urine Negative Negative mg/dL    Urobilinogen Urine Normal Normal, 2.0 mg/dL    Nitrite Urine Negative Negative    Leukocyte Esterase Urine Negative Negative    Mucus Urine Present (A) None Seen /LPF    RBC Urine <1 <=2 /HPF    WBC Urine <1 <=5 /HPF    Squamous Epithelials Urine 0 <=1 /HPF    Narrative    Urine Culture not indicated       Medications   0.9% sodium chloride BOLUS (0 mLs Intravenous Stopped 10/12/22 1951)   acetaminophen (TYLENOL) tablet 975 mg (975 mg Oral Given 10/12/22 2004)   ondansetron (ZOFRAN ODT) ODT tab 4 mg (4 mg Oral Given 10/12/22 1951)       Assessments & Plan (with Medical Decision Making)     I have reviewed the nursing notes.    I have reviewed the findings, diagnosis, plan and need for follow up with the patient.       New Prescriptions    No medications on file       Final diagnoses:   Acute nonintractable headache, unspecified headache type   Nausea and vomiting, unspecified vomiting type   Migraine without status migrainosus, not intractable, unspecified migraine type       10/12/2022   [facility]     Mark Estes MD  10/12/22 2112

## 2022-10-13 NOTE — ED NOTES
"Pt states her nausea is \"about the same\" and  her headache has now increased to 4-5/10 at this time. 3/4's of NS infused at this time.  "

## 2022-10-13 NOTE — DISCHARGE INSTRUCTIONS
Rest and stable hydrated  Close plan with PCP  Come back for any concern or any worsening symptoms

## 2022-10-18 ENCOUNTER — PRENATAL OFFICE VISIT (OUTPATIENT)
Dept: OBGYN | Facility: OTHER | Age: 32
End: 2022-10-18
Attending: OBSTETRICS & GYNECOLOGY
Payer: COMMERCIAL

## 2022-10-18 VITALS
OXYGEN SATURATION: 99 % | WEIGHT: 140 LBS | SYSTOLIC BLOOD PRESSURE: 110 MMHG | HEIGHT: 62 IN | BODY MASS INDEX: 25.76 KG/M2 | HEART RATE: 105 BPM | DIASTOLIC BLOOD PRESSURE: 50 MMHG

## 2022-10-18 DIAGNOSIS — O34.219 PREVIOUS CESAREAN DELIVERY, ANTEPARTUM CONDITION OR COMPLICATION: ICD-10-CM

## 2022-10-18 DIAGNOSIS — O21.9 VOMITING AFFECTING PREGNANCY: Primary | ICD-10-CM

## 2022-10-18 DIAGNOSIS — Z23 NEED FOR PROPHYLACTIC VACCINATION AND INOCULATION AGAINST INFLUENZA: ICD-10-CM

## 2022-10-18 DIAGNOSIS — Z34.82 ENCOUNTER FOR SUPERVISION OF OTHER NORMAL PREGNANCY IN SECOND TRIMESTER: ICD-10-CM

## 2022-10-18 PROCEDURE — 90686 IIV4 VACC NO PRSV 0.5 ML IM: CPT | Performed by: OBSTETRICS & GYNECOLOGY

## 2022-10-18 PROCEDURE — 99207 PR PRENATAL VISIT: CPT | Performed by: OBSTETRICS & GYNECOLOGY

## 2022-10-18 PROCEDURE — 90471 IMMUNIZATION ADMIN: CPT | Performed by: OBSTETRICS & GYNECOLOGY

## 2022-10-18 RX ORDER — METOCLOPRAMIDE 10 MG/1
10 TABLET ORAL 3 TIMES DAILY PRN
Qty: 30 TABLET | Refills: 1 | Status: SHIPPED | OUTPATIENT
Start: 2022-10-18 | End: 2023-08-16

## 2022-10-18 ASSESSMENT — PAIN SCALES - GENERAL: PAINLEVEL: MILD PAIN (2)

## 2022-10-18 NOTE — NURSING NOTE
"Chief Complaint   Patient presents with     Prenatal Care     22 weeks 6 days       Initial /50 (BP Location: Left arm, Patient Position: Chair, Cuff Size: Adult Regular)   Pulse 105   Ht 1.575 m (5' 2\")   Wt 63.5 kg (140 lb)   LMP 05/11/2022   SpO2 99%   BMI 25.61 kg/m   Estimated body mass index is 25.61 kg/m  as calculated from the following:    Height as of this encounter: 1.575 m (5' 2\").    Weight as of this encounter: 63.5 kg (140 lb).  Medication Reconciliation: complete  RODOLFO GIBBS LPN    "

## 2022-10-18 NOTE — PROGRESS NOTES
Doing well.  No concerns today.  Denies PTL sx, vb, lof  Recent migraine resolved.  Reglan Rx for future recurrences.   Flu shot done  Reminded of upcoming labs including 1 hour GTT  Reviewed recent US-no concerns with anatomical survey.  Return to clinic in 4 weeks    Ankush Godinez MD  10/18/2022

## 2022-11-17 DIAGNOSIS — Z34.82 NORMAL PREGNANCY IN MULTIGRAVIDA IN SECOND TRIMESTER: Primary | ICD-10-CM

## 2022-11-18 ENCOUNTER — LAB (OUTPATIENT)
Dept: LAB | Facility: OTHER | Age: 32
End: 2022-11-18
Attending: NURSE PRACTITIONER
Payer: COMMERCIAL

## 2022-11-18 ENCOUNTER — PRENATAL OFFICE VISIT (OUTPATIENT)
Dept: OBGYN | Facility: OTHER | Age: 32
End: 2022-11-18
Attending: NURSE PRACTITIONER
Payer: COMMERCIAL

## 2022-11-18 VITALS
WEIGHT: 146.7 LBS | BODY MASS INDEX: 26.83 KG/M2 | SYSTOLIC BLOOD PRESSURE: 105 MMHG | OXYGEN SATURATION: 97 % | HEART RATE: 108 BPM | DIASTOLIC BLOOD PRESSURE: 50 MMHG

## 2022-11-18 DIAGNOSIS — Z34.82 NORMAL PREGNANCY IN MULTIGRAVIDA IN SECOND TRIMESTER: Primary | ICD-10-CM

## 2022-11-18 DIAGNOSIS — Z23 NEED FOR VACCINATION: ICD-10-CM

## 2022-11-18 DIAGNOSIS — Z34.82 NORMAL PREGNANCY IN MULTIGRAVIDA IN SECOND TRIMESTER: ICD-10-CM

## 2022-11-18 LAB
ANTIBODY SCREEN: NEGATIVE
ERYTHROCYTE [DISTWIDTH] IN BLOOD BY AUTOMATED COUNT: 12.9 % (ref 10–15)
GLUCOSE 1H P 50 G GLC PO SERPL-MCNC: 128 MG/DL (ref 70–129)
HCT VFR BLD AUTO: 34.8 % (ref 35–47)
HGB BLD-MCNC: 12.1 G/DL (ref 11.7–15.7)
MCH RBC QN AUTO: 31 PG (ref 26.5–33)
MCHC RBC AUTO-ENTMCNC: 34.8 G/DL (ref 31.5–36.5)
MCV RBC AUTO: 89 FL (ref 78–100)
PLATELET # BLD AUTO: 189 10E3/UL (ref 150–450)
RBC # BLD AUTO: 3.9 10E6/UL (ref 3.8–5.2)
SPECIMEN EXPIRATION DATE: NORMAL
WBC # BLD AUTO: 8.2 10E3/UL (ref 4–11)

## 2022-11-18 PROCEDURE — 85027 COMPLETE CBC AUTOMATED: CPT

## 2022-11-18 PROCEDURE — 36415 COLL VENOUS BLD VENIPUNCTURE: CPT

## 2022-11-18 PROCEDURE — 99207 PR PRENATAL VISIT: CPT | Performed by: NURSE PRACTITIONER

## 2022-11-18 PROCEDURE — 90471 IMMUNIZATION ADMIN: CPT | Performed by: NURSE PRACTITIONER

## 2022-11-18 PROCEDURE — 86780 TREPONEMA PALLIDUM: CPT

## 2022-11-18 PROCEDURE — 90715 TDAP VACCINE 7 YRS/> IM: CPT | Performed by: NURSE PRACTITIONER

## 2022-11-18 PROCEDURE — 82950 GLUCOSE TEST: CPT

## 2022-11-18 PROCEDURE — 86850 RBC ANTIBODY SCREEN: CPT

## 2022-11-18 ASSESSMENT — PAIN SCALES - GENERAL: PAINLEVEL: NO PAIN (0)

## 2022-11-18 ASSESSMENT — PATIENT HEALTH QUESTIONNAIRE - PHQ9: SUM OF ALL RESPONSES TO PHQ QUESTIONS 1-9: 4

## 2022-11-18 NOTE — PROGRESS NOTES
Denies concerns.  28 week labs and Tdap today.  Discussed PTL and kick counts.  Third trimester changes and comfort measures reviewed.  Return in 2 weeks.

## 2022-11-19 LAB — T PALLIDUM AB SER QL: NONREACTIVE

## 2022-11-23 ENCOUNTER — HOSPITAL ENCOUNTER (OUTPATIENT)
Facility: HOSPITAL | Age: 32
End: 2022-11-23
Admitting: OBSTETRICS & GYNECOLOGY
Payer: COMMERCIAL

## 2022-11-23 ENCOUNTER — HOSPITAL ENCOUNTER (OUTPATIENT)
Facility: HOSPITAL | Age: 32
Discharge: HOME | End: 2022-11-23
Attending: OBSTETRICS & GYNECOLOGY | Admitting: OBSTETRICS & GYNECOLOGY
Payer: COMMERCIAL

## 2022-11-23 VITALS
RESPIRATION RATE: 16 BRPM | SYSTOLIC BLOOD PRESSURE: 113 MMHG | OXYGEN SATURATION: 99 % | TEMPERATURE: 97.8 F | DIASTOLIC BLOOD PRESSURE: 75 MMHG | WEIGHT: 144 LBS | HEIGHT: 62 IN | BODY MASS INDEX: 26.5 KG/M2

## 2022-11-23 PROCEDURE — G0463 HOSPITAL OUTPT CLINIC VISIT: HCPCS | Mod: 25

## 2022-11-23 PROCEDURE — 59025 FETAL NON-STRESS TEST: CPT

## 2022-11-23 PROCEDURE — 59025 FETAL NON-STRESS TEST: CPT | Mod: 26 | Performed by: OBSTETRICS & GYNECOLOGY

## 2022-11-23 RX ORDER — LIDOCAINE 40 MG/G
CREAM TOPICAL
Status: DISCONTINUED | OUTPATIENT
Start: 2022-11-23 | End: 2022-11-23 | Stop reason: HOSPADM

## 2022-11-23 NOTE — PLAN OF CARE
OB Triage Note  Tiffanie Alejo  MRN: [id number]  Gestational Age: 28w0d      Tiffanie Alejo presents for decreased fetal movement. Pt. Stated she has not felt baby move since last night, stated she used doppler at home, baby's heart rate sounded normal and then went down pausing for a slight period of time and then went back up.     Denies contractions, bleeding, and LOF.     Dr. Godinez notified of arrival and condition.  Oriented patient to surroundings. Call light within reach.     FHT: 135   NST Start Time:1710  NST Stop Time:1730  NST: Reactive.  Uterine Assessment:No contractions   Plan:  -Initial NST, then fetal/uterine monitoring per MD/patient plan.  -Nursing education on plan of care provided.      Verified with second RN: Shani Armas RN     Comments:  Ok to discharge per MD.       Radha Knott, RN

## 2022-11-24 NOTE — DISCHARGE INSTRUCTIONS
Discharge Instructions for Undelivered Patients    Diet:  * Drink 8 to 12 glasses of liquids (milk, juice, water) every day  * You may eat meals and snacks.    Activity:  * Count fetal kicks every day.  * Call your doctor if your baby is moving less than usual.    Call your provider if you notice:  * Swelling in your face or increased swelling in your hands or legs.  * Headaches that are not relieved by Tylenol (acetaminophen).  * Changes in your vision (blurring; seeing spots or stars).  * Nausea (sick to your stomach) and vomiting (throwing up).  * Weight gain of 5 pounds per week.  * Heartburn that doesn't go away.  * Signs of bladder infection: Pain when you urinate (use the toilet), needing to go more often or more urgently.  * The bag of de la torre (membrane) breaks, or you notice leaking in your underwear.  * Bright red blood in your underwear.  * Abdominal (lower belly) or stomach pain.  * Second (plus) baby: Contractions (tightenings) less than 10 minutes apart and getting stronger.  * Increase or change in vaginal discharge (note the color and amount).        Kick Counts  It s normal to worry about your baby s health. One way you can know your baby s doing well is to record the baby s movements once a day. This is called a kick count.   Remember to take your kick count records to all your appointments with yourhealthcare provider.  How to count kicks    Time how long it takes you to feel 10 kicks, flutters, swishes, or rolls. Ideally, you want to feel at least 10 movements in 2 hours. You will likelyfeel 10 movements in less time than that.  Starting at 28 weeks, count your baby's movements daily. Follow your healthcareprovider's instructions for kick counting. Here are tips for counting kicks:  Choose a time when the baby is active, such as after a meal.   Sit comfortably or lie on your side.   The first time the baby moves, write down the time.   Count each movement until the baby has moved  10 times. This  can take from 20 minutes to 2 hours.   If you haven't felt 10 kicks by the end of the second hour, wait a few hours. Then try again.  Try to do it at the same time each day.  When to call your healthcare provider  Call your healthcare provider  right away if:  You do a couple sets of kick counts during the day and your baby moves fewer than 10 times in 2 hours.  Your baby moves much less often than on the days before.  You haven't felt your baby move all day.  [employer] last reviewed this educational content on8/1/2020 2000-2022 [employer]. All rights reserved. This information is not intended as a substitute for professional medical care. Always follow yourhealthcare professional's instructions.            [facility]: [phone]

## 2022-11-24 NOTE — PLAN OF CARE
"Tiffanie Alejo is a 32 year old  and 28w0d patient came in complaining of Decreased Fetal Movement    Patient Active Problem List   Diagnosis     Normal pregnancy in multigravida in second trimester       Pt discharged to home at 6:25 PM and encouraged to rest and drink plenty of fluids.  Pt told to call/return if bleeding more than spotting, water breaks, contractions 3-5 minutes apart that she has to breath through.     Nursing education on counting fetal kick counts and discharge instructions (see AVS). Self-management instructions reviewed. AVS given and signed. All questions answered and patient verbalizes understanding.    /75 (BP Location: Right arm, Patient Position: Semi-Diamond's, Cuff Size: Adult Regular)   Temp 97.8  F (36.6  C) (Oral)   Resp 16   Ht 1.575 m (5' 2\")   Wt 65.3 kg (144 lb)   LMP 2022   SpO2 99%   BMI 26.34 kg/m      Cervical status: not examined  Fetal Assessment: Reactive    Discharge support:  Family/Friend Support    OB History    Para Term  AB Living   2 1 1 0 0 1   SAB IAB Ectopic Multiple Live Births   0 0 0 0 1      # Outcome Date GA Lbr Eugenio/2nd Weight Sex Delivery Anes PTL Lv   2 Current            1 Term 19 38w3d  3.34 kg (7 lb 5.8 oz) M  Spinal N DOMINGA      Name: Xander      Apgar1: 9  Apgar5: 9       Radha Knott RN      "

## 2022-11-29 NOTE — PROGRESS NOTES
Fetal Non-Stress Test Results    NST Ordered By: Dr. Godinez  NST Medical Indication: Decreased fetal movement    NST Start & Stop Times  NST Start Time: 1710  NST Stop Time: 1730                            NST Results  Fetus A   Baseline Rate: Normal  Accelerations: Present  Decelerations: None  Interpretation: reactive

## 2022-12-02 ENCOUNTER — PRENATAL OFFICE VISIT (OUTPATIENT)
Dept: OBGYN | Facility: OTHER | Age: 32
End: 2022-12-02
Attending: OBSTETRICS & GYNECOLOGY
Payer: COMMERCIAL

## 2022-12-02 VITALS — BODY MASS INDEX: 27.8 KG/M2 | WEIGHT: 152 LBS | SYSTOLIC BLOOD PRESSURE: 100 MMHG | DIASTOLIC BLOOD PRESSURE: 60 MMHG

## 2022-12-02 DIAGNOSIS — O36.8190 DECREASED FETAL MOVEMENT AFFECTING MANAGEMENT OF PREGNANCY, ANTEPARTUM, SINGLE OR UNSPECIFIED FETUS: ICD-10-CM

## 2022-12-02 PROCEDURE — 76805 OB US >/= 14 WKS SNGL FETUS: CPT | Performed by: OBSTETRICS & GYNECOLOGY

## 2022-12-02 PROCEDURE — 99207 PR PRENATAL VISIT: CPT | Performed by: OBSTETRICS & GYNECOLOGY

## 2022-12-02 RX ORDER — MULTIVITAMIN WITH IRON
1 TABLET ORAL DAILY
Status: ON HOLD | COMMUNITY
End: 2023-02-11

## 2022-12-02 ASSESSMENT — PAIN SCALES - GENERAL: PAINLEVEL: NO PAIN (0)

## 2022-12-02 NOTE — PROGRESS NOTES
Doing well.  No concerns today.  Decreased fetal movement compared to prior pregnancy.  US:  EFW 1477g, AUA: 29 4/7,  Ant placenta.  Nml movement/fluid.  Vtx  Discussed kick counts and fetal movement.  RTC in 2 weeks  Denies PTL sx, vb, lof  Ankush Godinez MD  12/2/2022

## 2022-12-15 ENCOUNTER — PREP FOR PROCEDURE (OUTPATIENT)
Dept: OBGYN | Facility: OTHER | Age: 32
End: 2022-12-15

## 2022-12-15 DIAGNOSIS — Z98.891 HISTORY OF CESAREAN SECTION: Primary | ICD-10-CM

## 2022-12-16 ENCOUNTER — PRENATAL OFFICE VISIT (OUTPATIENT)
Dept: OBGYN | Facility: OTHER | Age: 32
End: 2022-12-16
Attending: OBSTETRICS & GYNECOLOGY
Payer: COMMERCIAL

## 2022-12-16 VITALS
WEIGHT: 155.4 LBS | RESPIRATION RATE: 16 BRPM | SYSTOLIC BLOOD PRESSURE: 98 MMHG | OXYGEN SATURATION: 97 % | DIASTOLIC BLOOD PRESSURE: 60 MMHG | HEART RATE: 93 BPM | HEIGHT: 62 IN | BODY MASS INDEX: 28.6 KG/M2

## 2022-12-16 DIAGNOSIS — Z98.891 HISTORY OF CESAREAN SECTION: ICD-10-CM

## 2022-12-16 DIAGNOSIS — O98.513 COVID-19 AFFECTING PREGNANCY IN THIRD TRIMESTER: Primary | ICD-10-CM

## 2022-12-16 DIAGNOSIS — U07.1 COVID-19 AFFECTING PREGNANCY IN THIRD TRIMESTER: Primary | ICD-10-CM

## 2022-12-16 PROCEDURE — 99207 PR PRENATAL VISIT: CPT | Performed by: OBSTETRICS & GYNECOLOGY

## 2022-12-16 ASSESSMENT — PAIN SCALES - GENERAL: PAINLEVEL: NO PAIN (0)

## 2022-12-16 NOTE — PROGRESS NOTES
Doing well.  No concerns today.  Recent Covid recovering well.  US growth ordered  Discussed kick counts and fetal movement.  RTC in 2 weeks  Denies PTL sx, vb, jacki Godinez MD  12/16/2022

## 2022-12-28 ENCOUNTER — HOSPITAL ENCOUNTER (OUTPATIENT)
Dept: ULTRASOUND IMAGING | Facility: HOSPITAL | Age: 32
Discharge: HOME | End: 2022-12-28
Attending: OBSTETRICS & GYNECOLOGY | Admitting: OBSTETRICS & GYNECOLOGY
Payer: COMMERCIAL

## 2022-12-28 DIAGNOSIS — U07.1 COVID-19 AFFECTING PREGNANCY IN THIRD TRIMESTER: ICD-10-CM

## 2022-12-28 DIAGNOSIS — O98.513 COVID-19 AFFECTING PREGNANCY IN THIRD TRIMESTER: ICD-10-CM

## 2022-12-28 PROCEDURE — 76816 OB US FOLLOW-UP PER FETUS: CPT

## 2023-01-03 ENCOUNTER — PRENATAL OFFICE VISIT (OUTPATIENT)
Dept: OBGYN | Facility: OTHER | Age: 33
End: 2023-01-03
Attending: OBSTETRICS & GYNECOLOGY
Payer: COMMERCIAL

## 2023-01-03 VITALS
DIASTOLIC BLOOD PRESSURE: 58 MMHG | WEIGHT: 156 LBS | OXYGEN SATURATION: 97 % | BODY MASS INDEX: 28.71 KG/M2 | SYSTOLIC BLOOD PRESSURE: 98 MMHG | HEIGHT: 62 IN | HEART RATE: 114 BPM

## 2023-01-03 DIAGNOSIS — Z34.83 SUPERVISION OF NORMAL INTRAUTERINE PREGNANCY IN MULTIGRAVIDA IN THIRD TRIMESTER: ICD-10-CM

## 2023-01-03 DIAGNOSIS — Z98.891 HISTORY OF CESAREAN SECTION: Primary | ICD-10-CM

## 2023-01-03 PROCEDURE — 99207 PR PRENATAL VISIT: CPT | Performed by: OBSTETRICS & GYNECOLOGY

## 2023-01-03 ASSESSMENT — PAIN SCALES - GENERAL: PAINLEVEL: MODERATE PAIN (4)

## 2023-01-03 NOTE — PROGRESS NOTES
Doing well.  No concerns today except recent L hip pain. PT referral. .  Discussed kick counts and fetal movement.  RTC in 2 weeks  Denies PTL vanessa, jordana, jacki Godinez MD  1/3/2023

## 2023-01-04 ENCOUNTER — MYC MEDICAL ADVICE (OUTPATIENT)
Dept: OBGYN | Facility: OTHER | Age: 33
End: 2023-01-04

## 2023-01-04 DIAGNOSIS — Z34.83 NORMAL PREGNANCY IN MULTIGRAVIDA IN THIRD TRIMESTER: ICD-10-CM

## 2023-01-04 DIAGNOSIS — M25.552 LEFT HIP PAIN: Primary | ICD-10-CM

## 2023-01-04 NOTE — TELEPHONE ENCOUNTER
Patient sent [employer] message with the request for a PT referral for Left Hip pain. Referral placed. You will see the cosign in your inbasket. Thank you!

## 2023-01-05 ENCOUNTER — HOSPITAL ENCOUNTER (OUTPATIENT)
Facility: HOSPITAL | Age: 33
Discharge: HOME | End: 2023-01-05
Attending: OBSTETRICS & GYNECOLOGY | Admitting: OBSTETRICS & GYNECOLOGY
Payer: COMMERCIAL

## 2023-01-05 ENCOUNTER — NURSE TRIAGE (OUTPATIENT)
Dept: NURSING | Facility: CLINIC | Age: 33
End: 2023-01-05

## 2023-01-05 VITALS
DIASTOLIC BLOOD PRESSURE: 72 MMHG | TEMPERATURE: 98.1 F | WEIGHT: 150 LBS | BODY MASS INDEX: 27.6 KG/M2 | SYSTOLIC BLOOD PRESSURE: 112 MMHG | HEART RATE: 84 BPM | HEIGHT: 62 IN | OXYGEN SATURATION: 97 % | RESPIRATION RATE: 16 BRPM

## 2023-01-05 LAB
ALBUMIN UR-MCNC: NEGATIVE MG/DL
APPEARANCE UR: CLEAR
BACTERIA #/AREA URNS HPF: ABNORMAL /HPF
BILIRUB UR QL STRIP: NEGATIVE
COLOR UR AUTO: ABNORMAL
GLUCOSE UR STRIP-MCNC: NEGATIVE MG/DL
HGB UR QL STRIP: NEGATIVE
KETONES UR STRIP-MCNC: NEGATIVE MG/DL
LEUKOCYTE ESTERASE UR QL STRIP: ABNORMAL
MUCOUS THREADS #/AREA URNS LPF: PRESENT /LPF
NITRATE UR QL: NEGATIVE
PH UR STRIP: 6.5 [PH] (ref 4.7–8)
RBC URINE: 1 /HPF
SP GR UR STRIP: 1.01 (ref 1–1.03)
SQUAMOUS EPITHELIAL: 3 /HPF
UROBILINOGEN UR STRIP-MCNC: NORMAL MG/DL
WBC URINE: 1 /HPF

## 2023-01-05 PROCEDURE — 81001 URINALYSIS AUTO W/SCOPE: CPT | Performed by: OBSTETRICS & GYNECOLOGY

## 2023-01-05 PROCEDURE — 59025 FETAL NON-STRESS TEST: CPT | Mod: 26 | Performed by: OBSTETRICS & GYNECOLOGY

## 2023-01-05 PROCEDURE — 87086 URINE CULTURE/COLONY COUNT: CPT | Performed by: OBSTETRICS & GYNECOLOGY

## 2023-01-05 PROCEDURE — 59025 FETAL NON-STRESS TEST: CPT

## 2023-01-05 RX ORDER — LIDOCAINE 40 MG/G
CREAM TOPICAL
Status: DISCONTINUED | OUTPATIENT
Start: 2023-01-05 | End: 2023-01-06 | Stop reason: HOSPADM

## 2023-01-05 RX ORDER — CALCIUM CARBONATE 500 MG/1
2 TABLET, CHEWABLE ORAL PRN
COMMUNITY
End: 2023-01-26

## 2023-01-05 ASSESSMENT — ACTIVITIES OF DAILY LIVING (ADL): ADLS_ACUITY_SCORE: 35

## 2023-01-06 NOTE — PLAN OF CARE
OB Triage Note  Tiffanie Alejo  MRN: [id number]  Gestational Age: 34w1d      Tiffanie Alejo presents for consistent abdominal pain lasting over 2 hours, not helped with TUMS     Rates pain 3/10. Patient states no LOF, no bleeding. Patient states she has been having contractions ranging from 7 minutes to one hour apart. Patient states the contractions are not painful, stating she only feels the tightening in her abdomen. Patient reports history of placental abruption in 2019 with her first child.     Dr. Godinez notified of arrival and condition.  Oriented patient to surroundings. Call light within reach.     FHT: 130  NST Start Time: 5  NST Stop Time:   NST: Reactive.  Uterine Assessment:Contractions: Palpate soft, 7 minutes apart    Plan:  -Initial NST, then fetal/uterine monitoring per MD/patient plan.  -MD plan to monitor patient and wait for UA results.     Verified with second RN: Nikki Cortez RN

## 2023-01-06 NOTE — DISCHARGE INSTRUCTIONS

## 2023-01-06 NOTE — PLAN OF CARE
Tiffanie Alejo    NST:  reactive  Start: 2125  Stop: 2145    Physician: Dr. Godinez  Reason For Test: Presented to triage with abdominal pain  Estimated Date of Delivery: Feb 15, 2023   Gestational Age: 34w1d     Verified with second RN: Nikki Cortez, RN

## 2023-01-06 NOTE — TELEPHONE ENCOUNTER
"OB Triage Call      Is patient's OB/Midwife with the [facility] or [facility]? [facility]- Proceed with triage     Reason for call: Stomach/abdomen upset/pain.    Assessment: Whole front of stomache/abdomen hurts, moderate pain for 2.5 hours.    Plan: To LD    Patient plans to deliver at [facility]    Patient's primary OB Provider is Dr. Godinez.      Per protocol recommendations Patient to be evaluated in L&D. Patient's primary OB is [facility].  Labor and delivery at [facility] ([phone]) notified of patient's pending arrival.  Report given to April.      Is patient's delivering hospital on divert? No      34w1d    Estimated Date of Delivery: Feb 15, 2023        OB History    Para Term  AB Living   2 1 1 0 0 1   SAB IAB Ectopic Multiple Live Births   0 0 0 0 1      # Outcome Date GA Lbr Eugenio/2nd Weight Sex Delivery Anes PTL Lv   2 Current            1 Term 19 38w3d  3.34 kg (7 lb 5.8 oz) M  Spinal N DOMINGA      Name: Xander      Apgar1: 9  Apgar5: 9       Lab Results   Component Value Date    GBS Negative 2019          Bonny Norman RN 23 8:21 PM  [facility]    Reason for Disposition    MODERATE-SEVERE abdominal pain (e.g., interferes with normal activities, awakens from sleep)    Additional Information    Negative: Passed out (i.e., lost consciousness, collapsed and was not responding)    Negative: Shock suspected (e.g., cold/pale/clammy skin, too weak to stand, low BP, rapid pulse)    Negative: Difficult to awaken or acting confused (e.g., disoriented, slurred speech)    Negative: [1] SEVERE abdominal pain (e.g., excruciating) AND [2] constant AND [3] present > 1 hour    Negative: SEVERE vaginal bleeding (e.g., continuous red blood from vagina, large blood clots)    Negative: Sounds like a life-threatening emergency to the triager    Negative: [1] Vomiting AND [2] contains red blood or black (\"coffee ground\") material  (Exception: few red " streaks in vomit that only happened once)    Negative: [1] SEVERE headache AND [2] not relieved with acetaminophen (e.g., Tylenol)    Protocols used: PREGNANCY - ABDOMINAL PAIN GREATER THAN 20 WEEKS EGA-A-AH

## 2023-01-06 NOTE — PLAN OF CARE
"Tiffanie Alejo is a 32 year old  and 34w1d patient came in complaining of Abdominal Pain and Back Pain    Patient states she can feel her contractions \"tightening\" but rates them 0/10 for pain. Patient also reports she has periods of frequent painless contractions at home that stop for days at a time. Patient reports \"stomach ache feeling\" has gotten better since arriving at the hospital today.     MD acknowledged UA results. MD request for RN to perform SVE. Patient's cervix posterior, long, closed.     Telephone orders from Dr. Godinez to discharge patient with plan to call clinic in morning if symptoms persist.     Patient Active Problem List   Diagnosis     Normal pregnancy in multigravida in second trimester       Pt discharged to home at 10:54 PM and encouraged to rest and drink plenty of fluids.  Pt told to call/return if bleeding more than spotting, water breaks, contractions 3-5 minutes apart that she has to breath through.     Nursing education on signs of placental abruption, signs of labor, and when to return to the hospital provided. Self-management instructions reviewed. AVS given and signed. All questions answered and patient verbalizes understanding.    /72 (BP Location: Right arm, Patient Position: Semi-Diamond's, Cuff Size: Adult Regular)   Pulse 84   Temp 98.1  F (36.7  C) (Oral)   Resp 16   Ht 1.575 m (5' 2\")   Wt 68 kg (150 lb)   LMP 2022   SpO2 97%   BMI 27.44 kg/m      Cervical status: posterior and closed  Fetal Assessment: Reactive    Discharge support:  Family/Friend Support    OB History    Para Term  AB Living   2 1 1 0 0 1   SAB IAB Ectopic Multiple Live Births   0 0 0 0 1      # Outcome Date GA Lbr Eugenio/2nd Weight Sex Delivery Anes PTL Lv   2 Current            1 Term 19 38w3d  3.34 kg (7 lb 5.8 oz) M  Spinal N DOMINGA      Name: Xander      Apgar1: 9  Apgar5: 9       Julienne Cortez RN      "

## 2023-01-08 LAB — BACTERIA UR CULT: NORMAL

## 2023-01-09 ENCOUNTER — HOSPITAL ENCOUNTER (OUTPATIENT)
Dept: PHYSICAL THERAPY | Facility: HOSPITAL | Age: 33
Setting detail: THERAPIES SERIES
Discharge: HOME | End: 2023-01-09
Attending: OBSTETRICS & GYNECOLOGY
Payer: COMMERCIAL

## 2023-01-09 DIAGNOSIS — M25.552 LEFT HIP PAIN: ICD-10-CM

## 2023-01-09 DIAGNOSIS — Z34.83 NORMAL PREGNANCY IN MULTIGRAVIDA IN THIRD TRIMESTER: ICD-10-CM

## 2023-01-09 PROCEDURE — 97140 MANUAL THERAPY 1/> REGIONS: CPT | Mod: GP

## 2023-01-09 PROCEDURE — 97110 THERAPEUTIC EXERCISES: CPT | Mod: GP

## 2023-01-09 PROCEDURE — 97161 PT EVAL LOW COMPLEX 20 MIN: CPT | Mod: GP

## 2023-01-09 ASSESSMENT — ACTIVITIES OF DAILY LIVING (ADL)
GETTING_INTO_AND_OUT_OF_A_BATHTUB: EXTREME DIFFICULTY
HOS_ADL_SCORE(%): 33.82
WALKING_APPROXIMATELY_10_MINUTES: UNABLE TO DO
WALKING_DOWN_STEEP_HILLS: EXTREME DIFFICULTY
STEPPING_UP_AND_DOWN_CURBS: EXTREME DIFFICULTY
SITTING_FOR_15_MINUTES: NO DIFFICULTY AT ALL
PUTTING_ON_SOCKS_AND_SHOES: MODERATE DIFFICULTY
DEEP_SQUATTING: EXTREME DIFFICULTY
WALKING_UP_STEEP_HILLS: EXTREME DIFFICULTY
ROLLING_OVER_IN_BED: NO DIFFICULTY AT ALL
HOS_ADL_COUNT: 17
GOING_UP_1_FLIGHT_OF_STAIRS: EXTREME DIFFICULTY
TWISTING/PIVOTING_ON_INVOLVED_LEG: NO DIFFICULTY AT ALL
HEAVY_WORK: EXTREME DIFFICULTY
LIGHT_TO_MODERATE_WORK: EXTREME DIFFICULTY
HOS_ADL_HIGHEST_POTENTIAL_SCORE: 68
GETTING_INTO_AND_OUT_OF_AN_AVERAGE_CAR: MODERATE DIFFICULTY
RECREATIONAL_ACTIVITIES: EXTREME DIFFICULTY
GOING_DOWN_1_FLIGHT_OF_STAIRS: EXTREME DIFFICULTY
WALKING_15_MINUTES_OR_GREATER: UNABLE TO DO
HOS_ADL_ITEM_SCORE_TOTAL: 23
WALKING_INITIALLY: UNABLE TO DO
STANDING_FOR_15_MINUTES: SLIGHT DIFFICULTY

## 2023-01-09 NOTE — PROGRESS NOTES
01/09/23 1300   General Information   Type of Visit Initial OP Ortho PT Evaluation   Start of Care Date 01/09/23   Referring Physician Dr. Godinez   Patient/Family Goals Statement Less pain   Orders Evaluate and Treat   Date of Order 01/04/23   Medical Diagnosis L hip pain, Normal pregnancy in multigravida in third trimester   Surgical/Medical history reviewed Yes   Precautions/Limitations no known precautions/limitations   Body Part(s)   Body Part(s) Lumbar Spine/SI   Presentation and Etiology   Pertinent history of current problem (include personal factors and/or comorbidities that impact the POC) Pt presents to PT c c/o L hip pain from insidious onset last week. Pain was so severe that she needed a cane to walk for a day. She went to see chiro and it did help. Pain in L lateral hip with ambulation. No pain at rest. Pt is 34 weeks pregnant   Impairments A. Pain;B. Decreased WB tolerance;H. Impaired gait   Functional Limitations perform activities of daily living   Symptom Location L hip pain   How/Where did it occur From insidious onset   Onset date of current episode/exacerbation 01/02/23   Chronicity New   Pain rating (0-10 point scale) Best (/10);Worst (/10)   Best (/10) 1   Worst (/10) 5   Pain quality E. Shooting   Frequency of pain/symptoms B. Intermittent   Pain/symptoms exacerbated by B. Walking   Pain/symptoms eased by A. Sitting   Progression of symptoms since onset: Improved   Current Level of Function   Patient role/employment history A. Employed   Employment Comments    Fall Risk Screen   Fall screen completed by PT   Have you fallen 2 or more times in the past year? No   Is patient a fall risk? No   Lumbar Spine/SI Objective Findings   Gait/Locomotion Wide ROC   Balance/Proprioception (Single Leg Stance) Unable to assess   Hamstring Flexibility Good bilaterally   Hip Flexor Flexibility Good bilaterally   Extension ROM WFL   Right Side Bending ROM WFL   Left Side Bending ROM WFL   Pelvic  Screen ASIS elevated on the left, LE 1/2 inch shorter on L   Hip Screen Negative hip thrust   Transversus Abdominus Strength (Nik Leg Lowering-deg) Unable due to pregnancy, weakness observed c functional activities   Hip Abduction Strength 4/5   Hip Extension Strength 4/5   Palpation Tender to ITB on L   Planned Therapy Interventions   Planned Therapy Interventions gait training;joint mobilization;manual therapy;neuromuscular re-education;ROM;strengthening;stretching   Planned Modality Interventions   Planned Modality Interventions Ultrasound;Hot packs   Clinical Impression   Criteria for Skilled Therapeutic Interventions Met yes, treatment indicated   PT Diagnosis L hip pain   Influenced by the following impairments Pelvic dysfunction, abdominal weakness d/t pregnancy   Functional limitations due to impairments Decreased tolerance for functional activities due to hip pain   Clinical Presentation Stable/Uncomplicated   Clinical Presentation Rationale Clinical judgement   Clinical Decision Making (Complexity) Low complexity   Therapy Frequency   (1-2x/week)   Predicted Duration of Therapy Intervention (days/wks) up to 8 weeks   Risk & Benefits of therapy have been explained Yes   Patient, Family & other staff in agreement with plan of care Yes   Clinical Impression Comments Pt demonstrates with L hip pain in pregnancy from insidious onset. Pt would benefit from skilled PT services to decrease pain and improve function and decrease risk for impaired mobility during duration of pregnancy   ORTHO GOALS   PT Ortho Eval Goals 1;2;3   Ortho Goal 1   Goal Identifier STG 1   Goal Description Pt will demonstrate knowledge/understanding of HEP and report daily compliance   Target Date 01/23/23   Ortho Goal 2   Goal Identifier LTG 1   Goal Description Pt will ambulate community distance without being limited by hip pain   Target Date 03/06/23   Total Evaluation Time   PT Eval, Low Complexity Minutes (44687) 15

## 2023-01-10 NOTE — PROGRESS NOTES
Fetal Non-Stress Test Results    NST Ordered By: Dr. Godinez  NST Medical Indication: Abdominal pain    NST Start & Stop Times  NST Start Time: 2125  NST Stop Time: 2145                            NST Results  Fetus A   Baseline Rate: Normal  Accelerations: Present  Decelerations: None  Interpretation: reactive

## 2023-01-16 ENCOUNTER — HOSPITAL ENCOUNTER (OUTPATIENT)
Dept: PHYSICAL THERAPY | Facility: HOSPITAL | Age: 33
Setting detail: THERAPIES SERIES
Discharge: HOME | End: 2023-01-16
Attending: FAMILY MEDICINE
Payer: COMMERCIAL

## 2023-01-16 PROCEDURE — 97140 MANUAL THERAPY 1/> REGIONS: CPT | Mod: GP

## 2023-01-16 PROCEDURE — 97110 THERAPEUTIC EXERCISES: CPT | Mod: GP

## 2023-01-19 ENCOUNTER — PRENATAL OFFICE VISIT (OUTPATIENT)
Dept: OBGYN | Facility: OTHER | Age: 33
End: 2023-01-19
Attending: OBSTETRICS & GYNECOLOGY
Payer: COMMERCIAL

## 2023-01-19 VITALS
BODY MASS INDEX: 28.72 KG/M2 | OXYGEN SATURATION: 98 % | DIASTOLIC BLOOD PRESSURE: 64 MMHG | RESPIRATION RATE: 15 BRPM | WEIGHT: 157 LBS | SYSTOLIC BLOOD PRESSURE: 106 MMHG | HEART RATE: 100 BPM

## 2023-01-19 DIAGNOSIS — R10.84 ABDOMINAL PAIN, GENERALIZED: ICD-10-CM

## 2023-01-19 DIAGNOSIS — R10.13 ABDOMINAL PAIN, EPIGASTRIC: ICD-10-CM

## 2023-01-19 DIAGNOSIS — Z34.83 NORMAL PREGNANCY IN MULTIGRAVIDA IN THIRD TRIMESTER: Primary | ICD-10-CM

## 2023-01-19 LAB
ALBUMIN SERPL BCG-MCNC: 3.8 G/DL (ref 3.5–5.2)
ALP SERPL-CCNC: 99 U/L (ref 35–104)
ALT SERPL W P-5'-P-CCNC: 9 U/L (ref 10–35)
ANION GAP SERPL CALCULATED.3IONS-SCNC: 11 MMOL/L (ref 7–15)
AST SERPL W P-5'-P-CCNC: 16 U/L (ref 10–35)
BILIRUB SERPL-MCNC: 0.4 MG/DL
BUN SERPL-MCNC: 4.5 MG/DL (ref 6–20)
CALCIUM SERPL-MCNC: 8.7 MG/DL (ref 8.6–10)
CHLORIDE SERPL-SCNC: 103 MMOL/L (ref 98–107)
CREAT SERPL-MCNC: 0.49 MG/DL (ref 0.51–0.95)
DEPRECATED HCO3 PLAS-SCNC: 21 MMOL/L (ref 22–29)
ERYTHROCYTE [DISTWIDTH] IN BLOOD BY AUTOMATED COUNT: 12.7 % (ref 10–15)
GFR SERPL CREATININE-BSD FRML MDRD: >90 ML/MIN/1.73M2
GLUCOSE SERPL-MCNC: 71 MG/DL (ref 70–99)
HCT VFR BLD AUTO: 37.3 % (ref 35–47)
HGB BLD-MCNC: 12.9 G/DL (ref 11.7–15.7)
LIPASE SERPL-CCNC: 29 U/L (ref 13–60)
MCH RBC QN AUTO: 29.8 PG (ref 26.5–33)
MCHC RBC AUTO-ENTMCNC: 34.6 G/DL (ref 31.5–36.5)
MCV RBC AUTO: 86 FL (ref 78–100)
PLATELET # BLD AUTO: 191 10E3/UL (ref 150–450)
POTASSIUM SERPL-SCNC: 4.1 MMOL/L (ref 3.4–5.3)
PROT SERPL-MCNC: 6.2 G/DL (ref 6.4–8.3)
RBC # BLD AUTO: 4.33 10E6/UL (ref 3.8–5.2)
SODIUM SERPL-SCNC: 135 MMOL/L (ref 136–145)
WBC # BLD AUTO: 9.6 10E3/UL (ref 4–11)

## 2023-01-19 PROCEDURE — 85027 COMPLETE CBC AUTOMATED: CPT | Performed by: OBSTETRICS & GYNECOLOGY

## 2023-01-19 PROCEDURE — 99207 PR PRENATAL VISIT: CPT | Performed by: OBSTETRICS & GYNECOLOGY

## 2023-01-19 PROCEDURE — 83690 ASSAY OF LIPASE: CPT | Performed by: OBSTETRICS & GYNECOLOGY

## 2023-01-19 PROCEDURE — 36415 COLL VENOUS BLD VENIPUNCTURE: CPT | Performed by: OBSTETRICS & GYNECOLOGY

## 2023-01-19 PROCEDURE — 80053 COMPREHEN METABOLIC PANEL: CPT | Performed by: OBSTETRICS & GYNECOLOGY

## 2023-01-19 ASSESSMENT — PAIN SCALES - GENERAL: PAINLEVEL: MILD PAIN (2)

## 2023-01-19 NOTE — PROGRESS NOTES
Doing well.  No concerns today except epigastric pain/nausea/heartburn and occasional dizziness.  Abdomen benign exam.  Labs(CMP/LIPASE/CMP) ordered.  Tums not helping. Advised OTC pepcid/priloxec.   Discussed signs of labor and when to call or come in.  Discussed kick counts and fetal movement.  She will report to OB if contractions every 5-10 minutes or if rupture of membranes.  RTC in 1 week.  Preop next visit.   Denies regular contractions, vaginal bleeding, LOF  Ankush Godinez MD  1/19/2023

## 2023-01-23 ENCOUNTER — HOSPITAL ENCOUNTER (OUTPATIENT)
Dept: ULTRASOUND IMAGING | Facility: HOSPITAL | Age: 33
Discharge: HOME | End: 2023-01-23
Attending: OBSTETRICS & GYNECOLOGY | Admitting: OBSTETRICS & GYNECOLOGY
Payer: COMMERCIAL

## 2023-01-23 ENCOUNTER — HOSPITAL ENCOUNTER (OUTPATIENT)
Dept: PHYSICAL THERAPY | Facility: HOSPITAL | Age: 33
Setting detail: THERAPIES SERIES
Discharge: HOME | End: 2023-01-23
Attending: FAMILY MEDICINE
Payer: COMMERCIAL

## 2023-01-23 DIAGNOSIS — U07.1 COVID-19 AFFECTING PREGNANCY IN THIRD TRIMESTER: ICD-10-CM

## 2023-01-23 DIAGNOSIS — O98.513 COVID-19 AFFECTING PREGNANCY IN THIRD TRIMESTER: ICD-10-CM

## 2023-01-23 PROCEDURE — 97110 THERAPEUTIC EXERCISES: CPT | Mod: GP

## 2023-01-23 PROCEDURE — 76816 OB US FOLLOW-UP PER FETUS: CPT

## 2023-01-23 PROCEDURE — 97140 MANUAL THERAPY 1/> REGIONS: CPT | Mod: GP

## 2023-01-26 ENCOUNTER — PRENATAL OFFICE VISIT (OUTPATIENT)
Dept: OBGYN | Facility: OTHER | Age: 33
End: 2023-01-26
Attending: OBSTETRICS & GYNECOLOGY
Payer: COMMERCIAL

## 2023-01-26 VITALS
DIASTOLIC BLOOD PRESSURE: 60 MMHG | SYSTOLIC BLOOD PRESSURE: 100 MMHG | OXYGEN SATURATION: 97 % | HEART RATE: 98 BPM | HEIGHT: 62 IN | WEIGHT: 157 LBS | BODY MASS INDEX: 28.89 KG/M2

## 2023-01-26 DIAGNOSIS — Z98.891 HISTORY OF CESAREAN SECTION: ICD-10-CM

## 2023-01-26 DIAGNOSIS — Z34.83 NORMAL PREGNANCY IN MULTIGRAVIDA IN THIRD TRIMESTER: Primary | ICD-10-CM

## 2023-01-26 DIAGNOSIS — Z01.818 PREOP GENERAL PHYSICAL EXAM: ICD-10-CM

## 2023-01-26 PROCEDURE — 99207 PR PRENATAL VISIT: CPT | Performed by: OBSTETRICS & GYNECOLOGY

## 2023-01-26 RX ORDER — FAMOTIDINE 20 MG/1
20 TABLET, FILM COATED ORAL DAILY
COMMUNITY

## 2023-01-26 ASSESSMENT — PAIN SCALES - GENERAL: PAINLEVEL: NO PAIN (0)

## 2023-01-26 NOTE — PROGRESS NOTES
Preop done.   H&P reviewed and unchanged from prenatal record.  Chest clear to auscultation.  CV regular rate and rhythm without murmurs.  Abdomen soft, nontender, gravid.  Extremities negative.  Reveiwed goals, risks, alternatives of a  section including bleeding, infection, and potential damage to other organs including bowel, bladder, baby, blood vessels and nerves.  Potential need for  in future.  Hosptial stay and recovery period discussed.  All questions were answered.    Ankush Godinez MD

## 2023-01-30 ENCOUNTER — HOSPITAL ENCOUNTER (OUTPATIENT)
Dept: PHYSICAL THERAPY | Facility: HOSPITAL | Age: 33
Setting detail: THERAPIES SERIES
Discharge: HOME | End: 2023-01-30
Attending: FAMILY MEDICINE
Payer: COMMERCIAL

## 2023-01-30 PROCEDURE — 97110 THERAPEUTIC EXERCISES: CPT | Mod: GP

## 2023-01-30 PROCEDURE — 97140 MANUAL THERAPY 1/> REGIONS: CPT | Mod: GP

## 2023-01-31 ENCOUNTER — ANESTHESIA EVENT (OUTPATIENT)
Dept: SURGERY | Facility: HOSPITAL | Age: 33
End: 2023-01-31
Payer: COMMERCIAL

## 2023-01-31 NOTE — ANESTHESIA PREPROCEDURE EVALUATION
Anesthesia Pre-Procedure Evaluation    Patient: Tiffanie Alejo   MRN: [id number] : 1990        Procedure : Procedure(s):   SECTION          Past Medical History:   Diagnosis Date     Gluten intolerance      Lactose Intolerance 2013     Ovarian cyst 2013     Spondylolisthesis at L5-S1 level     grade 1     Urinary incontinence     urge      Past Surgical History:   Procedure Laterality Date     ADENOIDECTOMY  2010      SECTION N/A 2019    Procedure:  SECTION;  Surgeon: Ankush Godinez MD;  Location: [facility]     COLONOSCOPY  2014     ESOPHAGOGASTRODUODENOSCOPY  2014     GI SURGERY  3/7/13    colonoscopy     LAPAROSCOPY DIAGNOSTIC (GENERAL)       TONSILLECTOMY       [place] Teeth Extraction        Allergies   Allergen Reactions     Cats      Facial swelling     Wheat Extract      Stomach pains, constipation and diarrhea      Social History     Tobacco Use     Smoking status: Never     Smokeless tobacco: Never   Substance Use Topics     Alcohol use: Yes     Alcohol/week: 1.7 standard drinks     Types: 2 Cans of beer per week     Comment: social - weekly      Wt Readings from Last 1 Encounters:   23 71.2 kg (157 lb)        Anesthesia Evaluation   Pt has had prior anesthetic. Type: MAC, General and Regional.    History of anesthetic complications  - PONV.      ROS/MED HX  ENT/Pulmonary:  - neg pulmonary ROS     Neurologic: Comment: Spondylolisthesis at L5-S1 level    (+) migraines,     Cardiovascular:       METS/Exercise Tolerance: >4 METS    Hematologic:     (+) anemia,     Musculoskeletal:  - neg musculoskeletal ROS     GI/Hepatic: Comment: Gluten intolerance  Lactose intolerance  + nausea, occasional vomiting    (+) GERD, Symptomatic,     Renal/Genitourinary: Comment: Urinary incontinence      Endo:  - neg endo ROS     Psychiatric/Substance Use:  - neg psychiatric ROS     Infectious Disease:  - neg infectious disease ROS     Malignancy:  - neg  malignancy ROS     Other:            Physical Exam    Airway        Mallampati: I   TM distance: > 3 FB   Neck ROM: full   Mouth opening: > 3 cm    Respiratory Devices and Support         Dental       (+) Completely normal teeth      Cardiovascular   cardiovascular exam normal       Rhythm and rate: regular and normal     Pulmonary   pulmonary exam normal        breath sounds clear to auscultation           OUTSIDE LABS:  CBC:   Lab Results   Component Value Date    WBC 9.6 01/19/2023    WBC 8.2 11/18/2022    HGB 12.9 01/19/2023    HGB 12.1 11/18/2022    HCT 37.3 01/19/2023    HCT 34.8 (L) 11/18/2022     01/19/2023     11/18/2022     BMP:   Lab Results   Component Value Date     (L) 01/19/2023     (L) 10/12/2022    POTASSIUM 4.1 01/19/2023    POTASSIUM 3.8 10/12/2022    CHLORIDE 103 01/19/2023    CHLORIDE 103 10/12/2022    CO2 21 (L) 01/19/2023    CO2 22 10/12/2022    BUN 4.5 (L) 01/19/2023    BUN 5.3 (L) 10/12/2022    CR 0.49 (L) 01/19/2023    CR 0.44 (L) 10/12/2022    GLC 71 01/19/2023    GLC 86 10/12/2022     COAGS: No results found for: PTT, INR, FIBR  POC:   Lab Results   Component Value Date    HCG Negative 05/27/2014     HEPATIC:   Lab Results   Component Value Date    ALBUMIN 3.8 01/19/2023    PROTTOTAL 6.2 (L) 01/19/2023    ALT 9 (L) 01/19/2023    AST 16 01/19/2023    ALKPHOS 99 01/19/2023    BILITOTAL 0.4 01/19/2023     OTHER:   Lab Results   Component Value Date    SHANNAN 8.7 01/19/2023    MAG 1.9 10/12/2022    LIPASE 29 01/19/2023       Anesthesia Plan    ASA Status:  2   NPO Status:  ELEVATED Aspiration Risk/Unknown    Anesthesia Type: Spinal.              Consents    Anesthesia Plan(s) and associated risks, benefits, and realistic alternatives discussed. Questions answered and patient/representative(s) expressed understanding.     - Discussed: Risks, Benefits and Alternatives for BOTH SEDATION and the PROCEDURE were discussed     - Discussed with:  Patient      - Extended  Intubation/Ventilatory Support Discussed: No.      - Patient is DNR/DNI Status: No    Use of blood products discussed: No .     Postoperative Care    Pain management: Neuraxial analgesia, Peripheral nerve block (Single Shot).   PONV prophylaxis: Ondansetron (or other 5HT-3), Dexamethasone or Solumedrol     Comments:    Other Comments: HP in episodes tab 7/7/22            LEILA MOORE CRNA

## 2023-02-02 ENCOUNTER — PRENATAL OFFICE VISIT (OUTPATIENT)
Dept: OBGYN | Facility: OTHER | Age: 33
End: 2023-02-02
Attending: NURSE PRACTITIONER
Payer: COMMERCIAL

## 2023-02-02 VITALS
WEIGHT: 161.6 LBS | OXYGEN SATURATION: 97 % | HEIGHT: 62 IN | DIASTOLIC BLOOD PRESSURE: 60 MMHG | HEART RATE: 89 BPM | BODY MASS INDEX: 29.74 KG/M2 | SYSTOLIC BLOOD PRESSURE: 101 MMHG

## 2023-02-02 DIAGNOSIS — Z98.891 HISTORY OF CESAREAN SECTION: ICD-10-CM

## 2023-02-02 DIAGNOSIS — Z34.83 NORMAL PREGNANCY IN MULTIGRAVIDA IN THIRD TRIMESTER: Primary | ICD-10-CM

## 2023-02-02 PROCEDURE — 99207 PR PRENATAL VISIT: CPT | Performed by: NURSE PRACTITIONER

## 2023-02-02 ASSESSMENT — PAIN SCALES - GENERAL: PAINLEVEL: NO PAIN (0)

## 2023-02-02 NOTE — PROGRESS NOTES
Doing well.  No concerns.  Planning pumping and feeding as she did this for 11 months last time.  Pumping schedule discussed.  Pre-op, instructions, and soap for CD previously completed.  Signs of labor and late pregnancy warning signs reviewed and encouraged to come in asap.  Return for scheduled CD next week.

## 2023-02-02 NOTE — PATIENT INSTRUCTIONS
"BREASTFEEDING TIPS  1. Breastfeed every 2-4 hours. If your baby is sleepy - use breast compression, push on chin to \"start up\" baby, switch breasts, undress to diaper and wake before relatching.   Some babies \"cluster\" feed every 1 hour for a while- this is normal. Feed your baby whenever he/she is awake-  even if every hour for a while. This frequent feeding will help you make more milk and encourage your baby to sleep for longer stretches later in the evening or night.    - Position your baby close to you with pillows so he/she is facing you -belly to belly laying horizontally across your lap at the level of your breast and looking a bit \"upwards\" to your breast   -One hand holds the baby's neck behind the ears and the other hand holds your breast  -Baby's nose should start out pointing to your nipple before latching  - Hold your breast in a \"sandwich\" position by gently squeezing your breast in an oval shape and make sure your hands are not covering the areola  This \"nipple sandwich\" will make it easier for your breast to fit inside the baby's mouth-making latching more comfortable for you and baby and preventing sore nipples. Your baby should take a \"mouthful\" of breast!  - You may want to use hand expression to \"prime the pump\" and get a drip of milk out on your nipple to wake baby   (see website: newborns.[employer].edu/Breastfeeding/HandExpression.html)  - Swipe your nipple on baby's upper lip and wait for a BIG open mouth  - YOU bring baby to the breast (hold baby's neck with your fingers just below the ears) and bring baby's head to the breast--leading with the chin.  Try to avoid pushing your breast into baby's mouth- bring baby to you instead!  - Aim to get your baby's bottom lip LOW DOWN ON AREOLA (baby's upper lip just needs to \"clear\" the nipple) .   Your baby should latch onto the areola and NOT just the nipple. That way your baby gets more milk and you don't get sore nipples!      Useful web " sites:  Www.infantrisk.com  Www.aap.org  Www.ibreastfeeding.com  Www.health.[facility].[email or web address].us

## 2023-02-08 ENCOUNTER — TELEPHONE (OUTPATIENT)
Dept: OBGYN | Facility: HOSPITAL | Age: 33
End: 2023-02-08

## 2023-02-08 NOTE — TELEPHONE ENCOUNTER
Pt called prior to scheduled c/s on 2/9/23. Educated pt on arrival time and to stop in ER/Admitting. Pt educated on current visitor policy. Reminded pt to use Hibiclens soap with shower the night before and morning of surgery and to drink carbohydrate drink. Instructed pt to remove all jewelry prior to arrival. Verified with pt peds MD and plan for skin to skin in the OR. Prenatal and chart reviewed and H&P confirmed.

## 2023-02-09 ENCOUNTER — SURGERY (OUTPATIENT)
Age: 33
End: 2023-02-09
Payer: COMMERCIAL

## 2023-02-09 ENCOUNTER — HOSPITAL ENCOUNTER (INPATIENT)
Facility: HOSPITAL | Age: 33
LOS: 2 days | Discharge: HOME | End: 2023-02-11
Attending: OBSTETRICS & GYNECOLOGY | Admitting: OBSTETRICS & GYNECOLOGY
Payer: COMMERCIAL

## 2023-02-09 ENCOUNTER — ANESTHESIA (OUTPATIENT)
Dept: SURGERY | Facility: HOSPITAL | Age: 33
End: 2023-02-09
Payer: COMMERCIAL

## 2023-02-09 ENCOUNTER — APPOINTMENT (OUTPATIENT)
Dept: ULTRASOUND IMAGING | Facility: HOSPITAL | Age: 33
End: 2023-02-09
Attending: NURSE ANESTHETIST, CERTIFIED REGISTERED
Payer: COMMERCIAL

## 2023-02-09 LAB
ABO/RH(D): NORMAL
ANTIBODY SCREEN: NEGATIVE
ERYTHROCYTE [DISTWIDTH] IN BLOOD BY AUTOMATED COUNT: 13.1 % (ref 10–15)
HCT VFR BLD AUTO: 36.4 % (ref 35–47)
HGB BLD-MCNC: 12.3 G/DL (ref 11.7–15.7)
HOLD SPECIMEN: NORMAL
MCH RBC QN AUTO: 29.2 PG (ref 26.5–33)
MCHC RBC AUTO-ENTMCNC: 33.8 G/DL (ref 31.5–36.5)
MCV RBC AUTO: 87 FL (ref 78–100)
PLATELET # BLD AUTO: 173 10E3/UL (ref 150–450)
RBC # BLD AUTO: 4.21 10E6/UL (ref 3.8–5.2)
SPECIMEN EXPIRATION DATE: NORMAL
WBC # BLD AUTO: 8.1 10E3/UL (ref 4–11)

## 2023-02-09 PROCEDURE — 36415 COLL VENOUS BLD VENIPUNCTURE: CPT | Performed by: OBSTETRICS & GYNECOLOGY

## 2023-02-09 PROCEDURE — 86780 TREPONEMA PALLIDUM: CPT | Performed by: OBSTETRICS & GYNECOLOGY

## 2023-02-09 PROCEDURE — 250N000011 HC RX IP 250 OP 636: Performed by: NURSE ANESTHETIST, CERTIFIED REGISTERED

## 2023-02-09 PROCEDURE — 258N000003 HC RX IP 258 OP 636: Performed by: NURSE ANESTHETIST, CERTIFIED REGISTERED

## 2023-02-09 PROCEDURE — 120N000001 HC R&B MED SURG/OB

## 2023-02-09 PROCEDURE — 272N000001 HC OR GENERAL SUPPLY STERILE: Performed by: OBSTETRICS & GYNECOLOGY

## 2023-02-09 PROCEDURE — 250N000009 HC RX 250: Performed by: NURSE ANESTHETIST, CERTIFIED REGISTERED

## 2023-02-09 PROCEDURE — 250N000011 HC RX IP 250 OP 636: Performed by: OBSTETRICS & GYNECOLOGY

## 2023-02-09 PROCEDURE — 710N000010 HC RECOVERY PHASE 1, LEVEL 2, PER MIN: Performed by: OBSTETRICS & GYNECOLOGY

## 2023-02-09 PROCEDURE — 250N000009 HC RX 250: Performed by: OBSTETRICS & GYNECOLOGY

## 2023-02-09 PROCEDURE — 250N000013 HC RX MED GY IP 250 OP 250 PS 637: Performed by: OBSTETRICS & GYNECOLOGY

## 2023-02-09 PROCEDURE — C9290 INJ, BUPIVACAINE LIPOSOME: HCPCS | Performed by: NURSE ANESTHETIST, CERTIFIED REGISTERED

## 2023-02-09 PROCEDURE — 64488 TAP BLOCK BI INJECTION: CPT | Mod: XU | Performed by: NURSE ANESTHETIST, CERTIFIED REGISTERED

## 2023-02-09 PROCEDURE — 59514 CESAREAN DELIVERY ONLY: CPT | Mod: AS | Performed by: NURSE PRACTITIONER

## 2023-02-09 PROCEDURE — 370N000017 HC ANESTHESIA TECHNICAL FEE, PER MIN: Performed by: OBSTETRICS & GYNECOLOGY

## 2023-02-09 PROCEDURE — 85027 COMPLETE CBC AUTOMATED: CPT | Performed by: OBSTETRICS & GYNECOLOGY

## 2023-02-09 PROCEDURE — 360N000076 HC SURGERY LEVEL 3, PER MIN: Performed by: OBSTETRICS & GYNECOLOGY

## 2023-02-09 PROCEDURE — 258N000003 HC RX IP 258 OP 636: Performed by: OBSTETRICS & GYNECOLOGY

## 2023-02-09 PROCEDURE — 59510 CESAREAN DELIVERY: CPT | Performed by: OBSTETRICS & GYNECOLOGY

## 2023-02-09 PROCEDURE — 86901 BLOOD TYPING SEROLOGIC RH(D): CPT | Performed by: OBSTETRICS & GYNECOLOGY

## 2023-02-09 PROCEDURE — 999N000157 HC STATISTIC RCP TIME EA 10 MIN

## 2023-02-09 PROCEDURE — 59510 CESAREAN DELIVERY: CPT | Performed by: NURSE ANESTHETIST, CERTIFIED REGISTERED

## 2023-02-09 RX ORDER — CEFOXITIN 2 G/1
2 INJECTION, POWDER, FOR SOLUTION INTRAVENOUS ONCE
Status: COMPLETED | OUTPATIENT
Start: 2023-02-09 | End: 2023-02-09

## 2023-02-09 RX ORDER — DEXAMETHASONE SODIUM PHOSPHATE 10 MG/ML
INJECTION, SOLUTION INTRAMUSCULAR; INTRAVENOUS PRN
Status: DISCONTINUED | OUTPATIENT
Start: 2023-02-09 | End: 2023-02-09

## 2023-02-09 RX ORDER — BUPIVACAINE HYDROCHLORIDE 2.5 MG/ML
INJECTION, SOLUTION EPIDURAL; INFILTRATION; INTRACAUDAL
Status: COMPLETED | OUTPATIENT
Start: 2023-02-09 | End: 2023-02-09

## 2023-02-09 RX ORDER — ONDANSETRON 2 MG/ML
INJECTION INTRAMUSCULAR; INTRAVENOUS PRN
Status: DISCONTINUED | OUTPATIENT
Start: 2023-02-09 | End: 2023-02-09

## 2023-02-09 RX ORDER — FAMOTIDINE 20 MG/1
20 TABLET, FILM COATED ORAL DAILY
Status: DISCONTINUED | OUTPATIENT
Start: 2023-02-09 | End: 2023-02-11 | Stop reason: HOSPADM

## 2023-02-09 RX ORDER — METHYLERGONOVINE MALEATE 0.2 MG/ML
200 INJECTION INTRAVENOUS
Status: DISCONTINUED | OUTPATIENT
Start: 2023-02-09 | End: 2023-02-09 | Stop reason: HOSPADM

## 2023-02-09 RX ORDER — AMOXICILLIN 250 MG
1 CAPSULE ORAL 2 TIMES DAILY
Status: DISCONTINUED | OUTPATIENT
Start: 2023-02-09 | End: 2023-02-11 | Stop reason: HOSPADM

## 2023-02-09 RX ORDER — NALOXONE HYDROCHLORIDE 0.4 MG/ML
0.2 INJECTION, SOLUTION INTRAMUSCULAR; INTRAVENOUS; SUBCUTANEOUS
Status: DISCONTINUED | OUTPATIENT
Start: 2023-02-09 | End: 2023-02-10

## 2023-02-09 RX ORDER — OXYCODONE HYDROCHLORIDE 5 MG/1
5 TABLET ORAL EVERY 4 HOURS PRN
Status: DISCONTINUED | OUTPATIENT
Start: 2023-02-09 | End: 2023-02-09 | Stop reason: HOSPADM

## 2023-02-09 RX ORDER — MISOPROSTOL 200 UG/1
800 TABLET ORAL
Status: DISCONTINUED | OUTPATIENT
Start: 2023-02-09 | End: 2023-02-11 | Stop reason: HOSPADM

## 2023-02-09 RX ORDER — DEXAMETHASONE SODIUM PHOSPHATE 10 MG/ML
INJECTION, SOLUTION INTRAMUSCULAR; INTRAVENOUS
Status: COMPLETED | OUTPATIENT
Start: 2023-02-09 | End: 2023-02-09

## 2023-02-09 RX ORDER — CARBOPROST TROMETHAMINE 250 UG/ML
250 INJECTION, SOLUTION INTRAMUSCULAR
Status: DISCONTINUED | OUTPATIENT
Start: 2023-02-09 | End: 2023-02-09 | Stop reason: HOSPADM

## 2023-02-09 RX ORDER — MISOPROSTOL 200 UG/1
400 TABLET ORAL
Status: DISCONTINUED | OUTPATIENT
Start: 2023-02-09 | End: 2023-02-11 | Stop reason: HOSPADM

## 2023-02-09 RX ORDER — LIDOCAINE 40 MG/G
CREAM TOPICAL
Status: DISCONTINUED | OUTPATIENT
Start: 2023-02-09 | End: 2023-02-09 | Stop reason: HOSPADM

## 2023-02-09 RX ORDER — PHENYLEPHRINE HYDROCHLORIDE 10 MG/ML
INJECTION INTRAVENOUS PRN
Status: DISCONTINUED | OUTPATIENT
Start: 2023-02-09 | End: 2023-02-09

## 2023-02-09 RX ORDER — TRANEXAMIC ACID 10 MG/ML
1 INJECTION, SOLUTION INTRAVENOUS EVERY 30 MIN PRN
Status: DISCONTINUED | OUTPATIENT
Start: 2023-02-09 | End: 2023-02-11 | Stop reason: HOSPADM

## 2023-02-09 RX ORDER — METHYLERGONOVINE MALEATE 0.2 MG/ML
200 INJECTION INTRAVENOUS
Status: DISCONTINUED | OUTPATIENT
Start: 2023-02-09 | End: 2023-02-11 | Stop reason: HOSPADM

## 2023-02-09 RX ORDER — KETOROLAC TROMETHAMINE 30 MG/ML
30 INJECTION, SOLUTION INTRAMUSCULAR; INTRAVENOUS EVERY 6 HOURS
Status: COMPLETED | OUTPATIENT
Start: 2023-02-09 | End: 2023-02-10

## 2023-02-09 RX ORDER — OXYCODONE HYDROCHLORIDE 5 MG/1
5 TABLET ORAL EVERY 4 HOURS PRN
Status: DISCONTINUED | OUTPATIENT
Start: 2023-02-09 | End: 2023-02-11 | Stop reason: HOSPADM

## 2023-02-09 RX ORDER — CEFAZOLIN SODIUM/WATER 2 G/20 ML
2 SYRINGE (ML) INTRAVENOUS SEE ADMIN INSTRUCTIONS
Status: DISCONTINUED | OUTPATIENT
Start: 2023-02-09 | End: 2023-02-09 | Stop reason: HOSPADM

## 2023-02-09 RX ORDER — FENTANYL CITRATE 50 UG/ML
50 INJECTION, SOLUTION INTRAMUSCULAR; INTRAVENOUS EVERY 5 MIN PRN
Status: DISCONTINUED | OUTPATIENT
Start: 2023-02-09 | End: 2023-02-09 | Stop reason: HOSPADM

## 2023-02-09 RX ORDER — OXYTOCIN/0.9 % SODIUM CHLORIDE 30/500 ML
340 PLASTIC BAG, INJECTION (ML) INTRAVENOUS CONTINUOUS PRN
Status: DISCONTINUED | OUTPATIENT
Start: 2023-02-09 | End: 2023-02-09 | Stop reason: HOSPADM

## 2023-02-09 RX ORDER — MODIFIED LANOLIN
OINTMENT (GRAM) TOPICAL
Status: DISCONTINUED | OUTPATIENT
Start: 2023-02-09 | End: 2023-02-11 | Stop reason: HOSPADM

## 2023-02-09 RX ORDER — CEFAZOLIN SODIUM/WATER 2 G/20 ML
2 SYRINGE (ML) INTRAVENOUS
Status: COMPLETED | OUTPATIENT
Start: 2023-02-09 | End: 2023-02-09

## 2023-02-09 RX ORDER — CITRIC ACID/SODIUM CITRATE 334-500MG
30 SOLUTION, ORAL ORAL
Status: COMPLETED | OUTPATIENT
Start: 2023-02-09 | End: 2023-02-09

## 2023-02-09 RX ORDER — OXYTOCIN/0.9 % SODIUM CHLORIDE 30/500 ML
100-340 PLASTIC BAG, INJECTION (ML) INTRAVENOUS CONTINUOUS PRN
Status: DISCONTINUED | OUTPATIENT
Start: 2023-02-09 | End: 2023-02-10

## 2023-02-09 RX ORDER — NALOXONE HYDROCHLORIDE 0.4 MG/ML
0.4 INJECTION, SOLUTION INTRAMUSCULAR; INTRAVENOUS; SUBCUTANEOUS
Status: DISCONTINUED | OUTPATIENT
Start: 2023-02-09 | End: 2023-02-10

## 2023-02-09 RX ORDER — OXYTOCIN/0.9 % SODIUM CHLORIDE 30/500 ML
340 PLASTIC BAG, INJECTION (ML) INTRAVENOUS CONTINUOUS PRN
Status: DISCONTINUED | OUTPATIENT
Start: 2023-02-09 | End: 2023-02-11 | Stop reason: HOSPADM

## 2023-02-09 RX ORDER — ONDANSETRON 4 MG/1
4 TABLET, ORALLY DISINTEGRATING ORAL EVERY 6 HOURS PRN
Status: DISCONTINUED | OUTPATIENT
Start: 2023-02-09 | End: 2023-02-11 | Stop reason: HOSPADM

## 2023-02-09 RX ORDER — LIDOCAINE 40 MG/G
CREAM TOPICAL
Status: DISCONTINUED | OUTPATIENT
Start: 2023-02-09 | End: 2023-02-11 | Stop reason: HOSPADM

## 2023-02-09 RX ORDER — SIMETHICONE 80 MG
80 TABLET,CHEWABLE ORAL 4 TIMES DAILY PRN
Status: DISCONTINUED | OUTPATIENT
Start: 2023-02-09 | End: 2023-02-11 | Stop reason: HOSPADM

## 2023-02-09 RX ORDER — EPHEDRINE SULFATE 50 MG/ML
INJECTION, SOLUTION INTRAVENOUS PRN
Status: DISCONTINUED | OUTPATIENT
Start: 2023-02-09 | End: 2023-02-09

## 2023-02-09 RX ORDER — HYDROCORTISONE 25 MG/G
CREAM TOPICAL 3 TIMES DAILY PRN
Status: DISCONTINUED | OUTPATIENT
Start: 2023-02-09 | End: 2023-02-11 | Stop reason: HOSPADM

## 2023-02-09 RX ORDER — BISACODYL 10 MG
10 SUPPOSITORY, RECTAL RECTAL DAILY PRN
Status: DISCONTINUED | OUTPATIENT
Start: 2023-02-11 | End: 2023-02-11 | Stop reason: HOSPADM

## 2023-02-09 RX ORDER — DIPHENHYDRAMINE HCL 25 MG
25 CAPSULE ORAL EVERY 6 HOURS PRN
Status: DISCONTINUED | OUTPATIENT
Start: 2023-02-09 | End: 2023-02-11 | Stop reason: HOSPADM

## 2023-02-09 RX ORDER — OXYTOCIN/0.9 % SODIUM CHLORIDE 30/500 ML
PLASTIC BAG, INJECTION (ML) INTRAVENOUS CONTINUOUS PRN
Status: DISCONTINUED | OUTPATIENT
Start: 2023-02-09 | End: 2023-02-09

## 2023-02-09 RX ORDER — DIPHENHYDRAMINE HYDROCHLORIDE 50 MG/ML
25 INJECTION INTRAMUSCULAR; INTRAVENOUS EVERY 6 HOURS PRN
Status: DISCONTINUED | OUTPATIENT
Start: 2023-02-09 | End: 2023-02-11 | Stop reason: HOSPADM

## 2023-02-09 RX ORDER — SODIUM CHLORIDE, SODIUM LACTATE, POTASSIUM CHLORIDE, CALCIUM CHLORIDE 600; 310; 30; 20 MG/100ML; MG/100ML; MG/100ML; MG/100ML
INJECTION, SOLUTION INTRAVENOUS CONTINUOUS
Status: DISCONTINUED | OUTPATIENT
Start: 2023-02-09 | End: 2023-02-09 | Stop reason: HOSPADM

## 2023-02-09 RX ORDER — ONDANSETRON 4 MG/1
4 TABLET, ORALLY DISINTEGRATING ORAL EVERY 30 MIN PRN
Status: DISCONTINUED | OUTPATIENT
Start: 2023-02-09 | End: 2023-02-09 | Stop reason: HOSPADM

## 2023-02-09 RX ORDER — CARBOPROST TROMETHAMINE 250 UG/ML
250 INJECTION, SOLUTION INTRAMUSCULAR
Status: DISCONTINUED | OUTPATIENT
Start: 2023-02-09 | End: 2023-02-11 | Stop reason: HOSPADM

## 2023-02-09 RX ORDER — MISOPROSTOL 200 UG/1
400 TABLET ORAL
Status: DISCONTINUED | OUTPATIENT
Start: 2023-02-09 | End: 2023-02-09 | Stop reason: HOSPADM

## 2023-02-09 RX ORDER — VITAMIN A ACETATE, .BETA.-CAROTENE, ASCORBIC ACID, CHOLECALCIFEROL, .ALPHA.-TOCOPHEROL ACETATE, DL-, THIAMINE MONONITRATE, RIBOFLAVIN, NIACINAMIDE, PYRIDOXINE HYDROCHLORIDE, FOLIC ACID, CYANOCOBALAMIN, CALCIUM CARBONATE, FERROUS FUMARATE, ZINC OXIDE, AND CUPRIC OXIDE 2000; 2000; 120; 400; 22; 1.84; 3; 20; 10; 1; 12; 200; 27; 25; 2 [IU]/1; [IU]/1; MG/1; [IU]/1; MG/1; MG/1; MG/1; MG/1; MG/1; MG/1; UG/1; MG/1; MG/1; MG/1; MG/1
1 TABLET ORAL DAILY
Status: DISCONTINUED | OUTPATIENT
Start: 2023-02-09 | End: 2023-02-11 | Stop reason: HOSPADM

## 2023-02-09 RX ORDER — ALBUTEROL SULFATE 0.83 MG/ML
2.5 SOLUTION RESPIRATORY (INHALATION) EVERY 4 HOURS PRN
Status: DISCONTINUED | OUTPATIENT
Start: 2023-02-09 | End: 2023-02-09 | Stop reason: HOSPADM

## 2023-02-09 RX ORDER — OXYTOCIN 10 [USP'U]/ML
10 INJECTION, SOLUTION INTRAMUSCULAR; INTRAVENOUS
Status: DISCONTINUED | OUTPATIENT
Start: 2023-02-09 | End: 2023-02-09 | Stop reason: HOSPADM

## 2023-02-09 RX ORDER — ACETAMINOPHEN 325 MG/1
975 TABLET ORAL EVERY 6 HOURS
Status: DISCONTINUED | OUTPATIENT
Start: 2023-02-09 | End: 2023-02-11 | Stop reason: HOSPADM

## 2023-02-09 RX ORDER — HYDRALAZINE HYDROCHLORIDE 20 MG/ML
2.5-5 INJECTION INTRAMUSCULAR; INTRAVENOUS EVERY 10 MIN PRN
Status: DISCONTINUED | OUTPATIENT
Start: 2023-02-09 | End: 2023-02-09 | Stop reason: HOSPADM

## 2023-02-09 RX ORDER — KETOROLAC TROMETHAMINE 30 MG/ML
INJECTION, SOLUTION INTRAMUSCULAR; INTRAVENOUS PRN
Status: DISCONTINUED | OUTPATIENT
Start: 2023-02-09 | End: 2023-02-09

## 2023-02-09 RX ORDER — AMOXICILLIN 250 MG
2 CAPSULE ORAL 2 TIMES DAILY
Status: DISCONTINUED | OUTPATIENT
Start: 2023-02-09 | End: 2023-02-11 | Stop reason: HOSPADM

## 2023-02-09 RX ORDER — ACETAMINOPHEN 325 MG/1
975 TABLET ORAL ONCE
Status: COMPLETED | OUTPATIENT
Start: 2023-02-09 | End: 2023-02-09

## 2023-02-09 RX ORDER — OXYCODONE HYDROCHLORIDE 5 MG/1
10 TABLET ORAL EVERY 4 HOURS PRN
Status: DISCONTINUED | OUTPATIENT
Start: 2023-02-09 | End: 2023-02-09 | Stop reason: HOSPADM

## 2023-02-09 RX ORDER — METOCLOPRAMIDE HYDROCHLORIDE 5 MG/ML
10 INJECTION INTRAMUSCULAR; INTRAVENOUS EVERY 6 HOURS PRN
Status: DISCONTINUED | OUTPATIENT
Start: 2023-02-09 | End: 2023-02-11 | Stop reason: HOSPADM

## 2023-02-09 RX ORDER — ONDANSETRON 2 MG/ML
4 INJECTION INTRAMUSCULAR; INTRAVENOUS EVERY 30 MIN PRN
Status: DISCONTINUED | OUTPATIENT
Start: 2023-02-09 | End: 2023-02-09 | Stop reason: HOSPADM

## 2023-02-09 RX ORDER — LABETALOL 20 MG/4 ML (5 MG/ML) INTRAVENOUS SYRINGE
10
Status: DISCONTINUED | OUTPATIENT
Start: 2023-02-09 | End: 2023-02-09 | Stop reason: HOSPADM

## 2023-02-09 RX ORDER — IBUPROFEN 200 MG
800 TABLET ORAL EVERY 6 HOURS
Status: DISCONTINUED | OUTPATIENT
Start: 2023-02-10 | End: 2023-02-11 | Stop reason: HOSPADM

## 2023-02-09 RX ORDER — OXYTOCIN 10 [USP'U]/ML
10 INJECTION, SOLUTION INTRAMUSCULAR; INTRAVENOUS
Status: DISCONTINUED | OUTPATIENT
Start: 2023-02-09 | End: 2023-02-11 | Stop reason: HOSPADM

## 2023-02-09 RX ORDER — METOCLOPRAMIDE 10 MG/1
10 TABLET ORAL EVERY 6 HOURS PRN
Status: DISCONTINUED | OUTPATIENT
Start: 2023-02-09 | End: 2023-02-11 | Stop reason: HOSPADM

## 2023-02-09 RX ORDER — TRANEXAMIC ACID 10 MG/ML
1 INJECTION, SOLUTION INTRAVENOUS EVERY 30 MIN PRN
Status: DISCONTINUED | OUTPATIENT
Start: 2023-02-09 | End: 2023-02-09 | Stop reason: HOSPADM

## 2023-02-09 RX ORDER — PROCHLORPERAZINE 25 MG
25 SUPPOSITORY, RECTAL RECTAL EVERY 12 HOURS PRN
Status: DISCONTINUED | OUTPATIENT
Start: 2023-02-09 | End: 2023-02-11 | Stop reason: HOSPADM

## 2023-02-09 RX ORDER — PROCHLORPERAZINE MALEATE 10 MG
10 TABLET ORAL EVERY 6 HOURS PRN
Status: DISCONTINUED | OUTPATIENT
Start: 2023-02-09 | End: 2023-02-11 | Stop reason: HOSPADM

## 2023-02-09 RX ORDER — BUPIVACAINE HYDROCHLORIDE 7.5 MG/ML
INJECTION, SOLUTION INTRASPINAL PRN
Status: DISCONTINUED | OUTPATIENT
Start: 2023-02-09 | End: 2023-02-09

## 2023-02-09 RX ORDER — EPHEDRINE SULFATE 50 MG/ML
5 INJECTION, SOLUTION INTRAMUSCULAR; INTRAVENOUS; SUBCUTANEOUS
Status: DISCONTINUED | OUTPATIENT
Start: 2023-02-09 | End: 2023-02-09 | Stop reason: HOSPADM

## 2023-02-09 RX ORDER — OXYTOCIN 10 [USP'U]/ML
10 INJECTION, SOLUTION INTRAMUSCULAR; INTRAVENOUS
Status: DISCONTINUED | OUTPATIENT
Start: 2023-02-09 | End: 2023-02-10

## 2023-02-09 RX ORDER — DEXTROSE, SODIUM CHLORIDE, SODIUM LACTATE, POTASSIUM CHLORIDE, AND CALCIUM CHLORIDE 5; .6; .31; .03; .02 G/100ML; G/100ML; G/100ML; G/100ML; G/100ML
INJECTION, SOLUTION INTRAVENOUS CONTINUOUS
Status: DISCONTINUED | OUTPATIENT
Start: 2023-02-09 | End: 2023-02-10

## 2023-02-09 RX ORDER — ONDANSETRON 2 MG/ML
4 INJECTION INTRAMUSCULAR; INTRAVENOUS EVERY 6 HOURS PRN
Status: DISCONTINUED | OUTPATIENT
Start: 2023-02-09 | End: 2023-02-11 | Stop reason: HOSPADM

## 2023-02-09 RX ORDER — NALBUPHINE HYDROCHLORIDE 10 MG/ML
2.5-5 INJECTION, SOLUTION INTRAMUSCULAR; INTRAVENOUS; SUBCUTANEOUS EVERY 6 HOURS PRN
Status: DISCONTINUED | OUTPATIENT
Start: 2023-02-09 | End: 2023-02-10

## 2023-02-09 RX ADMIN — PHENYLEPHRINE HYDROCHLORIDE 150 MCG: 10 INJECTION INTRAVENOUS at 09:09

## 2023-02-09 RX ADMIN — PHENYLEPHRINE HYDROCHLORIDE 50 MCG: 10 INJECTION INTRAVENOUS at 08:30

## 2023-02-09 RX ADMIN — TRANEXAMIC ACID 1 G: 1 INJECTION, SOLUTION INTRAVENOUS at 08:27

## 2023-02-09 RX ADMIN — PHENYLEPHRINE HYDROCHLORIDE 50 MCG: 10 INJECTION INTRAVENOUS at 08:57

## 2023-02-09 RX ADMIN — ONDANSETRON 4 MG: 2 INJECTION INTRAMUSCULAR; INTRAVENOUS at 08:21

## 2023-02-09 RX ADMIN — KETOROLAC TROMETHAMINE 30 MG: 30 INJECTION, SOLUTION INTRAMUSCULAR; INTRAVENOUS at 21:25

## 2023-02-09 RX ADMIN — DEXAMETHASONE SODIUM PHOSPHATE 6 MG: 10 INJECTION, SOLUTION INTRAMUSCULAR; INTRAVENOUS at 08:53

## 2023-02-09 RX ADMIN — FAMOTIDINE 20 MG: 20 TABLET ORAL at 12:44

## 2023-02-09 RX ADMIN — EPHEDRINE SULFATE 5 MG: 50 INJECTION, SOLUTION INTRAVENOUS at 09:18

## 2023-02-09 RX ADMIN — ACETAMINOPHEN 975 MG: 325 TABLET, FILM COATED ORAL at 12:44

## 2023-02-09 RX ADMIN — SODIUM CHLORIDE, SODIUM LACTATE, POTASSIUM CHLORIDE, CALCIUM CHLORIDE AND DEXTROSE MONOHYDRATE: 5; 600; 310; 30; 20 INJECTION, SOLUTION INTRAVENOUS at 12:40

## 2023-02-09 RX ADMIN — ACETAMINOPHEN 975 MG: 325 TABLET, FILM COATED ORAL at 19:39

## 2023-02-09 RX ADMIN — PHENYLEPHRINE HYDROCHLORIDE 50 MCG: 10 INJECTION INTRAVENOUS at 08:55

## 2023-02-09 RX ADMIN — KETOROLAC TROMETHAMINE 30 MG: 30 INJECTION, SOLUTION INTRAMUSCULAR at 09:26

## 2023-02-09 RX ADMIN — SODIUM CHLORIDE, POTASSIUM CHLORIDE, SODIUM LACTATE AND CALCIUM CHLORIDE: 600; 310; 30; 20 INJECTION, SOLUTION INTRAVENOUS at 06:31

## 2023-02-09 RX ADMIN — BUPIVACAINE HYDROCHLORIDE IN DEXTROSE 1.5 ML: 7.5 INJECTION, SOLUTION SUBARACHNOID at 08:26

## 2023-02-09 RX ADMIN — PHENYLEPHRINE HYDROCHLORIDE 100 MCG: 10 INJECTION INTRAVENOUS at 09:19

## 2023-02-09 RX ADMIN — HYDROMORPHONE HYDROCHLORIDE 0.1 MG: 1 INJECTION, SOLUTION INTRAMUSCULAR; INTRAVENOUS; SUBCUTANEOUS at 08:26

## 2023-02-09 RX ADMIN — BUPIVACAINE 20 ML: 13.3 INJECTION, SUSPENSION, LIPOSOMAL INFILTRATION at 09:12

## 2023-02-09 RX ADMIN — DEXAMETHASONE SODIUM PHOSPHATE 10 MG: 10 INJECTION, SOLUTION INTRAMUSCULAR; INTRAVENOUS at 09:12

## 2023-02-09 RX ADMIN — PHENYLEPHRINE HYDROCHLORIDE 200 MCG: 10 INJECTION INTRAVENOUS at 09:13

## 2023-02-09 RX ADMIN — ONDANSETRON 4 MG: 2 INJECTION INTRAMUSCULAR; INTRAVENOUS at 10:42

## 2023-02-09 RX ADMIN — SENNOSIDES AND DOCUSATE SODIUM 1 TABLET: 50; 8.6 TABLET ORAL at 21:25

## 2023-02-09 RX ADMIN — METOCLOPRAMIDE HYDROCHLORIDE 10 MG: 5 INJECTION INTRAMUSCULAR; INTRAVENOUS at 12:50

## 2023-02-09 RX ADMIN — Medication 2 G: at 08:20

## 2023-02-09 RX ADMIN — CEFOXITIN SODIUM 2 G: 2 POWDER, FOR SOLUTION INTRAVENOUS at 12:57

## 2023-02-09 RX ADMIN — PHENYLEPHRINE HYDROCHLORIDE 50 MCG: 10 INJECTION INTRAVENOUS at 08:42

## 2023-02-09 RX ADMIN — OXYTOCIN-SODIUM CHLORIDE 0.9% IV SOLN 30 UNIT/500ML 300 ML/HR: 30-0.9/5 SOLUTION at 08:51

## 2023-02-09 RX ADMIN — Medication 100 ML/HR: at 10:02

## 2023-02-09 RX ADMIN — SODIUM CHLORIDE, POTASSIUM CHLORIDE, SODIUM LACTATE AND CALCIUM CHLORIDE: 600; 310; 30; 20 INJECTION, SOLUTION INTRAVENOUS at 09:12

## 2023-02-09 RX ADMIN — PHENYLEPHRINE HYDROCHLORIDE 100 MCG: 10 INJECTION INTRAVENOUS at 09:03

## 2023-02-09 RX ADMIN — ACETAMINOPHEN 975 MG: 325 TABLET, FILM COATED ORAL at 06:30

## 2023-02-09 RX ADMIN — SODIUM CITRATE AND CITRIC ACID MONOHYDRATE 30 ML: 500; 334 SOLUTION ORAL at 08:08

## 2023-02-09 RX ADMIN — BUPIVACAINE HYDROCHLORIDE 40 ML: 2.5 INJECTION, SOLUTION EPIDURAL; INFILTRATION; INTRACAUDAL at 09:12

## 2023-02-09 RX ADMIN — KETOROLAC TROMETHAMINE 30 MG: 30 INJECTION, SOLUTION INTRAMUSCULAR; INTRAVENOUS at 15:52

## 2023-02-09 ASSESSMENT — ACTIVITIES OF DAILY LIVING (ADL)
ADLS_ACUITY_SCORE: 20
ADLS_ACUITY_SCORE: 24

## 2023-02-09 NOTE — PLAN OF CARE
Care assumed from PACU RN at approximately 1035.  Assessments as charted. B/P: 102/69, AP: 60, R: 16. Pt denies pain on assessment. Incision: well approximated, without signs of infection, and no drainage. Frias catheter patent and draining yellow, clear urine. Fundus midline, firm and U/2. Lochia: Light. Activity: pt remains on bedrest at time of assessment. Infant feeding: mother pumping and bottle feeding babe; formula to supplement until milk supply comes in.     Postpartum care education provided, see Patient Education activity. Patient denies needs. Will monitor.  Nadine Maloney RN

## 2023-02-09 NOTE — ANESTHESIA CARE TRANSFER NOTE
Patient: Tiffanie Alejo    Procedure: Procedure(s):   SECTION with delivery of viable baby girl @ 0850       Diagnosis: History of  section [Z98.891]  Diagnosis Additional Information: No value filed.    Anesthesia Type:   Spinal     Note:    Oropharynx: oropharynx clear of all foreign objects  Level of Consciousness: awake  Oxygen Supplementation: room air    Independent Airway: airway patency satisfactory and stable  Dentition: dentition unchanged  Vital Signs Stable: post-procedure vital signs reviewed and stable  Report to RN Given: handoff report given  Patient transferred to: PACU    Handoff Report: Identifed the Patient, Identified the Reponsible Provider, Reviewed the pertinent medical history, Discussed the surgical course, Reviewed Intra-OP anesthesia mangement and issues during anesthesia, Set expectations for post-procedure period and Allowed opportunity for questions and acknowledgement of understanding      Vitals:  Vitals Value Taken Time   BP     Temp     Pulse     Resp     SpO2         Electronically Signed By: LEILA Alcantara CRNA  2023  9:31 AM

## 2023-02-09 NOTE — ANESTHESIA PROCEDURE NOTES
TAP Procedure Note    Pre-Procedure   Staff -        CRNA: Selina Felder APRN CRNA       Other Anesthesia Staff: Hadley Simeon       Performed By: SRNA and with CRNAs       Procedure performed by resident/fellow/CRNA in presence of a teaching physician.         Location: OR       Procedure Start/Stop Times: 2023 9:12 AM and 2023 9:17 AM       Pre-Anesthestic Checklist: patient identified, IV checked, site marked, risks and benefits discussed, informed consent, monitors and equipment checked, pre-op evaluation, at physician/surgeon's request and post-op pain management  Timeout:       Correct Patient: Yes        Correct Procedure: Yes        Correct Site: Yes        Correct Position: Yes        Correct Laterality: Yes        Site Marked: Yes  Procedure Documentation  Procedure: TAP       Diagnosis: POST  PAIN MANAGEMENT       Laterality: bilateral       Patient Position: sitting       Skin prep: Chloraprep       Needle Type: [employer] needle       Needle Gauge: 20.        Needle Length (Inches): 4        Ultrasound guided       1. Ultrasound was used to identify targeted nerve, plexus, vascular marker, or fascial plane and place a needle adjacent to it in real-time.       2. Ultrasound was used to visualize the spread of anesthetic in close proximity to the above referenced structure.       3. A permanent image is entered into the patient's record.       4. The visualized anatomic structures appeared normal.       5. There were no apparent abnormal pathologic findings.    Assessment/Narrative         The placement was negative for: blood aspirated, painful injection and site bleeding       Paresthesias: No.       Bolus given via needle. no blood aspirated via catheter.        Secured via.        Insertion/Infusion Method: Single Shot       Complications: none       Injection made incrementally with aspirations every 5 mL.    Medication(s) Administered   Bupivacaine 0.25% PF (Infiltration) -  "Infiltration   40 mL - 2/9/2023 9:12:00 AM  Bupivacaine liposome (Exparel) 1.3% LA inj susp (Infiltration) - Infiltration   20 mL - 2/9/2023 9:12:00 AM  Dexamethasone 10 mg/mL PF (Perineural) - Perineural   10 mg - 2/9/2023 9:12:00 AM  Medication Administration Time: 2/9/2023 9:12 AM      FOR [facility]) ONLY:   Pain Team Contact information: please page the Pain Team Via [employer]. Search \"Pain\". During daytime hours, please page the attending first. At night please page the resident first.    "

## 2023-02-09 NOTE — PLAN OF CARE
Scheduled AM C/S Admit Note  Tiffanie Alejo  MRN: [id number]  Gestational Age: 39w1d      Tiffanie Alejo presents for repeat scheduled  section.  Patient denies contractions, bleeding or LOF.     Past Medical History:   Diagnosis Date     Gluten intolerance      Lactose Intolerance 2013     Ovarian cyst 2013     Spondylolisthesis at L5-S1 level     grade 1     Urinary incontinence     urge     Past Surgical History:   Procedure Laterality Date     ADENOIDECTOMY  2010      SECTION N/A 2019    Procedure:  SECTION;  Surgeon: Ankush Godinez MD;  Location: [facility] OR     COLONOSCOPY  2014     ESOPHAGOGASTRODUODENOSCOPY  2014     GI SURGERY  3/7/13    colonoscopy     LAPAROSCOPY DIAGNOSTIC (GENERAL)       TONSILLECTOMY       [place] Teeth Extraction         FHT: 125  Moderate variability    Plan:  - section at 0800.    Patient is alert and oriented X 3, denies any pain. pain. Patient oriented to room, unit, hourly rounding, and plan of care. Call light within reach.  Explained admission packet with patient bill of rights brochure. Will continue to monitor and document as needed.     Inpatient nursing criteria listed below was met:    Health care directives status obtained and documented: Yes  Patient identifies a surrogate decision maker: Yes   If yes, who: Otis Contact Information:[phone]  Vaccine assessment done and vaccines ordered if appropriate. Yes  Clergy visit ordered if patient requests: Yes  Skin issues/needs documented:N/A  Isolation needs addressed, if appropriate: N/A  Fall Prevention (Med and High risk): Care plan updated, Education given and documented and signage used: Yes  Care Plan initiated: Yes  Education Documented (Reminder to educate patient if MRSA is present on admission): Yes  Education Assessment documented:Yes  Patient has discharge needs (If yes, please explain): No

## 2023-02-09 NOTE — PLAN OF CARE
Face to face report given with opportunity to observe patient.    Report given to Padmini Cortez RN   2/9/2023  7:06 AM

## 2023-02-09 NOTE — ANESTHESIA PROCEDURE NOTES
"Intrathecal injection Procedure Note    Pre-Procedure   Staff -        CRNA: Selina Felder APRN CRNA       Other Anesthesia Staff: Hadley Simeon       Performed By: SRNA and with CRNAs       Procedure performed by resident/fellow/CRNA in presence of a teaching physician.         Location: OR       Procedure Start/Stop Times: 2023 8:10 AM and 2023 8:16 AM       Pre-Anesthestic Checklist: patient identified, IV checked, risks and benefits discussed, informed consent, monitors and equipment checked, pre-op evaluation, at physician/surgeon's request and post-op pain management  Timeout:       Correct Patient: Yes        Correct Procedure: Yes        Correct Site: Yes        Correct Position: Yes   Procedure Documentation  Procedure: intrathecal injection       Diagnosis:        Patient Position: sitting       Skin prep: Betadine       Insertion Site: L3-4. (midline approach).       Needle Gauge: 25.        Needle Length (Inches): 3.5        Spinal Needle Type: Caitie tip       Introducer used       Introducer: 20 G       # of attempts: 1 and  # of redirects:  0    Assessment/Narrative         Paresthesias: No.       CSF fluid: clear.    Medication(s) Administered   Medication Administration Time: 2023 8:10 AM      FOR [facility] ([facility] ONLY:   Pain Team Contact information: please page the Pain Team Via [employer]. Search \"Pain\". During daytime hours, please page the attending first. At night please page the resident first.    "

## 2023-02-09 NOTE — ANESTHESIA POSTPROCEDURE EVALUATION
Patient: Tiffanie Alejo    Procedure: Procedure(s):   SECTION with delivery of viable baby girl @ 0850       Anesthesia Type:  Spinal    Note:  Disposition: Inpatient   Postop Pain Control: Uneventful            Sign Out: Well controlled pain   PONV: No   Neuro/Psych: Uneventful            Sign Out: Acceptable/Baseline neuro status   Airway/Respiratory: Uneventful            Sign Out: Acceptable/Baseline resp. status   CV/Hemodynamics: Uneventful            Sign Out: Acceptable CV status; No obvious hypovolemia; No obvious fluid overload   Other NRE: NONE   DID A NON-ROUTINE EVENT OCCUR? No           Last vitals:  Vitals Value Taken Time   /96 23 1025   Temp 97  F (36.1  C) 23 1030   Pulse 64 23 1030   Resp 22 23 1030   SpO2 97 % 23 1042       Electronically Signed By: LEILA MOORE CRNA  2023  1:25 PM

## 2023-02-09 NOTE — PLAN OF CARE
Assessments as charted. B/P: 92/52, T: 97.9, P: 60, R: 20. Rates pain: 2/10, described as lower abdominal/incisional. Incision: Healing well, well approximated, without signs of infection and no drainage. Voiding without difficulty. Fundus midline, firm and U/2. Lochia: light, rubra, no clots. Activity: moving with difficulty due to post op surgical pain. Pt assisted to stand and march at bedside and to ambulate in room. Pt tolerated activity well. Infant feeding: mother plans to pump and bottle feed expressed milk; formula supplementation as needed at present.     Postpartum care education provided, see Patient Education activity. Patient denies needs. Will monitor.  Nadine Maloney RN

## 2023-02-09 NOTE — PLAN OF CARE
Delivered viable Female via repeat  section by Dr. Godinez.     To warmer, stimulated and dried by baby nurse and MD. Poor respiratory effort and heart rate less than 100 bpm; PPV started at 30 seconds of life. Babe with vigorous cry, good respiratory effort and heart rate greater than 100 bpm at two minutes of life.   Dr. Gamboa present at delivery to care for baby.   ID bands placed on baby, mother and dad.   Brought to mother and placed skin-to-skin with RN in attendance.

## 2023-02-09 NOTE — LACTATION NOTE
"                                     INPATIENT LACTATION CONSULT    Tiffanie Alejo                                                                             [id number]    Consultation Date: 2023    Reason for Lactation Referral:routine lactation assessment    Baby's :2023    Baby's Current Age:1 d  Baby's Gestational Age:39w 1d    Provider: Dr. Gamboa      Maternal History  Maternal History:none  Breast Surgery:No  Breast Changes During Pregnancy:Yes  Breast Feeding History:Yes - pumped and bottle fed -  Length of Time: 1 year  Delivery:scheduled r c/s  Maternal Medications: PNV, Pepcid and Tylenol    Maternal Assessment  Breast Size: average  Nipple Appearance - Left: intact  Nipple Appearance - Right: intact  Nipple Erectility - Left: erect with stimulation  Nipple Erectility - Right: erect with stimulation  Areolas Compressibility: soft  Nipple Size: average  Milk Supply: colostrum    Infant Assessment  Birth Weight: 6 lb 13.4oz  Mouth: normal  Palate: normal  Jaw: normal  Tongue: normal  Frenulum: normal  Digital Suck Exam: not assessed    Feeding  Mom planning to exclusively pump and bottle feed babe expressed milk. Pump given and paperwork completed. Will supplement with formula as needed until mature milk is in.       Education  Following education covered with mom. States understanding and denies any questions or concerns.      rooming-in encouraged with explanation of benefits    encourage skin to skin with explanation of benefits    expressed breast milk and lanolin to nipples for healing and comfort as needed    hand expression    handling and storage of expressed milk    frequency of feedings (8-12 times in 24 hr period)    how to tell babe is getting enough    feeing cues    burping techniques    anticipatory guidance for \"baby's second night\"    Feeding Plan  Continue to feed on demand when  elicits feeding cues with deep latch. Strive for 8-12 pumping sessions in a 24hr " period - pump when babe eats. Will attempt to do as many feeding sessions skin to skin as possible.       Lisseth Mccracken RN, RNC-OB, IBCLC  Lactation Consultant  Mark Llanos

## 2023-02-10 ENCOUNTER — ANESTHESIA EVENT (OUTPATIENT)
Dept: ULTRASOUND IMAGING | Facility: HOSPITAL | Age: 33
End: 2023-02-10
Payer: COMMERCIAL

## 2023-02-10 ENCOUNTER — ANESTHESIA (OUTPATIENT)
Dept: ULTRASOUND IMAGING | Facility: HOSPITAL | Age: 33
End: 2023-02-10
Payer: COMMERCIAL

## 2023-02-10 ENCOUNTER — APPOINTMENT (OUTPATIENT)
Dept: ULTRASOUND IMAGING | Facility: HOSPITAL | Age: 33
End: 2023-02-10
Attending: NURSE ANESTHETIST, CERTIFIED REGISTERED
Payer: COMMERCIAL

## 2023-02-10 LAB
HGB BLD-MCNC: 11.8 G/DL (ref 11.7–15.7)
HOLD SPECIMEN: NORMAL
HOLD SPECIMEN: NORMAL
T PALLIDUM AB SER QL: NONREACTIVE

## 2023-02-10 PROCEDURE — 250N000013 HC RX MED GY IP 250 OP 250 PS 637: Performed by: NURSE PRACTITIONER

## 2023-02-10 PROCEDURE — 36415 COLL VENOUS BLD VENIPUNCTURE: CPT | Performed by: OBSTETRICS & GYNECOLOGY

## 2023-02-10 PROCEDURE — 120N000001 HC R&B MED SURG/OB

## 2023-02-10 PROCEDURE — 64488 TAP BLOCK BI INJECTION: CPT | Mod: XU | Performed by: NURSE ANESTHETIST, CERTIFIED REGISTERED

## 2023-02-10 PROCEDURE — 250N000011 HC RX IP 250 OP 636: Performed by: OBSTETRICS & GYNECOLOGY

## 2023-02-10 PROCEDURE — 85018 HEMOGLOBIN: CPT | Performed by: OBSTETRICS & GYNECOLOGY

## 2023-02-10 PROCEDURE — 250N000011 HC RX IP 250 OP 636: Performed by: NURSE ANESTHETIST, CERTIFIED REGISTERED

## 2023-02-10 PROCEDURE — 250N000013 HC RX MED GY IP 250 OP 250 PS 637: Performed by: OBSTETRICS & GYNECOLOGY

## 2023-02-10 PROCEDURE — 250N000009 HC RX 250: Performed by: NURSE ANESTHETIST, CERTIFIED REGISTERED

## 2023-02-10 RX ORDER — DEXAMETHASONE SODIUM PHOSPHATE 10 MG/ML
INJECTION, SOLUTION INTRAMUSCULAR; INTRAVENOUS
Status: COMPLETED | OUTPATIENT
Start: 2023-02-10 | End: 2023-02-10

## 2023-02-10 RX ORDER — LIDOCAINE 40 MG/G
CREAM TOPICAL
Status: DISCONTINUED | OUTPATIENT
Start: 2023-02-10 | End: 2023-02-10

## 2023-02-10 RX ORDER — TRANEXAMIC ACID 10 MG/ML
1 INJECTION, SOLUTION INTRAVENOUS ONCE
Status: DISCONTINUED | OUTPATIENT
Start: 2023-02-10 | End: 2023-02-10

## 2023-02-10 RX ORDER — DEXMEDETOMIDINE HYDROCHLORIDE 4 UG/ML
INJECTION, SOLUTION INTRAVENOUS PRN
Status: DISCONTINUED | OUTPATIENT
Start: 2023-02-10 | End: 2023-02-10

## 2023-02-10 RX ORDER — LORATADINE 10 MG/1
10 TABLET ORAL DAILY
Status: DISCONTINUED | OUTPATIENT
Start: 2023-02-10 | End: 2023-02-11 | Stop reason: HOSPADM

## 2023-02-10 RX ORDER — SODIUM CHLORIDE, SODIUM LACTATE, POTASSIUM CHLORIDE, CALCIUM CHLORIDE 600; 310; 30; 20 MG/100ML; MG/100ML; MG/100ML; MG/100ML
INJECTION, SOLUTION INTRAVENOUS CONTINUOUS
Status: DISCONTINUED | OUTPATIENT
Start: 2023-02-10 | End: 2023-02-10

## 2023-02-10 RX ORDER — BUPIVACAINE HYDROCHLORIDE 2.5 MG/ML
INJECTION, SOLUTION EPIDURAL; INFILTRATION; INTRACAUDAL
Status: COMPLETED | OUTPATIENT
Start: 2023-02-10 | End: 2023-02-10

## 2023-02-10 RX ADMIN — ACETAMINOPHEN 975 MG: 325 TABLET, FILM COATED ORAL at 00:16

## 2023-02-10 RX ADMIN — ACETAMINOPHEN 975 MG: 325 TABLET, FILM COATED ORAL at 14:00

## 2023-02-10 RX ADMIN — IBUPROFEN 800 MG: 200 TABLET, FILM COATED ORAL at 09:06

## 2023-02-10 RX ADMIN — DEXAMETHASONE SODIUM PHOSPHATE 10 MG: 10 INJECTION, SOLUTION INTRAMUSCULAR; INTRAVENOUS at 10:47

## 2023-02-10 RX ADMIN — ACETAMINOPHEN 975 MG: 325 TABLET, FILM COATED ORAL at 06:18

## 2023-02-10 RX ADMIN — SENNOSIDES AND DOCUSATE SODIUM 1 TABLET: 50; 8.6 TABLET ORAL at 20:08

## 2023-02-10 RX ADMIN — IBUPROFEN 800 MG: 200 TABLET, FILM COATED ORAL at 21:16

## 2023-02-10 RX ADMIN — OXYCODONE HYDROCHLORIDE 2.5 MG: 5 TABLET ORAL at 08:06

## 2023-02-10 RX ADMIN — FAMOTIDINE 20 MG: 20 TABLET ORAL at 09:30

## 2023-02-10 RX ADMIN — PRENATAL VITAMINS-IRON FUMARATE 27 MG IRON-FOLIC ACID 0.8 MG TABLET 1 TABLET: at 08:05

## 2023-02-10 RX ADMIN — Medication 40 MCG: at 10:48

## 2023-02-10 RX ADMIN — IBUPROFEN 800 MG: 200 TABLET, FILM COATED ORAL at 15:12

## 2023-02-10 RX ADMIN — LORATADINE 10 MG: 10 TABLET ORAL at 09:30

## 2023-02-10 RX ADMIN — BUPIVACAINE HYDROCHLORIDE 30 ML: 2.5 INJECTION, SOLUTION EPIDURAL; INFILTRATION; INTRACAUDAL at 10:47

## 2023-02-10 RX ADMIN — SENNOSIDES AND DOCUSATE SODIUM 2 TABLET: 50; 8.6 TABLET ORAL at 08:05

## 2023-02-10 RX ADMIN — KETOROLAC TROMETHAMINE 30 MG: 30 INJECTION, SOLUTION INTRAMUSCULAR; INTRAVENOUS at 02:59

## 2023-02-10 RX ADMIN — ACETAMINOPHEN 975 MG: 325 TABLET, FILM COATED ORAL at 20:08

## 2023-02-10 RX ADMIN — ONDANSETRON 4 MG: 4 TABLET, ORALLY DISINTEGRATING ORAL at 17:23

## 2023-02-10 ASSESSMENT — ACTIVITIES OF DAILY LIVING (ADL)
ADLS_ACUITY_SCORE: 20
ADLS_ACUITY_SCORE: 24
ADLS_ACUITY_SCORE: 20
ADLS_ACUITY_SCORE: 20

## 2023-02-10 NOTE — PLAN OF CARE
Face to face report given with opportunity to observe patient.    Report given to Ines Vazquez RN   2/10/2023  7:17 AM

## 2023-02-10 NOTE — PROGRESS NOTES
[facility]  Daily Post-Op Note         Assessment and Plan:    Assessment:   Post-operative day #1  Procedure(s):   SECTION with delivery of viable baby girl @ 0850     Doing well.  Clean wound without signs of infection.  Normal healing wound.  No immediate surgical complications identified.  No excessive bleeding  Pain moderate and does not tolerate narcotics.       Plan:   Ambulate  Advance activity as tolerated  Continue supportive and symptomatic treatment  Pain control measures.  Consider repeat TAP block.            Interval History:   Stable.  Doing well.  Improving slowly.  Pain is reasonably controlled.  No fevers. Breastfeeding via pump and bottle.            Review of Systems:    CONSTITUTIONAL:NEGATIVE  for fever   R: NEGATIVE for significant cough or SOB  CV: NEGATIVE for chest pain, palpitations or peripheral edema  GI: NEGATIVE for vomiting.  Mild nausea.  Passing flatus  : negative for heavy bleeding or clots             Medications:   I have reviewed this patient's current medications          Physical Exam:   Vitals were reviewed  All vitals stable /60   Pulse 60   Temp 98.9  F (37.2  C) (Oral)   Resp 16   LMP 2022   SpO2 97%   Breastfeeding Unknown   FF  Wound clean and dry with minimal or no drainage.  Surrounding skin with minimal erythema.           Data:   All laboratory data related to this surgery reviewed

## 2023-02-10 NOTE — CARE PLAN
Patient has history of not tolerating oral narcotics.  She feels nauseated and usually vomits after narcotic administration. Administered 2.5mg PO oxycodone and patient had nausea 30 minutes later.  Patient is frustrated because pain is still 5/10 with tylenol and ibuprofen routine.  CNP here and suggested a second tap block.  Patient feels she still doesn't want anymore narcotics, but would like to talk to CRNA about tap block vs vistaril or another low dose narcotic.  CRNA called and will be up to consult with patient per Daisy MARS RN

## 2023-02-10 NOTE — PLAN OF CARE
Assessments as charted. B/P: 88/54, T: 97.9, P: 60, R: 18. Rates pain: 0/10. Incision: Healing well, well approximated, and without signs of infection. Frias catheter removed. IV is saline locked. Pt denies nausea. Fundus firm, U/2. Lochia: Light. Activity: normal activity. Infant feeding: Both pumped breastmilk and formula feeding.     Postpartum care education provided, see Patient Education activity. Patient denies needs. Will monitor.  Esthela Vazquez RN

## 2023-02-10 NOTE — ANESTHESIA POSTPROCEDURE EVALUATION
Patient: Tiffanie Alejo    Procedure: * No procedures listed *       Anesthesia Type:  No value filed.    Note:  Disposition: Inpatient   Postop Pain Control: Uneventful            Sign Out: Well controlled pain   PONV: No   Neuro/Psych: Uneventful            Sign Out: Acceptable/Baseline neuro status   Airway/Respiratory: Uneventful            Sign Out: Acceptable/Baseline resp. status   CV/Hemodynamics: Uneventful            Sign Out: Acceptable CV status; No obvious hypovolemia; No obvious fluid overload   Other NRE: NONE   DID A NON-ROUTINE EVENT OCCUR? No           Last vitals:  Vitals Value Taken Time   /63 02/10/23 1113   Temp     Pulse     Resp     SpO2 97 % 02/10/23 1112   Vitals shown include unvalidated device data.    Electronically Signed By: LEILA Roque CRNA  February 10, 2023  11:14 AM

## 2023-02-10 NOTE — ANESTHESIA PREPROCEDURE EVALUATION
Anesthesia Pre-Procedure Evaluation    Patient: Tiffanie Alejo   MRN: [id number] : 1990        Procedure : Procedure(s):   SECTION with delivery of viable baby girl @ 0850          Past Medical History:   Diagnosis Date     Gluten intolerance      Lactose Intolerance 2013     Ovarian cyst 2013     Spondylolisthesis at L5-S1 level     grade 1     Urinary incontinence     urge      Past Surgical History:   Procedure Laterality Date     ADENOIDECTOMY  2010      SECTION N/A 2019    Procedure:  SECTION;  Surgeon: Ankush Godinez MD;  Location: [facility]      SECTION N/A 2023    Procedure:  SECTION with delivery of viable baby girl @ 0850;  Surgeon: Ankush Godinez MD;  Location: [facility] OR     COLONOSCOPY  2014     ESOPHAGOGASTRODUODENOSCOPY  2014     GI SURGERY  3/7/13    colonoscopy     LAPAROSCOPY DIAGNOSTIC (GENERAL)       TONSILLECTOMY       [place] Teeth Extraction        Allergies   Allergen Reactions     Cats      Facial swelling     Wheat Extract      Stomach pains, constipation and diarrhea      Social History     Tobacco Use     Smoking status: Never     Smokeless tobacco: Never   Substance Use Topics     Alcohol use: Yes     Alcohol/week: 1.7 standard drinks     Types: 2 Cans of beer per week     Comment: social - weekly      Wt Readings from Last 1 Encounters:   23 73.3 kg (161 lb 9.6 oz)              OUTSIDE LABS:  CBC:   Lab Results   Component Value Date    WBC 8.1 2023    WBC 9.6 2023    HGB 11.8 02/10/2023    HGB 12.3 2023    HCT 36.4 2023    HCT 37.3 2023     2023     2023     BMP:   Lab Results   Component Value Date     (L) 2023     (L) 10/12/2022    POTASSIUM 4.1 2023    POTASSIUM 3.8 10/12/2022    CHLORIDE 103 2023    CHLORIDE 103 10/12/2022    CO2 21 (L) 2023    CO2 22 10/12/2022    BUN 4.5 (L) 2023    BUN 5.3 (L)  10/12/2022    CR 0.49 (L) 01/19/2023    CR 0.44 (L) 10/12/2022    GLC 71 01/19/2023    GLC 86 10/12/2022     COAGS: No results found for: PTT, INR, FIBR  POC:   Lab Results   Component Value Date    HCG Negative 05/27/2014     HEPATIC:   Lab Results   Component Value Date    ALBUMIN 3.8 01/19/2023    PROTTOTAL 6.2 (L) 01/19/2023    ALT 9 (L) 01/19/2023    AST 16 01/19/2023    ALKPHOS 99 01/19/2023    BILITOTAL 0.4 01/19/2023     OTHER:   Lab Results   Component Value Date    SHANNAN 8.7 01/19/2023    MAG 1.9 10/12/2022    LIPASE 29 01/19/2023       Anesthesia Plan    ASA Status:  2   - Procedure: Procedure only, no anesthetic delivered                    Consents            Postoperative Care            Comments:                LEILA Roque CRNA

## 2023-02-10 NOTE — ANESTHESIA CARE TRANSFER NOTE
Patient: Tiffanie Alejo    Procedure: * No procedures listed *       Diagnosis: * No pre-op diagnosis entered *  Diagnosis Additional Information: No value filed.    Anesthesia Type:   No value filed.     Note:      Level of Consciousness: awake              Patient transferred to: Labor and Delivery    Handoff Report: Identifed the Patient, Identified the Reponsible Provider, Reviewed the pertinent medical history, Discussed the surgical course, Reviewed Intra-OP anesthesia mangement and issues during anesthesia, Set expectations for post-procedure period and Allowed opportunity for questions and acknowledgement of understanding      Vitals:  Vitals Value Taken Time   /63 02/10/23 1113   Temp     Pulse     Resp     SpO2 97 % 02/10/23 1112   Vitals shown include unvalidated device data.    Electronically Signed By: LEILA Roque CRNA  February 10, 2023  11:14 AM

## 2023-02-10 NOTE — ANESTHESIA PROCEDURE NOTES
TAP Procedure Note    Pre-Procedure   Staff -        CRNA: Pito Robins APRN CRNA       Performed By: CRNA       Location: OR       Procedure Start/Stop Times: 2/10/2023 10:47 AM and 2/10/2023 10:53 AM       Pre-Anesthestic Checklist: patient identified, IV checked, site marked, risks and benefits discussed, informed consent, monitors and equipment checked, pre-op evaluation, at physician/surgeon's request and post-op pain management  Timeout:       Correct Patient: Yes        Correct Procedure: Yes        Correct Site: Yes        Correct Position: Yes        Correct Laterality: Yes        Site Marked: Yes  Procedure Documentation  Procedure: TAP       Diagnosis: POST OP PAIN CONTROL       Laterality: bilateral       Patient Position: supine       Skin prep: Chloraprep       Needle Type: insulated and short bevel       Needle Gauge: 20.        Needle Length (Inches): 4        Ultrasound guided       1. Ultrasound was used to identify targeted nerve, plexus, vascular marker, or fascial plane and place a needle adjacent to it in real-time.       2. Ultrasound was used to visualize the spread of anesthetic in close proximity to the above referenced structure.       3. A permanent image is entered into the patient's record.       4. The visualized anatomic structures appeared normal.       5. There were no apparent abnormal pathologic findings.    Assessment/Narrative         The placement was negative for: blood aspirated, painful injection and site bleeding       Paresthesias: No.       Bolus given via needle..        Secured via.        Insertion/Infusion Method: Single Shot       Complications: none       Injection made incrementally with aspirations every 5 mL.    Medication(s) Administered   Bupivacaine 0.25% PF (Infiltration) - Infiltration   30 mL - 2/10/2023 10:47:00 AM  Dexamethasone 10 mg/mL PF (Perineural) - Perineural   10 mg - 2/10/2023 10:47:00 AM  Medication Administration Time: 2/10/2023 10:47  "AM     Comments:  Risks for regional anesthesia include but not limited to: infection, seizures, continued weakness or numbness, pain at injection site, injury to blood vessels      FOR [facility] ([facility]) ONLY:   Pain Team Contact information: please page the Pain Team Via [employer]. Search \"Pain\". During daytime hours, please page the attending first. At night please page the resident first.    "

## 2023-02-11 ENCOUNTER — APPOINTMENT (OUTPATIENT)
Dept: ULTRASOUND IMAGING | Facility: HOSPITAL | Age: 33
End: 2023-02-11
Attending: NURSE ANESTHETIST, CERTIFIED REGISTERED
Payer: COMMERCIAL

## 2023-02-11 ENCOUNTER — ANESTHESIA EVENT (OUTPATIENT)
Dept: ULTRASOUND IMAGING | Facility: HOSPITAL | Age: 33
End: 2023-02-11
Payer: COMMERCIAL

## 2023-02-11 ENCOUNTER — ANESTHESIA (OUTPATIENT)
Dept: ULTRASOUND IMAGING | Facility: HOSPITAL | Age: 33
End: 2023-02-11
Payer: COMMERCIAL

## 2023-02-11 VITALS
HEART RATE: 51 BPM | OXYGEN SATURATION: 99 % | TEMPERATURE: 98.9 F | SYSTOLIC BLOOD PRESSURE: 97 MMHG | RESPIRATION RATE: 16 BRPM | DIASTOLIC BLOOD PRESSURE: 72 MMHG

## 2023-02-11 PROCEDURE — 64488 TAP BLOCK BI INJECTION: CPT | Mod: XU | Performed by: NURSE ANESTHETIST, CERTIFIED REGISTERED

## 2023-02-11 PROCEDURE — 250N000013 HC RX MED GY IP 250 OP 250 PS 637: Performed by: OBSTETRICS & GYNECOLOGY

## 2023-02-11 PROCEDURE — 250N000011 HC RX IP 250 OP 636: Performed by: NURSE ANESTHETIST, CERTIFIED REGISTERED

## 2023-02-11 PROCEDURE — 250N000013 HC RX MED GY IP 250 OP 250 PS 637: Performed by: NURSE PRACTITIONER

## 2023-02-11 RX ORDER — IBUPROFEN 800 MG/1
800 TABLET, FILM COATED ORAL EVERY 6 HOURS
Qty: 40 TABLET | Refills: 1 | Status: SHIPPED | OUTPATIENT
Start: 2023-02-11 | End: 2023-02-11

## 2023-02-11 RX ORDER — BUPIVACAINE HYDROCHLORIDE 2.5 MG/ML
INJECTION, SOLUTION EPIDURAL; INFILTRATION; INTRACAUDAL
Status: COMPLETED | OUTPATIENT
Start: 2023-02-11 | End: 2023-02-11

## 2023-02-11 RX ORDER — IBUPROFEN 800 MG/1
800 TABLET, FILM COATED ORAL EVERY 6 HOURS
Qty: 40 TABLET | Refills: 1 | Status: SHIPPED | OUTPATIENT
Start: 2023-02-11

## 2023-02-11 RX ORDER — OXYCODONE HYDROCHLORIDE 5 MG/1
2.5 TABLET ORAL EVERY 6 HOURS PRN
Qty: 12 TABLET | Refills: 0 | Status: SHIPPED | OUTPATIENT
Start: 2023-02-11 | End: 2023-02-14

## 2023-02-11 RX ORDER — OXYCODONE HYDROCHLORIDE 5 MG/1
2.5 TABLET ORAL EVERY 4 HOURS PRN
Qty: 12 TABLET | Refills: 0 | Status: SHIPPED | OUTPATIENT
Start: 2023-02-11 | End: 2023-02-16

## 2023-02-11 RX ORDER — DEXAMETHASONE SODIUM PHOSPHATE 10 MG/ML
INJECTION, SOLUTION INTRAMUSCULAR; INTRAVENOUS
Status: COMPLETED | OUTPATIENT
Start: 2023-02-11 | End: 2023-02-11

## 2023-02-11 RX ADMIN — ACETAMINOPHEN 975 MG: 325 TABLET, FILM COATED ORAL at 07:46

## 2023-02-11 RX ADMIN — FAMOTIDINE 20 MG: 20 TABLET ORAL at 08:10

## 2023-02-11 RX ADMIN — IBUPROFEN 800 MG: 200 TABLET, FILM COATED ORAL at 15:42

## 2023-02-11 RX ADMIN — SENNOSIDES AND DOCUSATE SODIUM 2 TABLET: 50; 8.6 TABLET ORAL at 08:09

## 2023-02-11 RX ADMIN — DEXAMETHASONE SODIUM PHOSPHATE 10 MG: 10 INJECTION, SOLUTION INTRAMUSCULAR; INTRAVENOUS at 09:00

## 2023-02-11 RX ADMIN — ACETAMINOPHEN 975 MG: 325 TABLET, FILM COATED ORAL at 13:57

## 2023-02-11 RX ADMIN — LORATADINE 10 MG: 10 TABLET ORAL at 08:10

## 2023-02-11 RX ADMIN — PRENATAL VITAMINS-IRON FUMARATE 27 MG IRON-FOLIC ACID 0.8 MG TABLET 1 TABLET: at 08:09

## 2023-02-11 RX ADMIN — BUPIVACAINE HYDROCHLORIDE 40 ML: 2.5 INJECTION, SOLUTION EPIDURAL; INFILTRATION; INTRACAUDAL at 09:00

## 2023-02-11 RX ADMIN — IBUPROFEN 800 MG: 200 TABLET, FILM COATED ORAL at 09:16

## 2023-02-11 RX ADMIN — BENZOCAINE 6 MG-MENTHOL 10 MG LOZENGES 1 LOZENGE: at 08:29

## 2023-02-11 RX ADMIN — IBUPROFEN 800 MG: 200 TABLET, FILM COATED ORAL at 02:58

## 2023-02-11 ASSESSMENT — ACTIVITIES OF DAILY LIVING (ADL)
ADLS_ACUITY_SCORE: 20

## 2023-02-11 NOTE — PLAN OF CARE
[facility] is closed after 1pm today.   will E-scribe them to [facility].  Writer called Sandra and left message on pharmacist line to cancel RXs!  SHRUTHI MARS RN

## 2023-02-11 NOTE — PLAN OF CARE
Assessments as charted. B/P: 97/72, T: 98.9, P: 51, R: 16. Rates pain: 3/10 while in bed not moving, goes up to 5/10 when up walking. Incision: Healing well, without signs of infection, and no drainage. Voiding without difficulty. Fundus Firm 3 below umbilicus. Lochia: Light. Activity: moving with difficulty due to post op surgical pain. Patient requested  anesthesia consultation for another tap block prior to discharge home.  MD & Anesthesia aware of consult.  Infant feeding: Pumping and feeding breast milk via bottle along with supplementation of formula at this time.       Postpartum assessment completed and education provided, see Patient Education Activity.  Items included in the education are:  effectiveness of pumping  manual expression  handling and storing breastmilk  maintenance of breastfeeding for the first 6 months  sign/symptoms of infant feeding issues requiring referral to qualified health care provider  Postpartum care education provided, see Patient Education activity. Patient denies needs. Will monitor.  SHRUTHI MARS RN

## 2023-02-11 NOTE — PLAN OF CARE
Goal Outcome Evaluation:                      Assessments as charted. B/P: 100/63, T: 98, P: 51, R: 16. Rates pain: 1/10 incisional. Incision: Healing well, well approximated, without signs of infection, and no drainage. Voiding without difficulty. Fundus U-2/ FIRM. Lochia: Light. Activity: unrestricted with out pain. Infant feeding: Both breast and formula FEEDING GOING WELL.           Postpartum breastfeeding assessment completed and education provided, see Patient Education Activity.  Items included in the education are:   proper positioning and latch  effectiveness of feeding  manual expression  handling and storing breastmilk  maintenance of breastfeeding for the first 6 months  sign/symptoms of infant feeding issues requiring referral to qualified health care provider  Postpartum care education provided, see Patient Education activity. Patient denies needs. Will monitor.  Isaias Fitzgerald RN

## 2023-02-11 NOTE — PLAN OF CARE
Goal Outcome Evaluation:                      Face to face report given with opportunity to observe patient.    Report given to rodrigue Fitzgerald RN   2/11/2023  7:10 AM

## 2023-02-11 NOTE — ANESTHESIA CARE TRANSFER NOTE
Patient: Tiffanie Alejo    Procedure: * No procedures listed *       Diagnosis: * No pre-op diagnosis entered *  Diagnosis Additional Information: No value filed.    Anesthesia Type:   No value filed.     Note:    Oropharynx: spontaneously breathing  Level of Consciousness: awake  Oxygen Supplementation: room air    Independent Airway: airway patency satisfactory and stable  Dentition: dentition unchanged  Vital Signs Stable: post-procedure vital signs reviewed and stable  Report to RN Given: handoff report given            Vitals:  Vitals Value Taken Time   BP     Temp     Pulse     Resp     SpO2         Electronically Signed By: LEILA Roque CRNA  February 11, 2023  9:14 AM

## 2023-02-11 NOTE — DISCHARGE INSTRUCTIONS
Pelvic rest for 4 weeks.  No lifting greater than 15-20 lbs 6 weeks.    No vigorous activity, exercise, swim, bath, for 4 weeks.    Schedule Postpartum check 6 weeks Dr. Godinez or Daisy Schilling NP  Schedule PO check 2 weeks Dr. Godinez  Call Dr. Godinez [phone] as necessary if problems in interim

## 2023-02-11 NOTE — ANESTHESIA POSTPROCEDURE EVALUATION
Patient: Tiffanie Alejo    Procedure: * No procedures listed *       Anesthesia Type:  No value filed.    Note:  Disposition: Inpatient   Postop Pain Control:    PONV:    Neuro/Psych:    Airway/Respiratory:    CV/Hemodynamics:    Other NRE:    DID A NON-ROUTINE EVENT OCCUR?            Last vitals:  Vitals Value Taken Time   BP     Temp     Pulse     Resp     SpO2         Electronically Signed By: LEILA Roque CRNA  February 11, 2023  9:15 AM

## 2023-02-11 NOTE — DISCHARGE SUMMARY
Discharge Summary    Tiffanie Alejo MRN# [id number]   YOB: 1990 Age: 32 year old     Date of Admission:  2023  Date of Discharge:  2023  Admitting Physician:  Ankush Godinez MD  Discharge Physician:  Ankush Godinez MD  Discharging Service:  Obstetrics and Gynecology     Primary Provider: Pinky Medel          Admission Diagnoses:   History of  section [Z98.891]   delivery delivered [O82]          Discharge Diagnosis:     Term IUP, Delivered          Discharge Disposition:   Discharged to home           Condition on Discharge:   Discharge condition: Stable   Discharge vitals: Blood pressure 97/72, pulse 51, temperature 98.9  F (37.2  C), temperature source Oral, resp. rate 16, last menstrual period 2022, SpO2 99 %, unknown if currently breastfeeding.   Code status on discharge: Full Code           Procedures / Labs / Imaging:   Repeat LTCS 2023      She received repeat Tap blocks on POD#1 and 2 for PO pain control.               Medications Prior to Admission:     Medications Prior to Admission   Medication Sig Dispense Refill Last Dose     acetaminophen (TYLENOL) 500 MG tablet Take 500-1,000 mg by mouth every 6 hours as needed for mild pain   Past Week     famotidine (PEPCID) 20 MG tablet Take 20 mg by mouth daily   Past Week     Prenatal Vit-Fe Fumarate-FA (PRENATAL VITAMINS PO) Take 1 tablet by mouth daily   Past Week     docusate sodium (COLACE) 100 MG capsule Take 100 mg by mouth 2 times daily        metoclopramide (REGLAN) 10 MG tablet Take 1 tablet (10 mg) by mouth 3 times daily as needed (nausea/headache) 30 tablet 1      [DISCONTINUED] cetirizine (ZYRTEC) 10 MG tablet Take 1 tablet (10 mg) by mouth daily (Patient not taking: Reported on 10/18/2022) 30 tablet 1      [DISCONTINUED] cholestyramine (QUESTRAN) 4 g packet Take 1 packet by mouth as needed (Patient not taking: Reported on 2022)        [DISCONTINUED] EPINEPHrine (ANY BX  GENERIC EQUIV) 0.3 MG/0.3ML injection 2-pack Inject 0.3 mLs (0.3 mg) into the muscle once as needed for anaphylaxis (Patient not taking: Reported on 2022) 1 each 0      [DISCONTINUED] magnesium 250 MG tablet Take 1 tablet by mouth daily (Patient not taking: Reported on 2022)        [DISCONTINUED] ondansetron (ZOFRAN ODT) 4 MG ODT tab Take 1 tablet (4 mg) by mouth every 8 hours as needed for nausea 40 tablet 1              Discharge Medications:     Current Discharge Medication List      START taking these medications    Details   ibuprofen (ADVIL/MOTRIN) 800 MG tablet Take 1 tablet (800 mg) by mouth every 6 hours  Qty: 40 tablet, Refills: 1    Associated Diagnoses:  delivery delivered      oxyCODONE (ROXICODONE) 5 MG tablet Take 0.5 tablets (2.5 mg) by mouth every 6 hours as needed for breakthrough pain  Qty: 12 tablet, Refills: 0    Associated Diagnoses:  delivery delivered         CONTINUE these medications which have NOT CHANGED    Details   acetaminophen (TYLENOL) 500 MG tablet Take 500-1,000 mg by mouth every 6 hours as needed for mild pain      famotidine (PEPCID) 20 MG tablet Take 20 mg by mouth daily      Prenatal Vit-Fe Fumarate-FA (PRENATAL VITAMINS PO) Take 1 tablet by mouth daily      docusate sodium (COLACE) 100 MG capsule Take 100 mg by mouth 2 times daily      metoclopramide (REGLAN) 10 MG tablet Take 1 tablet (10 mg) by mouth 3 times daily as needed (nausea/headache)  Qty: 30 tablet, Refills: 1    Associated Diagnoses: Vomiting affecting pregnancy         STOP taking these medications       cetirizine (ZYRTEC) 10 MG tablet Comments:   Reason for Stopping:         cholestyramine (QUESTRAN) 4 g packet Comments:   Reason for Stopping:         EPINEPHrine (ANY BX GENERIC EQUIV) 0.3 MG/0.3ML injection 2-pack Comments:   Reason for Stopping:         magnesium 250 MG tablet Comments:   Reason for Stopping:         ondansetron (ZOFRAN ODT) 4 MG ODT tab Comments:   Reason for  Stopping:                     Consultations:   No consultations were requested during this admission             Brief History of Illness:   Tiffanie Alejo is a 32 year old female who was admitted for repeat  section, sheduled.           Hospital Course:   Repeat LTCS 2023 Frias catheter removed on pod#0 and diet advanced.  Patient was afebrile throughout hospitalization and postoperative course was uncomplicated.  She received repeat Tap blocks on POD#1 and 2 for PO pain control.          Significant Results:   None             Pending Results:   None           Discharge Instructions and Follow-Up:   Discharge diet: Regular   Discharge activity: Lifting restricted to 20 pounds for 6 weeks   Discharge follow-up: Follow up with me in 2 weeks   Wound care: Keep clean and dry   Other instructions: None

## 2023-02-11 NOTE — ANESTHESIA PREPROCEDURE EVALUATION
Anesthesia Pre-Procedure Evaluation    Patient: Tiffanie Alejo   MRN: [id number] : 1990        Procedure : Procedure(s):   SECTION with delivery of viable baby girl @ 0850          Past Medical History:   Diagnosis Date     Gluten intolerance      Lactose Intolerance 2013     Ovarian cyst 2013     Spondylolisthesis at L5-S1 level     grade 1     Urinary incontinence     urge      Past Surgical History:   Procedure Laterality Date     ADENOIDECTOMY  2010      SECTION N/A 2019    Procedure:  SECTION;  Surgeon: Ankush Godinez MD;  Location: [facility]      SECTION N/A 2023    Procedure:  SECTION with delivery of viable baby girl @ 0850;  Surgeon: Ankush Godinez MD;  Location: [facility]     COLONOSCOPY  2014     ESOPHAGOGASTRODUODENOSCOPY  2014     GI SURGERY  3/7/13    colonoscopy     LAPAROSCOPY DIAGNOSTIC (GENERAL)       TONSILLECTOMY       [place] Teeth Extraction        Allergies   Allergen Reactions     Cats      Facial swelling     Wheat Extract      Stomach pains, constipation and diarrhea      Social History     Tobacco Use     Smoking status: Never     Smokeless tobacco: Never   Substance Use Topics     Alcohol use: Yes     Alcohol/week: 1.7 standard drinks     Types: 2 Cans of beer per week     Comment: social - weekly      Wt Readings from Last 1 Encounters:   23 73.3 kg (161 lb 9.6 oz)              OUTSIDE LABS:  CBC:   Lab Results   Component Value Date    WBC 8.1 2023    WBC 9.6 2023    HGB 11.8 02/10/2023    HGB 12.3 2023    HCT 36.4 2023    HCT 37.3 2023     2023     2023     BMP:   Lab Results   Component Value Date     (L) 2023     (L) 10/12/2022    POTASSIUM 4.1 2023    POTASSIUM 3.8 10/12/2022    CHLORIDE 103 2023    CHLORIDE 103 10/12/2022    CO2 21 (L) 2023    CO2 22 10/12/2022    BUN 4.5 (L) 2023    BUN 5.3 (L)  10/12/2022    CR 0.49 (L) 01/19/2023    CR 0.44 (L) 10/12/2022    GLC 71 01/19/2023    GLC 86 10/12/2022     COAGS: No results found for: PTT, INR, FIBR  POC:   Lab Results   Component Value Date    HCG Negative 05/27/2014     HEPATIC:   Lab Results   Component Value Date    ALBUMIN 3.8 01/19/2023    PROTTOTAL 6.2 (L) 01/19/2023    ALT 9 (L) 01/19/2023    AST 16 01/19/2023    ALKPHOS 99 01/19/2023    BILITOTAL 0.4 01/19/2023     OTHER:   Lab Results   Component Value Date    SHANNAN 8.7 01/19/2023    MAG 1.9 10/12/2022    LIPASE 29 01/19/2023       Anesthesia Plan    ASA Status:  2   - Procedure: Procedure only, no anesthetic delivered                    Consents            Postoperative Care            Comments:                LEILA Roque CRNA

## 2023-02-11 NOTE — CARE PLAN
Patient discharged at 4:15 PM via ambulation accompanied by spouse and staff. Prescriptions sent to patients preferred pharmacy. All belongings sent with patient.     Discharge instructions reviewed with patient. AVS given to patient.  Patient verbalizes understanding of discharge instructions and follow up care.  Postpartum hand book given for reference as well.  Room clear of belongings.    Patient discharged to home.     Pneumonia and Influenza given prior to discharge, if indicated: N/A    Surgical Patient   Surgical Procedures during stay: YES  Did patient receive discharge instruction on wound care and recognition of infection symptoms? Yes    MISC  Follow up appointment made:  Yes  Home and hospital aquired medications returned to patient: N/A  Patient reports pain was well managed at discharge: Yes   SHRUTHI MARS RN

## 2023-02-11 NOTE — ANESTHESIA PROCEDURE NOTES
TAP Procedure Note    Pre-Procedure   Staff -        CRNA: Pito Robins APRN CRNA       Performed By: CRNA       Location: OR       Procedure Start/Stop Times: 2/11/2023 9:00 AM and 2/11/2023 9:10 AM       Pre-Anesthestic Checklist: patient identified, IV checked, site marked, risks and benefits discussed, informed consent, monitors and equipment checked, pre-op evaluation, at physician/surgeon's request and post-op pain management  Timeout:       Correct Patient: Yes        Correct Procedure: Yes        Correct Site: Yes        Correct Position: Yes        Correct Laterality: Yes        Site Marked: Yes  Procedure Documentation  Procedure: TAP       Diagnosis: POST OP PAIN CONTROL       Laterality: bilateral       Patient Position: supine       Skin prep: Chloraprep       Needle Type: insulated and short bevel       Needle Gauge: 20.        Needle Length (Inches): 4        Ultrasound guided       1. Ultrasound was used to identify targeted nerve, plexus, vascular marker, or fascial plane and place a needle adjacent to it in real-time.       2. Ultrasound was used to visualize the spread of anesthetic in close proximity to the above referenced structure.       3. A permanent image is entered into the patient's record.       4. The visualized anatomic structures appeared normal.       5. There were no apparent abnormal pathologic findings.    Assessment/Narrative         The placement was negative for: blood aspirated, painful injection and site bleeding       Paresthesias: No.       Bolus given via needle..        Secured via.        Insertion/Infusion Method: Single Shot       Complications: none       Injection made incrementally with aspirations every 5 mL.    Medication(s) Administered   Bupivacaine 0.25% PF (Infiltration) - Infiltration   40 mL - 2/11/2023 9:00:00 AM  Dexamethasone 10 mg/mL PF (Perineural) - Perineural   10 mg - 2/11/2023 9:00:00 AM  Medication Administration Time: 2/11/2023 9:00 AM    "  Comments:  Risks for regional anesthesia include but not limited to: infection, seizures, continued weakness or numbness, pain at injection site, injury to blood vessels  Dose divided in half for each side      FOR [facility] ([facility] ONLY:   Pain Team Contact information: please page the Pain Team Via [employer]. Search \"Pain\". During daytime hours, please page the attending first. At night please page the resident first.    "

## 2023-02-14 ENCOUNTER — MYC MEDICAL ADVICE (OUTPATIENT)
Dept: OBGYN | Facility: OTHER | Age: 33
End: 2023-02-14

## 2023-02-14 NOTE — TELEPHONE ENCOUNTER
OK,  its too soon to remove that at this point but we may be able to trim it so it isn't poking out.  Keeping a band aid over it is good until then.    I will have my nurse call to see if you can be seen this week and we can possibly trim that.       Can you see if Daisy can see her this week for PO incision check and schedule?

## 2023-02-16 ENCOUNTER — OFFICE VISIT (OUTPATIENT)
Dept: OBGYN | Facility: OTHER | Age: 33
End: 2023-02-16
Attending: NURSE PRACTITIONER
Payer: COMMERCIAL

## 2023-02-16 VITALS
HEART RATE: 97 BPM | DIASTOLIC BLOOD PRESSURE: 68 MMHG | HEIGHT: 62 IN | WEIGHT: 152.6 LBS | BODY MASS INDEX: 28.08 KG/M2 | SYSTOLIC BLOOD PRESSURE: 101 MMHG | OXYGEN SATURATION: 98 %

## 2023-02-16 PROCEDURE — 99207 PR NO CHARGE LOS: CPT | Performed by: NURSE PRACTITIONER

## 2023-02-16 ASSESSMENT — PAIN SCALES - GENERAL: PAINLEVEL: MILD PAIN (2)

## 2023-02-16 NOTE — PROGRESS NOTES
"SUBJECTIVE:  Tiffanie Alejo is a 32 year old female P2 here for a 1 week incision check and postpartum visit.  She had a  Section  on 23 delivering a healthy baby girl weighing 6 lbs 13 oz at term.      Today's Depression Rating was 3    delivery complications:  No  breast feeding:  Yes, pumping and bottle feeding.  Doing well with adequate supply and no concerns.  bladder problems:  No  bowel problems/hemorrhoids:  No  episiotomy/laceration/incision healed? Yes: denies redness, pain, or discharge.  vaginal flow:  light  [place]:  No  emotional adjustment:  doing well and happy      OBJECTIVE:  Blood pressure 101/68, pulse 97, height 1.575 m (5' 2\"), weight 69.2 kg (152 lb 9.6 oz), last menstrual period 2022, SpO2 98 %, unknown if currently breastfeeding.   General - pleasant female in no acute distress.  Abdomen - soft, nontender, nondistended, FF.  Incision CDI      ASSESSMENT:  normal postpartum and incision check    PLAN:  Continue with post op restrictions  Breastfeeding anticipatory guidance.  Signs of PPD reviewed.  Return at 6 weeks as scheduled.   "

## 2023-02-23 ENCOUNTER — OFFICE VISIT (OUTPATIENT)
Dept: FAMILY MEDICINE | Facility: OTHER | Age: 33
End: 2023-02-23
Attending: FAMILY MEDICINE
Payer: COMMERCIAL

## 2023-02-23 DIAGNOSIS — Z78.9 BREASTFEEDING (INFANT): ICD-10-CM

## 2023-02-23 PROCEDURE — 99213 OFFICE O/P EST LOW 20 MIN: CPT | Performed by: FAMILY MEDICINE

## 2023-02-23 RX ORDER — PROGESTERONE 100 MG/1
100 CAPSULE ORAL AT BEDTIME
Qty: 10 CAPSULE | Refills: 0 | Status: SHIPPED | OUTPATIENT
Start: 2023-02-23 | End: 2023-03-30

## 2023-02-23 ASSESSMENT — ANXIETY QUESTIONNAIRES
7. FEELING AFRAID AS IF SOMETHING AWFUL MIGHT HAPPEN: NOT AT ALL
IF YOU CHECKED OFF ANY PROBLEMS ON THIS QUESTIONNAIRE, HOW DIFFICULT HAVE THESE PROBLEMS MADE IT FOR YOU TO DO YOUR WORK, TAKE CARE OF THINGS AT HOME, OR GET ALONG WITH OTHER PEOPLE: NOT DIFFICULT AT ALL
4. TROUBLE RELAXING: NOT AT ALL
1. FEELING NERVOUS, ANXIOUS, OR ON EDGE: SEVERAL DAYS
2. NOT BEING ABLE TO STOP OR CONTROL WORRYING: NOT AT ALL
GAD7 TOTAL SCORE: 2
5. BEING SO RESTLESS THAT IT IS HARD TO SIT STILL: NOT AT ALL
3. WORRYING TOO MUCH ABOUT DIFFERENT THINGS: NOT AT ALL
6. BECOMING EASILY ANNOYED OR IRRITABLE: SEVERAL DAYS
GAD7 TOTAL SCORE: 2

## 2023-02-23 ASSESSMENT — PATIENT HEALTH QUESTIONNAIRE - PHQ9: SUM OF ALL RESPONSES TO PHQ QUESTIONS 1-9: 2

## 2023-02-23 NOTE — PROGRESS NOTES
"  Assessment & Plan     Post partum depression  Start nightly for 10 nights and follow-up evisit if needed  - progesterone (PROMETRIUM) 100 MG capsule; Take 1 capsule (100 mg) by mouth At Bedtime    Breastfeeding (infant)  Take weekly for 3 months  - vitamin D3 (CHOLECALCIFEROL) 1.25 MG (19966 UT) capsule; Take 1 capsule (50,000 Units) by mouth every 7 days    Provider  Link to [facility] Help Grid :[id number]}     BMI:   Estimated body mass index is 27.91 kg/m  as calculated from the following:    Height as of 2/16/23: 1.575 m (5' 2\").    Weight as of 2/16/23: 69.2 kg (152 lb 9.6 oz).   Weight management plan: Discussed healthy diet and exercise guidelines    Patient was agreeable to this plan and had no further questions.  There are no Patient Instructions on file for this visit.    No follow-ups on file.    Pinky Medel MD  [facility] Moreno is a 32 year old accompanied by her spouse, presenting for the following health issues:  Postpartum Care      HPI     Postpartum Care  Concern - mood changes  Onset: 2 weeks  Description: post partum 2 wks  Intensity: mild, moderate  Progression of Symptoms:  waxing and waning  Accompanying Signs & Symptoms: tearful at times  Previous history of similar problem: yes  Precipitating factors:        Worsened by: hormone fluctuance  Alleviating factors:        Improved by: time and space  Therapies tried and outcome:  none       Review of Systems   Constitutional, HEENT, cardiovascular, pulmonary, gi and gu systems are negative, except as otherwise noted.      Objective    LMP 05/11/2022   There is no height or weight on file to calculate BMI.  Physical Exam   GENERAL: healthy, alert and no distress  RESP: lungs clear to auscultation - no rales, rhonchi or wheezes  CV: regular rate and rhythm, normal S1 S2, no S3 or S4, no murmur, click or rub, no peripheral edema and peripheral pulses strong  PSYCH: mentation appears normal, affect " normal/bright    No results found for any visits on 02/23/23.

## 2023-02-24 ENCOUNTER — MYC MEDICAL ADVICE (OUTPATIENT)
Dept: OBGYN | Facility: OTHER | Age: 33
End: 2023-02-24

## 2023-02-24 DIAGNOSIS — Z98.891 HISTORY OF CESAREAN SECTION: Primary | ICD-10-CM

## 2023-02-24 NOTE — TELEPHONE ENCOUNTER
Yes looks a little red, possble early infection.  Would recommend we try antibiotic and then I see you next week for an incision check.  I put in a prescription to [facility]  for Augmentin twice daily  which is safe with breast feeding.    Please schedule PO check next week.

## 2023-03-03 ENCOUNTER — OFFICE VISIT (OUTPATIENT)
Dept: OBGYN | Facility: OTHER | Age: 33
End: 2023-03-03
Attending: OBSTETRICS & GYNECOLOGY
Payer: COMMERCIAL

## 2023-03-03 VITALS
OXYGEN SATURATION: 97 % | HEIGHT: 62 IN | BODY MASS INDEX: 27.23 KG/M2 | WEIGHT: 148 LBS | TEMPERATURE: 98.6 F | SYSTOLIC BLOOD PRESSURE: 122 MMHG | DIASTOLIC BLOOD PRESSURE: 68 MMHG | HEART RATE: 90 BPM

## 2023-03-03 DIAGNOSIS — Z98.890 POST-OPERATIVE STATE: ICD-10-CM

## 2023-03-03 DIAGNOSIS — Z30.011 ENCOUNTER FOR INITIAL PRESCRIPTION OF CONTRACEPTIVE PILLS: Primary | ICD-10-CM

## 2023-03-03 PROCEDURE — 99207 PR NO CHARGE LOS: CPT | Performed by: OBSTETRICS & GYNECOLOGY

## 2023-03-03 RX ORDER — ACETAMINOPHEN AND CODEINE PHOSPHATE 120; 12 MG/5ML; MG/5ML
0.35 SOLUTION ORAL DAILY
Qty: 90 TABLET | Refills: 3 | Status: SHIPPED | OUTPATIENT
Start: 2023-03-03 | End: 2024-01-29

## 2023-03-03 ASSESSMENT — PAIN SCALES - GENERAL: PAINLEVEL: MILD PAIN (2)

## 2023-03-03 NOTE — PROGRESS NOTES
"SUBJECTIVE:  Tiffanie Alejo is a 32 year old female P2 here for a postpartum visit.  She had a  Section   delivering a healthy baby girl  Currently no complaints and doing well.  She was treated for possible early wound infection with Augmenting with subsequent improvement/resolution.   Today's Depression Rating was 5    delivery complications:  No  breast feeding:  Yes  bladder problems:  No  Some bladder spasm  bowel problems/hemorrhoids:  No  episiotomy/laceration/incision healed? No  vaginal flow:  None  Benld:  No  contraception:  abstinence  emotional adjustment:  doing well and happy      OBJECTIVE:  Blood pressure 122/68, pulse 90, temperature 98.6  F (37  C), temperature source Tympanic, height 1.575 m (5' 2\"), weight 67.1 kg (148 lb), last menstrual period 2022, SpO2 97 %, currently breastfeeding.   General - pleasant female in no acute distress.  Incision:  Clean, dry, intact.  No erythema or drainage.    Abdomen - soft, nontender, nondistended, no hepatosplenomegaly.  Incision:  Clean, dry, intact.  No erythema or drainage.    Rectovaginal - deferred.    ASSESSMENT:  normal postpartum exam.  Released from pregnancy related restrictions    PLAN:  Continue PO restrictions, f/u 3 wks for 6 wk PP exam.     The patient will use oral contraceptive for birth control. Full counseling was provided, and all questions answered. Compliance is strongly emphasized.      Ankush Godinez MD  "

## 2023-03-08 ENCOUNTER — MYC MEDICAL ADVICE (OUTPATIENT)
Dept: OBGYN | Facility: OTHER | Age: 33
End: 2023-03-08

## 2023-03-08 NOTE — TELEPHONE ENCOUNTER
Tiffanie,It doesn't look infected currently.  I would at this point try to clean it up a bit and rub some of the glue off in shower and then keep clean/dry or covered with dry gauze if draining a bit.    As long as drainage clear or straw colored would keep an eye on it.  If it starts to look thick yellow/gree or increasing redness/pain/fever then please let us know or come in to urgent care if after hours.

## 2023-03-23 ENCOUNTER — PRENATAL OFFICE VISIT (OUTPATIENT)
Dept: OBGYN | Facility: OTHER | Age: 33
End: 2023-03-23
Attending: OBSTETRICS & GYNECOLOGY
Payer: COMMERCIAL

## 2023-03-23 VITALS
DIASTOLIC BLOOD PRESSURE: 62 MMHG | BODY MASS INDEX: 26.5 KG/M2 | HEART RATE: 69 BPM | SYSTOLIC BLOOD PRESSURE: 100 MMHG | WEIGHT: 144 LBS | HEIGHT: 62 IN | OXYGEN SATURATION: 98 %

## 2023-03-23 DIAGNOSIS — Z98.890 POST-OPERATIVE STATE: Primary | ICD-10-CM

## 2023-03-23 PROCEDURE — 99207 PR POST PARTUM EXAM: CPT | Performed by: OBSTETRICS & GYNECOLOGY

## 2023-03-23 ASSESSMENT — PAIN SCALES - GENERAL: PAINLEVEL: NO PAIN (0)

## 2023-03-23 NOTE — PROGRESS NOTES
"SUBJECTIVE:  Tiffanie Alejo is a 32 year old female P2 here for a postpartum visit.  She had a  Section   delivering a healthy baby girl  Currently no complaints and doing well.    Today's Depression Rating was 5    delivery complications:  No  breast feeding:  Yes  bladder problems:  No  bowel problems/hemorrhoids:  No  episiotomy/laceration/incision healed? No  vaginal flow:  None  [place]:  No  contraception:  oral contraceptive  emotional adjustment:  doing well and happy      OBJECTIVE:  Blood pressure 100/62, pulse 69, height 1.575 m (5' 2\"), weight 65.3 kg (144 lb), last menstrual period 2022, SpO2 98 %, currently breastfeeding.   General - pleasant female in no acute distress.  Abdomen - soft, nontender, nondistended, no hepatosplenomegaly.  Incision:  Clean, dry.   No erythema or drainage.  Slight superficial dehiscence on left approximately 1.5- cm wide. .   Rectovaginal - deferred.    ASSESSMENT:  normal postpartum exam.  Released from pregnancy related restrictions    Slight superficial wound  Dehiscence.  Wound care discussed.  No evidence infection and should heal well by secondary intention.  Call prn if non healing/redenss/drainage etc.     PLAN:  May resume normal activities without restrictions  Pap smear Not Done today    The patient will use oral contraceptive for birth control.  Compliance is strongly emphasized.  Return to clinic in one year for an annual, when due for a pap smear or PRN.    Ankush Godinez MD  "

## 2023-03-24 ENCOUNTER — MYC MEDICAL ADVICE (OUTPATIENT)
Dept: FAMILY MEDICINE | Facility: OTHER | Age: 33
End: 2023-03-24

## 2023-03-29 NOTE — PROGRESS NOTES
Assessment & Plan     Post partum depression  Lengthy discussion regarding medication, progesterone supplementation, therapy  We agree she does not need medication at this point but would strongly recommend therapy and names and numbers given  In the meantime we will do 10 days of Prometrium which should be fine with her norethindrone  Discussion regarding her 'mom guilt,' discernment on whether or not she would like to stay home or work part-time  As well as questions answered about son's development and school readiness  Follow-up 6-week, sooner if problems  - TSH with free T4 reflex; Future  - progesterone (PROMETRIUM) 100 MG capsule; Take 1 capsule (100 mg) by mouth At Bedtime  - TSH with free T4 reflex    Hypovitaminosis D  Labs  - Vitamin D Deficiency; Future  - Vitamin D Deficiency    Other fatigue  Due to being up with a  but will check on other chemistries to ensure no physical etiology  - Hemoglobin; Future  - Iron and iron binding capacity; Future  - Ferritin; Future  - Ferritin  - Iron and iron binding capacity  - Hemoglobin    40 minutes spent by me on the date of the encounter doing chart review, history and exam, documentation and further activities per the note       Patient was agreeable to this plan and had no further questions.  Patient Instructions     Psychologists/ Counselors                        Mckinley Flores          ...            ..[phone]  [facility]                    ..  [phone]  [facility]                 .[phone]  [facility] (kids)       .         [phone]  [facility](teens)                ..[phone]  [facility]                    [phone]  **[facility]                   [phone]  [facility]           ...      .. [phone]  **[facility]     ...          ...[phone]  [facility]               [phone]   **[facility]..            .[phone]  [facility]               .    [phone]  [facility]                ..[phone]  [facility]     .      . ..[phone]              [facility]              .   [phone]  [facility]              .  ...[phone]  [facility]              .   ..[phone]  [facility]           ...      .. [phone]  [facility]              . ..[phone]  [facility]              .    ..[phone]  [facility]              .     .. [phone]  **[facility]                 ... [phone]  RS                         .[phone]  [facility]     .      . ..[phone]  **[facility]...............................................................[phone]            [facility]              ..  [phone]                   [facility]             . ... ..[phone]  [facility]              .      ..[phone]  **Annamaria Lew              .     ..[phone]  **[facility]        .      . [phone]  [facility]           .[phone]  [facility]         .    ..[phone]  [facility]             . ..  ..  [phone]  [facility]            [phone]  [facility]              ..[phone]  [facility]                     [phone]    [place]  [facility]           ..     .[phone]     [facility]    ...     ...[phone]     [facility]            [phone]                                                  Charles Blank                ..  .  [phone]  **[facility]         .     .  [phone]  [facility] [address] TIMOTHY Borrero              . [phone]  [facility]  Services  ..        [phone]  **[facility]              .    [phone]    [facility]               ...  .  [phone]  [facility]            [phone]  [facility]     .      . ..[phone]         *Facilities in bold italics indicate medication management  Services are offered.     Crisis support    If you or someone you know is struggling or in crisis, help is available. Call or text 730 or chat [email or web address]    The volunteer Crisis Counselor will help you move from a 'hot moment to a cool moment'        No follow-ups on file.    Pinky Medel MD  [facility] Moreno is a 32 year old, presenting for the following health issues:  Depression  No flowsheet data found.  HPI     **Patient doesn't know if she has depression or if she is just struggling with the situation at home with her not healing like she should and not being able to  her toddler     Depression Followup    How are you doing with your depression since your last visit? About the same since she has seen Dr. Godinez last.    Are you having other symptoms that might be associated with depression? No    Have you had a significant life event?  No     Are you feeling anxious or having panic attacks?   No    Do you have any concerns with your use of alcohol or other drugs? No    Social History     Tobacco Use     Smoking status: Never     Smokeless tobacco: Never   Vaping Use     Vaping Use: Never used   Substance Use Topics     Alcohol use: Yes     Alcohol/week: 1.7 standard drinks     Types: 2 Cans of beer per week     Comment: social - weekly     Drug use: No     PHQ 11/18/2022 2/23/2023 3/30/2023   PHQ-9 Total Score 4 2 5   Q9: Thoughts of better off dead/self-harm past 2 weeks Not at all Not at all Not at all     IMTIAZ-7 SCORE 6/15/2018 2/23/2023 3/30/2023   Total Score - - 3 (minimal anxiety)   Total Score 2 2 3     Last PHQ-9 3/30/2023   1.   Little interest or pleasure in doing things 1   2.  Feeling down, depressed, or hopeless 1   3.  Trouble falling or staying asleep, or sleeping too much 0   4.  Feeling tired or having little energy 2   5.  Poor appetite or overeating 0   6.  Feeling bad about yourself 1   7.  Trouble concentrating 0   8.  Moving slowly or restless 0   Q9: Thoughts of better off dead/self-harm past 2 weeks 0   PHQ-9 Total Score 5   Difficulty at work, home, or with people -     IMTIAZ-7  3/30/2023   1. Feeling nervous, anxious, or on edge 0   2. Not being able to stop or control worrying 0   3. Worrying too much about different things 0   4. Trouble relaxing 1   5. Being so restless that it is hard to sit still 1   6. Becoming easily annoyed or irritable 1   7. Feeling afraid, as if something awful might happen 0   IMTIAZ-7 Total Score 3   If you checked any problems, how difficult have they made it for you to do your work, take care of things at home, or get along with other people? Not difficult at all       Suicide Assessment Five-step Evaluation and Treatment (SAFE-T)    How many servings of fruits and vegetables do you eat daily?  2-3    On average, how many sweetened beverages do you drink each day (Examples: soda, juice, sweet tea, etc.  Do NOT count diet or artificially sweetened beverages)?   1    How many days per week do you exercise enough to make your heart beat faster? 3 or less     How many minutes a day do you exercise enough to make your heart beat faster? 30 - 60    How many days per week do you miss taking your medication? 0    Review of Systems   Constitutional, HEENT, cardiovascular, pulmonary, GI, , musculoskeletal, neuro, skin, endocrine and psych systems are negative, except as otherwise noted.      Objective    BP 98/66 (BP Location: Right arm, Patient Position: Sitting, Cuff Size: Adult Regular)   Pulse 75   Temp 98.1  F (36.7  C) (Tympanic)   Wt 64 kg (141 lb 3.2 oz)   SpO2 96%   BMI 25.83 kg/m    Body  mass index is 25.83 kg/m .  Physical Exam   GENERAL: healthy, alert and no distress  RESP: lungs clear to auscultation - no rales, rhonchi or wheezes  CV: regular rate and rhythm, normal S1 S2, no S3 or S4, no murmur, click or rub, no peripheral edema and peripheral pulses strong  MS: no gross musculoskeletal defects noted, no edema  PSYCH: mentation appears normal, affect normal/bright  PSYCH: Appropriately tearful at times    Results for orders placed or performed in visit on 03/30/23   Ferritin     Status: Normal   Result Value [facility]    Ferritin 112 6 - 175 ng/mL   Iron and iron binding capacity     Status: Normal   Result Value [facility]    Iron 66 37 - 145 ug/dL    Iron Binding Capacity 263 240 - 430 ug/dL    Iron Sat Index 25 15 - 46 %   Hemoglobin     Status: Normal   Result Value Ref [facility]    Hemoglobin 13.7 11.7 - 15.7 g/dL   TSH with free T4 reflex     Status: Normal   Result Value Ref [facility]    TSH 1.36 0.30 - 4.20 uIU/mL       Answers for HPI/ROS submitted by the patient on 3/30/2023  If you checked off any problems, how difficult have these problems made it for you to do your work, take care of things at home, or get along with other people?: Not difficult at all  PHQ9 TOTAL SCORE: 5  IMTIAZ 7 TOTAL SCORE: 3

## 2023-03-30 ENCOUNTER — OFFICE VISIT (OUTPATIENT)
Dept: FAMILY MEDICINE | Facility: OTHER | Age: 33
End: 2023-03-30
Attending: FAMILY MEDICINE
Payer: COMMERCIAL

## 2023-03-30 VITALS
TEMPERATURE: 98.1 F | SYSTOLIC BLOOD PRESSURE: 98 MMHG | OXYGEN SATURATION: 96 % | HEART RATE: 75 BPM | DIASTOLIC BLOOD PRESSURE: 66 MMHG | WEIGHT: 141.2 LBS | BODY MASS INDEX: 25.83 KG/M2

## 2023-03-30 DIAGNOSIS — E55.9 HYPOVITAMINOSIS D: ICD-10-CM

## 2023-03-30 DIAGNOSIS — R53.83 OTHER FATIGUE: ICD-10-CM

## 2023-03-30 LAB
FERRITIN SERPL-MCNC: 112 NG/ML (ref 6–175)
HGB BLD-MCNC: 13.7 G/DL (ref 11.7–15.7)
IRON BINDING CAPACITY (ROCHE): 263 UG/DL (ref 240–430)
IRON SATN MFR SERPL: 25 % (ref 15–46)
IRON SERPL-MCNC: 66 UG/DL (ref 37–145)
TSH SERPL DL<=0.005 MIU/L-ACNC: 1.36 UIU/ML (ref 0.3–4.2)

## 2023-03-30 PROCEDURE — 82306 VITAMIN D 25 HYDROXY: CPT | Performed by: FAMILY MEDICINE

## 2023-03-30 PROCEDURE — 83550 IRON BINDING TEST: CPT | Performed by: FAMILY MEDICINE

## 2023-03-30 PROCEDURE — 82728 ASSAY OF FERRITIN: CPT | Performed by: FAMILY MEDICINE

## 2023-03-30 PROCEDURE — 83540 ASSAY OF IRON: CPT | Performed by: FAMILY MEDICINE

## 2023-03-30 PROCEDURE — 84443 ASSAY THYROID STIM HORMONE: CPT | Performed by: FAMILY MEDICINE

## 2023-03-30 PROCEDURE — 99215 OFFICE O/P EST HI 40 MIN: CPT | Performed by: FAMILY MEDICINE

## 2023-03-30 PROCEDURE — 85018 HEMOGLOBIN: CPT | Performed by: FAMILY MEDICINE

## 2023-03-30 PROCEDURE — 36415 COLL VENOUS BLD VENIPUNCTURE: CPT | Performed by: FAMILY MEDICINE

## 2023-03-30 RX ORDER — PROGESTERONE 100 MG/1
100 CAPSULE ORAL AT BEDTIME
Qty: 10 CAPSULE | Refills: 0 | Status: SHIPPED | OUTPATIENT
Start: 2023-03-30 | End: 2023-05-18

## 2023-03-30 ASSESSMENT — EDINBURGH POSTNATAL DEPRESSION SCALE (EPDS)
I HAVE FELT SCARED OR PANICKY FOR NO GOOD REASON: NO, NOT AT ALL
I HAVE BEEN SO UNHAPPY THAT I HAVE BEEN CRYING: ONLY OCCASIONALLY
THE THOUGHT OF HARMING MYSELF HAS OCCURRED TO ME: NEVER
I HAVE LOOKED FORWARD WITH ENJOYMENT TO THINGS: RATHER LESS THAN I USED TO
THINGS HAVE BEEN GETTING ON TOP OF ME: YES, SOMETIMES I HAVEN'T BEEN COPING AS WELL AS USUAL
I HAVE BEEN SO UNHAPPY THAT I HAVE HAD DIFFICULTY SLEEPING: NOT AT ALL
I HAVE FELT SAD OR MISERABLE: YES, QUITE OFTEN
I HAVE BEEN SO UNHAPPY THAT I HAVE HAD DIFFICULTY SLEEPING: NOT AT ALL
I HAVE LOOKED FORWARD WITH ENJOYMENT TO THINGS: RATHER LESS THAN I USED TO
I HAVE BEEN ANXIOUS OR WORRIED FOR NO GOOD REASON: NO, NOT AT ALL
I HAVE BEEN ANXIOUS OR WORRIED FOR NO GOOD REASON: NO, NOT AT ALL
I HAVE FELT SAD OR MISERABLE: YES, QUITE OFTEN
I HAVE BEEN ABLE TO LAUGH AND SEE THE FUNNY SIDE OF THINGS: NOT QUITE SO MUCH NOW
I HAVE BEEN SO UNHAPPY THAT I HAVE BEEN CRYING: ONLY OCCASIONALLY
I HAVE BEEN ABLE TO LAUGH AND SEE THE FUNNY SIDE OF THINGS: NOT QUITE SO MUCH NOW
THINGS HAVE BEEN GETTING ON TOP OF ME: YES, SOMETIMES I HAVEN'T BEEN COPING AS WELL AS USUAL
I HAVE BLAMED MYSELF UNNECESSARILY WHEN THINGS WENT WRONG: YES, SOME OF THE TIME
THE THOUGHT OF HARMING MYSELF HAS OCCURRED TO ME: NEVER
I HAVE FELT SCARED OR PANICKY FOR NO GOOD REASON: NO, NOT AT ALL
I HAVE BLAMED MYSELF UNNECESSARILY WHEN THINGS WENT WRONG: YES, SOME OF THE TIME
TOTAL SCORE: 9

## 2023-03-30 ASSESSMENT — PATIENT HEALTH QUESTIONNAIRE - PHQ9
SUM OF ALL RESPONSES TO PHQ QUESTIONS 1-9: 5
SUM OF ALL RESPONSES TO PHQ QUESTIONS 1-9: 5
10. IF YOU CHECKED OFF ANY PROBLEMS, HOW DIFFICULT HAVE THESE PROBLEMS MADE IT FOR YOU TO DO YOUR WORK, TAKE CARE OF THINGS AT HOME, OR GET ALONG WITH OTHER PEOPLE: NOT DIFFICULT AT ALL

## 2023-03-30 ASSESSMENT — ANXIETY QUESTIONNAIRES
1. FEELING NERVOUS, ANXIOUS, OR ON EDGE: NOT AT ALL
2. NOT BEING ABLE TO STOP OR CONTROL WORRYING: NOT AT ALL
7. FEELING AFRAID AS IF SOMETHING AWFUL MIGHT HAPPEN: NOT AT ALL
GAD7 TOTAL SCORE: 3
6. BECOMING EASILY ANNOYED OR IRRITABLE: SEVERAL DAYS
3. WORRYING TOO MUCH ABOUT DIFFERENT THINGS: NOT AT ALL
4. TROUBLE RELAXING: SEVERAL DAYS
8. IF YOU CHECKED OFF ANY PROBLEMS, HOW DIFFICULT HAVE THESE MADE IT FOR YOU TO DO YOUR WORK, TAKE CARE OF THINGS AT HOME, OR GET ALONG WITH OTHER PEOPLE?: NOT DIFFICULT AT ALL
GAD7 TOTAL SCORE: 3
GAD7 TOTAL SCORE: 3
IF YOU CHECKED OFF ANY PROBLEMS ON THIS QUESTIONNAIRE, HOW DIFFICULT HAVE THESE PROBLEMS MADE IT FOR YOU TO DO YOUR WORK, TAKE CARE OF THINGS AT HOME, OR GET ALONG WITH OTHER PEOPLE: NOT DIFFICULT AT ALL
7. FEELING AFRAID AS IF SOMETHING AWFUL MIGHT HAPPEN: NOT AT ALL
5. BEING SO RESTLESS THAT IT IS HARD TO SIT STILL: SEVERAL DAYS

## 2023-03-30 ASSESSMENT — PAIN SCALES - GENERAL: PAINLEVEL: NO PAIN (0)

## 2023-03-30 NOTE — PATIENT INSTRUCTIONS
Psychologists/ Counselors                        Mckniley Flores          ...            ..[phone]  [facility]                    ..  [phone]  [facility]                 .[phone]  [facility] (kids)       .         [phone]  [facility]teens)                ..[phone]  [facility]                    [phone]  **[facility]                   [phone]  [facility]           ...      .. [phone]  **[facility]     ...          ...[phone]  [facility]               [phone]   **[facility]..            .[phone]  [facility]               .    [phone]  [facility]                ..[phone]  [facility]     .      . ..[phone]              [facility]              .   [phone]  [facility]              .  ...[phone]  [facility]              .   ..[phone]  [facility]           ...      .. [phone]  [facility]              . ..[phone]  [facility]              .    ..[phone]  [facility]              .     .. [phone]  **[facility]                 ... [phone]  [facility]                         .[phone]  [facility]     .      . ..[phone]  **[facility]...............................................................[phone]            [facility]              ..  [phone]                   [facility]             . ... ..[phone]  C[facility] Moiris              .      ..[phone]  **[facility] Lew              .     ..[phone]  **[facility]        .      . [phone]  [facility]           .[phone]  [facility]         .    ..[phone]  [facility]             . ..  ..  [phone]  [facility]            [phone]  [facility]              ..[phone]  [facility]                     [phone]    [facility]           ..     [phone]     [facility]    ...     ...[phone]     [facility]            [phone]                                                  [place]  Navneet Blank                ..  .  [phone]  **[facility]         .     .  [phone]  [facility] Dr[facility] TIMOTHY Borrero              . [phone]  [facility]  ..        [phone]  **[facility]              .    [phone]    [facility]               ...  .  [phone]  [facility] Services            [phone]  [facility] Services     .      . ..[phone]         *Facilities in bold italics indicate medication management  Services are offered.     Crisis support    If you or someone you know is struggling or in crisis, help is available. Call or text 369 or chat [email or web address]    The volunteer Crisis Counselor will help you move from a 'hot moment to a cool moment'

## 2023-03-31 LAB — DEPRECATED CALCIDIOL+CALCIFEROL SERPL-MC: 45 UG/L (ref 20–75)

## 2023-04-20 ENCOUNTER — MEDICAL CORRESPONDENCE (OUTPATIENT)
Dept: HEALTH INFORMATION MANAGEMENT | Facility: HOSPITAL | Age: 33
End: 2023-04-20

## 2023-05-18 ENCOUNTER — OFFICE VISIT (OUTPATIENT)
Dept: FAMILY MEDICINE | Facility: OTHER | Age: 33
End: 2023-05-18
Attending: FAMILY MEDICINE
Payer: COMMERCIAL

## 2023-05-18 VITALS
TEMPERATURE: 98.4 F | SYSTOLIC BLOOD PRESSURE: 115 MMHG | OXYGEN SATURATION: 98 % | BODY MASS INDEX: 25.46 KG/M2 | HEART RATE: 61 BPM | DIASTOLIC BLOOD PRESSURE: 60 MMHG | WEIGHT: 139.2 LBS

## 2023-05-18 DIAGNOSIS — M62.08 DIASTASIS RECTI: ICD-10-CM

## 2023-05-18 DIAGNOSIS — K58.0 IRRITABLE BOWEL SYNDROME WITH DIARRHEA: ICD-10-CM

## 2023-05-18 DIAGNOSIS — G43.109 MIGRAINE WITH AURA AND WITHOUT STATUS MIGRAINOSUS, NOT INTRACTABLE: ICD-10-CM

## 2023-05-18 DIAGNOSIS — M62.89 PELVIC FLOOR DYSFUNCTION IN FEMALE: ICD-10-CM

## 2023-05-18 DIAGNOSIS — F41.0 PANIC ATTACK: ICD-10-CM

## 2023-05-18 PROCEDURE — 99214 OFFICE O/P EST MOD 30 MIN: CPT | Performed by: FAMILY MEDICINE

## 2023-05-18 RX ORDER — PROGESTERONE 100 MG/1
100 CAPSULE ORAL AT BEDTIME
Qty: 10 CAPSULE | Refills: 0 | Status: SHIPPED | OUTPATIENT
Start: 2023-05-18 | End: 2023-08-16

## 2023-05-18 ASSESSMENT — EDINBURGH POSTNATAL DEPRESSION SCALE (EPDS)
I HAVE FELT SAD OR MISERABLE: NOT VERY OFTEN
THINGS HAVE BEEN GETTING ON TOP OF ME: YES, SOMETIMES I HAVEN'T BEEN COPING AS WELL AS USUAL
TOTAL SCORE: 8
I HAVE BEEN ANXIOUS OR WORRIED FOR NO GOOD REASON: NO, NOT AT ALL
THE THOUGHT OF HARMING MYSELF HAS OCCURRED TO ME: NEVER
I HAVE BLAMED MYSELF UNNECESSARILY WHEN THINGS WENT WRONG: YES, SOME OF THE TIME
I HAVE FELT SCARED OR PANICKY FOR NO GOOD REASON: NO, NOT AT ALL
I HAVE BEEN ABLE TO LAUGH AND SEE THE FUNNY SIDE OF THINGS: NOT QUITE SO MUCH NOW
I HAVE BEEN SO UNHAPPY THAT I HAVE HAD DIFFICULTY SLEEPING: NOT AT ALL
I HAVE LOOKED FORWARD WITH ENJOYMENT TO THINGS: RATHER LESS THAN I USED TO
THINGS HAVE BEEN GETTING ON TOP OF ME: YES, SOMETIMES I HAVEN'T BEEN COPING AS WELL AS USUAL
I HAVE BEEN ABLE TO LAUGH AND SEE THE FUNNY SIDE OF THINGS: NOT QUITE SO MUCH NOW
I HAVE FELT SCARED OR PANICKY FOR NO GOOD REASON: NO, NOT AT ALL
THE THOUGHT OF HARMING MYSELF HAS OCCURRED TO ME: NEVER
I HAVE BEEN SO UNHAPPY THAT I HAVE HAD DIFFICULTY SLEEPING: NOT AT ALL
I HAVE BEEN ANXIOUS OR WORRIED FOR NO GOOD REASON: NO, NOT AT ALL
I HAVE BEEN SO UNHAPPY THAT I HAVE BEEN CRYING: ONLY OCCASIONALLY
I HAVE FELT SAD OR MISERABLE: NOT VERY OFTEN
I HAVE BEEN SO UNHAPPY THAT I HAVE BEEN CRYING: ONLY OCCASIONALLY
I HAVE BLAMED MYSELF UNNECESSARILY WHEN THINGS WENT WRONG: YES, SOME OF THE TIME
I HAVE LOOKED FORWARD WITH ENJOYMENT TO THINGS: RATHER LESS THAN I USED TO

## 2023-05-18 ASSESSMENT — ANXIETY QUESTIONNAIRES
1. FEELING NERVOUS, ANXIOUS, OR ON EDGE: NOT AT ALL
6. BECOMING EASILY ANNOYED OR IRRITABLE: SEVERAL DAYS
GAD7 TOTAL SCORE: 1
IF YOU CHECKED OFF ANY PROBLEMS ON THIS QUESTIONNAIRE, HOW DIFFICULT HAVE THESE PROBLEMS MADE IT FOR YOU TO DO YOUR WORK, TAKE CARE OF THINGS AT HOME, OR GET ALONG WITH OTHER PEOPLE: SOMEWHAT DIFFICULT
4. TROUBLE RELAXING: NOT AT ALL
5. BEING SO RESTLESS THAT IT IS HARD TO SIT STILL: NOT AT ALL
7. FEELING AFRAID AS IF SOMETHING AWFUL MIGHT HAPPEN: NOT AT ALL
8. IF YOU CHECKED OFF ANY PROBLEMS, HOW DIFFICULT HAVE THESE MADE IT FOR YOU TO DO YOUR WORK, TAKE CARE OF THINGS AT HOME, OR GET ALONG WITH OTHER PEOPLE?: SOMEWHAT DIFFICULT
3. WORRYING TOO MUCH ABOUT DIFFERENT THINGS: NOT AT ALL
GAD7 TOTAL SCORE: 1
7. FEELING AFRAID AS IF SOMETHING AWFUL MIGHT HAPPEN: NOT AT ALL
GAD7 TOTAL SCORE: 1
2. NOT BEING ABLE TO STOP OR CONTROL WORRYING: NOT AT ALL

## 2023-05-18 ASSESSMENT — PATIENT HEALTH QUESTIONNAIRE - PHQ9
10. IF YOU CHECKED OFF ANY PROBLEMS, HOW DIFFICULT HAVE THESE PROBLEMS MADE IT FOR YOU TO DO YOUR WORK, TAKE CARE OF THINGS AT HOME, OR GET ALONG WITH OTHER PEOPLE: SOMEWHAT DIFFICULT
SUM OF ALL RESPONSES TO PHQ QUESTIONS 1-9: 5
SUM OF ALL RESPONSES TO PHQ QUESTIONS 1-9: 5

## 2023-05-18 ASSESSMENT — PAIN SCALES - GENERAL: PAINLEVEL: NO PAIN (1)

## 2023-05-18 NOTE — PROGRESS NOTES
{PROVIDER CHARTING PREFERENCE:904664}    Milana Moreno is a 32 year old, presenting for the following health issues:  No chief complaint on file.        3/30/2023     8:53 AM   Additional Questions   Roomed by Erwin Carrillo   Accompanied by N/A     HPI     Depression Followup    How are you doing with your depression since your last visit? Improved ***    Are you having other symptoms that might be associated with depression? Yes:  body image    Have you had a significant life event?  OTHER: new baby     Are you feeling anxious or having panic attacks?   Yes:  overwhelmed with children     Do you have any concerns with your use of alcohol or other drugs? No    Social History     Tobacco Use     Smoking status: Never     Smokeless tobacco: Never   Vaping Use     Vaping status: Never Used   Substance Use Topics     Alcohol use: Yes     Alcohol/week: 1.7 standard drinks of alcohol     Types: 2 Cans of beer per week     Comment: social - weekly     Drug use: No         2/23/2023    10:00 AM 3/30/2023     8:46 AM 5/18/2023    12:57 PM   PHQ   PHQ-9 Total Score 2 5 5   Q9: Thoughts of better off dead/self-harm past 2 weeks Not at all Not at all Not at all         2/23/2023    10:00 AM 3/30/2023     8:48 AM 5/18/2023    12:58 PM   IMTIAZ-7 SCORE   Total Score  3 (minimal anxiety) 1 (minimal anxiety)   Total Score 2 3 1         5/18/2023    12:57 PM   Last PHQ-9   1.  Little interest or pleasure in doing things 1   2.  Feeling down, depressed, or hopeless 1   3.  Trouble falling or staying asleep, or sleeping too much 0   4.  Feeling tired or having little energy 1   5.  Poor appetite or overeating 0   6.  Feeling bad about yourself 2   7.  Trouble concentrating 0   8.  Moving slowly or restless 0   Q9: Thoughts of better off dead/self-harm past 2 weeks 0   PHQ-9 Total Score 5         5/18/2023    12:58 PM   IMTIAZ-7    1. Feeling nervous, anxious, or on edge 0   2. Not being able to stop or control worrying 0   3.  Worrying too much about different things 0   4. Trouble relaxing 0   5. Being so restless that it is hard to sit still 0   6. Becoming easily annoyed or irritable 1   7. Feeling afraid, as if something awful might happen 0   IMTIAZ-7 Total Score 1   If you checked any problems, how difficult have they made it for you to do your work, take care of things at home, or get along with other people? Somewhat difficult       Suicide Assessment Five-step Evaluation and Treatment (SAFE-T)  {Provider  Link to Depression Care Package SmartSet :805523}    How many servings of fruits and vegetables do you eat daily?  2-3    On average, how many sweetened beverages do you drink each day (Examples: soda, juice, sweet tea, etc.  Do NOT count diet or artificially sweetened beverages)?   0    How many days per week do you exercise enough to make your heart beat faster? 4    How many minutes a day do you exercise enough to make your heart beat faster? 30 - 60  How many days per week do you miss taking your medication? 1    What makes it hard for you to take your medications?  busy mom    {additonal problems for provider to add (Optional):563300}      Review of Systems   {ROS COMP (Optional):869893}      Objective    /60 (BP Location: Right arm, Patient Position: Sitting, Cuff Size: Adult Regular)   Pulse 61   Temp 98.4  F (36.9  C) (Tympanic)   Wt 63.1 kg (139 lb 3.2 oz)   SpO2 98%   Breastfeeding Yes   BMI 25.46 kg/m    Body mass index is 25.46 kg/m .  Physical Exam   {Exam List (Optional):496483}    {Diagnostic Test Results (Optional):789936}    {AMBULATORY ATTESTATION (Optional):980660}

## 2023-05-18 NOTE — PROGRESS NOTES
Assessment & Plan     Post partum depression  Can use if needed  - progesterone (PROMETRIUM) 100 MG capsule; Take 1 capsule (100 mg) by mouth At Bedtime    Panic attack  ? Could be, she is learning good tools from therapist    Mary Lou zuniga  Has been working on it herself, will get formal PT  - Physical Therapy Referral; Future    Pelvic floor dysfunction in female    - Physical Therapy Referral; Future    Irritable bowel syndrome with diarrhea  Discussed foods to try and avoid or monitor to see if correlation with certain foods or certain stressors    Migraine with aura and without status migrainosus, not intractable  Okay to take Excedrin Migraine with breast-feeding and keep track to see if associated with her cycle or lack of sleep or other factors      30 minutes spent by me on the date of the encounter doing chart review, history and exam, documentation and further activities per the note     Patient was agreeable to this plan and had no further questions.  Patient Instructions   1.  Bone broth to help heal gut  2. Glutamine -- take as directed on bottle  3. Immodium if needed on 'cross contamination' days  4.       No follow-ups on file.    Pinky Medel MD  [facility] - PRECIOUS Moreno is a 32 year old, presenting for the following health issues:  Depression    HPI     Abnormal Mood Symptoms- POST PARTUM   Onset/Duration: 3 weeks after birth of last child   Description: felt very weepy and sad  Depression (if yes, do PHQ-9): YES  Anxiety (if yes, do IMTIAZ-7): YES  Accompanying Signs & Symptoms:  Still participating in activities that you used to enjoy: YES  Fatigue: YES  Irritability: YES  Difficulty concentrating: No  Changes in appetite: No  Problems with sleep: No  Heart racing/beating fast: No  Abnormally elevated, expansive, or irritable mood: overwhelmed  Persistently increased activity or energy: No  Thoughts of hurting yourself or others: No  History:  Recent  stress or major life event: birth of child  Prior depression or anxiety: None  Family history of depression or anxiety: anxiety  Alcohol/drug use: No  Difficulty sleeping: No  Precipitating or alleviating factors: when children follow rules and abide  Therapies tried and outcome: none and individual therapy      2/23/2023    10:00 AM 3/30/2023     8:46 AM 5/18/2023    12:57 PM   PHQ   PHQ-9 Total Score 2 5 5   Q9: Thoughts of better off dead/self-harm past 2 weeks Not at all Not at all Not at all         2/23/2023    10:00 AM 3/30/2023     8:48 AM 5/18/2023    12:58 PM   IMTIAZ-7 SCORE   Total Score  3 (minimal anxiety) 1 (minimal anxiety)   Total Score 2 3 1     Review of Systems   Constitutional, HEENT, cardiovascular, pulmonary, gi and gu systems are negative, except as otherwise noted.      Objective    /60 (BP Location: Right arm, Patient Position: Sitting, Cuff Size: Adult Regular)   Pulse 61   Temp 98.4  F (36.9  C) (Tympanic)   Wt 63.1 kg (139 lb 3.2 oz)   SpO2 98%   Breastfeeding Yes   BMI 25.46 kg/m    Body mass index is 25.46 kg/m .  Physical Exam   GENERAL: healthy, alert and no distress  NECK: no adenopathy, no asymmetry, masses, or scars and thyroid normal to palpation  RESP: lungs clear to auscultation - no rales, rhonchi or wheezes  CV: regular rate and rhythm, normal S1 S2, no S3 or S4, no murmur, click or rub, no peripheral edema and peripheral pulses strong  ABDOMEN: soft, nontender, no hepatosplenomegaly, no masses and bowel sounds normal  MS: no gross musculoskeletal defects noted, no edema  SKIN:  Incision looks good  PSYCH: mentation appears normal, affect normal/bright    No results found for any visits on 05/18/23.              Answers for HPI/ROS submitted by the patient on 5/18/2023  If you checked off any problems, how difficult have these problems made it for you to do your work, take care of things at home, or get along with other people?: Somewhat difficult  PHQ9 TOTAL SCORE:  5  IMTIAZ 7 TOTAL SCORE: 1

## 2023-05-18 NOTE — PATIENT INSTRUCTIONS
Bone broth to help heal gut  Glutamine -- take as directed on bottle  Immodium if needed on 'cross contamination' days

## 2023-05-23 ENCOUNTER — THERAPY VISIT (OUTPATIENT)
Dept: PHYSICAL THERAPY | Facility: HOSPITAL | Age: 33
End: 2023-05-23
Attending: FAMILY MEDICINE
Payer: COMMERCIAL

## 2023-05-23 DIAGNOSIS — M62.08 DIASTASIS RECTI: ICD-10-CM

## 2023-05-23 DIAGNOSIS — M62.89 PELVIC FLOOR DYSFUNCTION IN FEMALE: ICD-10-CM

## 2023-05-23 PROCEDURE — 97161 PT EVAL LOW COMPLEX 20 MIN: CPT | Mod: GP

## 2023-05-23 PROCEDURE — 97110 THERAPEUTIC EXERCISES: CPT | Mod: GP
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
## 2023-06-01 ENCOUNTER — THERAPY VISIT (OUTPATIENT)
Dept: PHYSICAL THERAPY | Facility: HOSPITAL | Age: 33
End: 2023-06-01
Attending: FAMILY MEDICINE
Payer: COMMERCIAL

## 2023-06-01 DIAGNOSIS — M62.08 DIASTASIS RECTI: Primary | ICD-10-CM

## 2023-06-01 DIAGNOSIS — M62.89 PELVIC FLOOR DYSFUNCTION IN FEMALE: ICD-10-CM

## 2023-06-01 PROCEDURE — 97140 MANUAL THERAPY 1/> REGIONS: CPT | Mod: GP

## 2023-06-01 PROCEDURE — 97110 THERAPEUTIC EXERCISES: CPT | Mod: GP

## 2023-06-01 PROCEDURE — 97530 THERAPEUTIC ACTIVITIES: CPT | Mod: GP

## 2023-06-06 ENCOUNTER — THERAPY VISIT (OUTPATIENT)
Dept: PHYSICAL THERAPY | Facility: HOSPITAL | Age: 33
End: 2023-06-06
Attending: FAMILY MEDICINE
Payer: COMMERCIAL

## 2023-06-06 DIAGNOSIS — M62.08 DIASTASIS RECTI: Primary | ICD-10-CM

## 2023-06-06 DIAGNOSIS — M62.89 PELVIC FLOOR DYSFUNCTION IN FEMALE: ICD-10-CM

## 2023-06-06 PROCEDURE — 97140 MANUAL THERAPY 1/> REGIONS: CPT | Mod: GP

## 2023-06-06 PROCEDURE — 97110 THERAPEUTIC EXERCISES: CPT | Mod: GP

## 2023-06-06 PROCEDURE — 97530 THERAPEUTIC ACTIVITIES: CPT | Mod: GP

## 2023-06-09 ENCOUNTER — TRANSFERRED RECORDS (OUTPATIENT)
Dept: HEALTH INFORMATION MANAGEMENT | Facility: CLINIC | Age: 33
End: 2023-06-09

## 2023-06-15 ENCOUNTER — MEDICAL CORRESPONDENCE (OUTPATIENT)
Dept: HEALTH INFORMATION MANAGEMENT | Facility: HOSPITAL | Age: 33
End: 2023-06-15

## 2023-06-22 ENCOUNTER — THERAPY VISIT (OUTPATIENT)
Dept: PHYSICAL THERAPY | Facility: HOSPITAL | Age: 33
End: 2023-06-22
Attending: FAMILY MEDICINE
Payer: COMMERCIAL

## 2023-06-22 DIAGNOSIS — M62.08 DIASTASIS RECTI: ICD-10-CM

## 2023-06-22 DIAGNOSIS — M62.89 PELVIC FLOOR DYSFUNCTION IN FEMALE: Primary | ICD-10-CM

## 2023-06-22 PROCEDURE — 97140 MANUAL THERAPY 1/> REGIONS: CPT | Mod: GP

## 2023-06-22 PROCEDURE — 97110 THERAPEUTIC EXERCISES: CPT | Mod: GP

## 2023-07-01 ENCOUNTER — HEALTH MAINTENANCE LETTER (OUTPATIENT)
Age: 33
End: 2023-07-01

## 2023-07-06 ENCOUNTER — THERAPY VISIT (OUTPATIENT)
Dept: PHYSICAL THERAPY | Facility: HOSPITAL | Age: 33
End: 2023-07-06
Attending: FAMILY MEDICINE
Payer: COMMERCIAL

## 2023-07-06 DIAGNOSIS — M62.08 DIASTASIS RECTI: ICD-10-CM

## 2023-07-06 DIAGNOSIS — M62.89 PELVIC FLOOR DYSFUNCTION IN FEMALE: Primary | ICD-10-CM

## 2023-07-06 PROCEDURE — 97530 THERAPEUTIC ACTIVITIES: CPT | Mod: GP

## 2023-07-06 PROCEDURE — 97110 THERAPEUTIC EXERCISES: CPT | Mod: GP

## 2023-07-06 PROCEDURE — 97140 MANUAL THERAPY 1/> REGIONS: CPT | Mod: GP

## 2023-08-08 ENCOUNTER — THERAPY VISIT (OUTPATIENT)
Dept: PHYSICAL THERAPY | Facility: HOSPITAL | Age: 33
End: 2023-08-08
Attending: FAMILY MEDICINE
Payer: COMMERCIAL

## 2023-08-08 DIAGNOSIS — M62.89 PELVIC FLOOR DYSFUNCTION IN FEMALE: ICD-10-CM

## 2023-08-08 DIAGNOSIS — M62.08 DIASTASIS RECTI: Primary | ICD-10-CM

## 2023-08-08 PROCEDURE — 97140 MANUAL THERAPY 1/> REGIONS: CPT | Mod: GP

## 2023-08-08 PROCEDURE — 97530 THERAPEUTIC ACTIVITIES: CPT | Mod: GP

## 2023-08-08 PROCEDURE — 97110 THERAPEUTIC EXERCISES: CPT | Mod: GP

## 2023-08-16 ENCOUNTER — MEDICAL CORRESPONDENCE (OUTPATIENT)
Dept: HEALTH INFORMATION MANAGEMENT | Facility: HOSPITAL | Age: 33
End: 2023-08-16

## 2023-08-16 ENCOUNTER — OFFICE VISIT (OUTPATIENT)
Dept: FAMILY MEDICINE | Facility: OTHER | Age: 33
End: 2023-08-16
Attending: FAMILY MEDICINE
Payer: COMMERCIAL

## 2023-08-16 VITALS
DIASTOLIC BLOOD PRESSURE: 60 MMHG | HEIGHT: 62 IN | HEART RATE: 76 BPM | SYSTOLIC BLOOD PRESSURE: 112 MMHG | OXYGEN SATURATION: 97 % | WEIGHT: 126.4 LBS | BODY MASS INDEX: 23.26 KG/M2 | TEMPERATURE: 97.8 F

## 2023-08-16 DIAGNOSIS — K42.9 UMBILICAL HERNIA WITHOUT OBSTRUCTION AND WITHOUT GANGRENE: Primary | ICD-10-CM

## 2023-08-16 PROCEDURE — 99213 OFFICE O/P EST LOW 20 MIN: CPT | Performed by: FAMILY MEDICINE

## 2023-08-16 ASSESSMENT — PAIN SCALES - GENERAL: PAINLEVEL: NO PAIN (0)

## 2023-08-16 NOTE — PROGRESS NOTES
"  Assessment & Plan     Umbilical hernia without obstruction and without gangrene  Approx 2cm umbilical hernia, she reports she is done with childbearing and would like to pursue repair  - Adult General Surg Referral    Provider  Link to [facility] Help Grid :[id number]}    Patient was agreeable to this plan and had no further questions.  There are no Patient Instructions on file for this visit.    No follow-ups on file.    Pinky Medel MD  [facility] - PRECIOUS Moreno is a 33 year old, presenting for the following health issues:  Hernia        8/16/2023     1:21 PM   Additional Questions   Roomed by Juan Diego Calvert   Accompanied by Daughter         8/16/2023     1:21 PM   Patient Reported Additional Medications   Patient reports taking the following new medications None       HPI     Concern - Hernia   Onset: A couple weeks   Description:   Right next to belly button   Intensity: None  Progression of Symptoms:  same  Accompanying Signs & Symptoms:  None  Previous history of similar problem:   None  Precipitating factors:   Worsened by: None  Alleviating factors:  Improved by: None    Therapies Tried and outcome: None      Review of Systems   Constitutional, HEENT, cardiovascular, pulmonary, gi and gu systems are negative, except as otherwise noted.      Objective    /60 (BP Location: Right arm, Patient Position: Sitting, Cuff Size: Adult Regular)   Pulse 76   Temp 97.8  F (36.6  C) (Axillary)   Ht 1.575 m (5' 2\")   Wt 57.3 kg (126 lb 6.4 oz)   SpO2 97%   Breastfeeding Yes   BMI 23.12 kg/m    Body mass index is 23.12 kg/m .  Physical Exam   GENERAL: healthy, alert and no distress  NECK: no adenopathy, no asymmetry, masses, or scars and thyroid normal to palpation  RESP: lungs clear to auscultation - no rales, rhonchi or wheezes  CV: regular rate and rhythm, normal S1 S2, no S3 or S4, no murmur, click or rub, no peripheral edema and peripheral pulses strong  ABDOMEN: soft, " nontender, no hepatosplenomegaly, no masses and bowel sounds normal  MS: no gross musculoskeletal defects noted, no edema  PSYCH: mentation appears normal, affect normal/bright    No results found for any visits on 08/16/23.

## 2023-08-24 ENCOUNTER — PREP FOR PROCEDURE (OUTPATIENT)
Dept: SURGERY | Facility: OTHER | Age: 33
End: 2023-08-24

## 2023-08-24 ENCOUNTER — OFFICE VISIT (OUTPATIENT)
Dept: SURGERY | Facility: OTHER | Age: 33
End: 2023-08-24
Attending: FAMILY MEDICINE
Payer: COMMERCIAL

## 2023-08-24 VITALS
TEMPERATURE: 98.3 F | BODY MASS INDEX: 23 KG/M2 | HEART RATE: 86 BPM | SYSTOLIC BLOOD PRESSURE: 92 MMHG | WEIGHT: 125 LBS | OXYGEN SATURATION: 98 % | DIASTOLIC BLOOD PRESSURE: 64 MMHG | HEIGHT: 62 IN

## 2023-08-24 DIAGNOSIS — K42.9 UMBILICAL HERNIA WITHOUT OBSTRUCTION AND WITHOUT GANGRENE: Primary | ICD-10-CM

## 2023-08-24 PROCEDURE — 99204 OFFICE O/P NEW MOD 45 MIN: CPT | Performed by: SURGERY

## 2023-08-24 ASSESSMENT — PAIN SCALES - GENERAL: PAINLEVEL: NO PAIN (0)

## 2023-08-25 ENCOUNTER — PREP FOR PROCEDURE (OUTPATIENT)
Dept: SURGERY | Facility: OTHER | Age: 33
End: 2023-08-25

## 2023-08-25 DIAGNOSIS — K42.0 UMBILICAL HERNIA WITH OBSTRUCTION, WITHOUT GANGRENE: Primary | ICD-10-CM

## 2023-08-29 NOTE — PROGRESS NOTES
[facility] Surgery Consultation    CC:  umbilical bulge     HPI:  This 33 year old year old female is seen at the request of Dr. Medel for evaluation of umbilical bulge. Noted it while pregnant with her most recent child. She gave birth back in 2023. She has been working with PT to try and correct her rectus diastasis. She will note some pinching with certain movements but otherwise not symptomatic. She denies changes in stool habits. She has had diagnostic laparoscopy and c-sections.     Past Medical History:   Diagnosis Date    Gluten intolerance     Lactose Intolerance 2013    Ovarian cyst 2013    Spondylolisthesis at L5-S1 level     grade 1    Urinary incontinence     urge       Past Surgical History:   Procedure Laterality Date    ADENOIDECTOMY       SECTION N/A 2019    Procedure:  SECTION;  Surgeon: Ankush Godinez MD;  Location: [facility] OR     SECTION N/A 2023    Procedure:  SECTION with delivery of viable baby girl @ 0850;  Surgeon: Ankush Godinez MD;  Location: [facility] OR    COLONOSCOPY  2014    ESOPHAGOGASTRODUODENOSCOPY  2014    GI SURGERY  3/7/13    colonoscopy    LAPAROSCOPY DIAGNOSTIC (GENERAL)      TONSILLECTOMY      [place] Teeth Extraction         Allergies   Allergen Reactions    Cats      Facial swelling    Wheat Extract      Stomach pains, constipation and diarrhea       Current Outpatient Medications   Medication    acetaminophen (TYLENOL) 500 MG tablet    famotidine (PEPCID) 20 MG tablet    ibuprofen (ADVIL/MOTRIN) 800 MG tablet    norethindrone (MICRONOR) 0.35 MG tablet    vitamin D3 (CHOLECALCIFEROL) 1.25 MG (30634 UT) capsule     No current facility-administered medications for this visit.       HABITS:    Social History     Tobacco Use    Smoking status: Never    Smokeless tobacco: Never   Vaping Use    Vaping Use: Never used   Substance Use Topics    Alcohol use: Yes     Alcohol/week: 1.7 standard drinks of alcohol     Types:  "2 Cans of beer per week     Comment: social - weekly    Drug use: No       Family History   Problem Relation Age of Onset    Heart Disease Mother 50        SCAD/AMI 2nd to SCAD    Breast Cancer Mother 54    Family History Negative Father         interstitial lung disease.       REVIEW OF SYSTEMS:  Ten point review of systems negative except those mentioned in the HPI.     OBJECTIVE:    BP 92/64   Pulse 86   Temp 98.3  F (36.8  C) (Tympanic)   Ht 1.575 m (5' 2\")   Wt 56.7 kg (125 lb)   SpO2 98%   BMI 22.86 kg/m      GENERAL: Generally appears well, in no distress with appropriate affect.  HEENT:   Sclerae anicteric - normocephalic atraumatic   Respiratory:  No acute distress, no splinting   Cardiovascular:  Regular Rate and Rhythm  Abdomen: 0.5 cm fascial defect at umbilicus, reducible . There a 1-2 cm diastasis recti  :  deferred  Extremities:  Extremities normal. No deformities, edema, or skin discoloration.  Skin:  no suspicious lesions or rashes  Neurological: grossly intact  Psych:  Alert, oriented, affect appropriate with normal decision making ability.    IMPRESSION:    Umbilical hernia, discussed the reasons to fix would be to prevent bowel obstruction. Discussed risks, discussed that her rectus diastasis is not a true hernia will try to pull together for her at the time of her hernia repair. Will plan to do under MAC with block.     PLAN:    See above.     Bharath Murray MD,     8/29/2023  1:10 PM      "

## 2023-09-12 ENCOUNTER — THERAPY VISIT (OUTPATIENT)
Dept: PHYSICAL THERAPY | Facility: HOSPITAL | Age: 33
End: 2023-09-12
Attending: FAMILY MEDICINE
Payer: COMMERCIAL

## 2023-09-12 DIAGNOSIS — M62.89 PELVIC FLOOR DYSFUNCTION IN FEMALE: ICD-10-CM

## 2023-09-12 DIAGNOSIS — M62.08 DIASTASIS RECTI: Primary | ICD-10-CM

## 2023-09-12 PROCEDURE — 97530 THERAPEUTIC ACTIVITIES: CPT | Mod: GP

## 2023-09-12 PROCEDURE — 97110 THERAPEUTIC EXERCISES: CPT | Mod: GP

## 2023-09-12 PROCEDURE — 97140 MANUAL THERAPY 1/> REGIONS: CPT | Mod: GP

## 2023-09-26 NOTE — OR NURSING
Instructed to hold NSAIDs, ASA, vitamins & supplements starting today 9/26, continue other prescribed medications as usual up to night before, No medications to take day of surgery.

## 2023-09-29 ENCOUNTER — ANESTHESIA EVENT (OUTPATIENT)
Dept: SURGERY | Facility: HOSPITAL | Age: 33
End: 2023-09-29
Payer: COMMERCIAL

## 2023-09-29 NOTE — ANESTHESIA PREPROCEDURE EVALUATION
Anesthesia Pre-Procedure Evaluation    Patient: Tiffanie Alejo   MRN: [id number] : 1990        Procedure : Procedure(s):  HERNIORRHAPHY, UMBILICAL, OPEN          Past Medical History:   Diagnosis Date     Gluten intolerance      Lactose Intolerance 2013     Ovarian cyst 2013     Spondylolisthesis at L5-S1 level     grade 1     Urinary incontinence     urge      Past Surgical History:   Procedure Laterality Date     ADENOIDECTOMY        SECTION N/A 2019    Procedure:  SECTION;  Surgeon: Ankush Godinez MD;  Location: [facility]      SECTION N/A 2023    Procedure:  SECTION with delivery of viable baby girl @ 0850;  Surgeon: Ankush Godinez MD;  Location: [facility]     COLONOSCOPY  2014     ESOPHAGOGASTRODUODENOSCOPY  2014     GI SURGERY  3/7/13    colonoscopy     LAPAROSCOPY DIAGNOSTIC (GENERAL)       TONSILLECTOMY       [place] Teeth Extraction        Allergies   Allergen Reactions     Cats      Facial swelling     Wheat Extract      Stomach pains, constipation and diarrhea      Social History     Tobacco Use     Smoking status: Never     Smokeless tobacco: Never   Substance Use Topics     Alcohol use: Yes     Alcohol/week: 1.7 standard drinks of alcohol     Types: 2 Cans of beer per week     Comment: social - weekly      Wt Readings from Last 1 Encounters:   23 56.7 kg (125 lb)        Anesthesia Evaluation   Pt has had prior anesthetic. Type: General, Regional and MAC.    History of anesthetic complications  - PONV.  very ill with opioids.    ROS/MED HX  ENT/Pulmonary:  - neg pulmonary ROS     Neurologic:     (+)      migraines,                          Cardiovascular:  - neg cardiovascular ROS     METS/Exercise Tolerance:     Hematologic:  - neg hematologic  ROS     Musculoskeletal: Comment: Spondylolisthesis at L5-S1 level.  - neg musculoskeletal ROS     GI/Hepatic:  - neg GI/hepatic ROS     Renal/Genitourinary:  - neg Renal ROS     Endo:   - neg endo ROS     Psychiatric/Substance Use:     (+) psychiatric history other (comment) (Hx panic attack)       Infectious Disease:  - neg infectious disease ROS     Malignancy:  - neg malignancy ROS     Other:  - neg other ROS          Physical Exam    Airway  airway exam normal      Mallampati: I   TM distance: > 3 FB   Neck ROM: full   Mouth opening: > 3 cm    Respiratory Devices and Support         Dental       (+) Completely normal teeth      Cardiovascular   cardiovascular exam normal       Rhythm and rate: regular and normal     Pulmonary   pulmonary exam normal        breath sounds clear to auscultation       OUTSIDE LABS:  CBC:   Lab Results   Component Value Date    WBC 8.1 02/09/2023    WBC 9.6 01/19/2023    HGB 13.7 03/30/2023    HGB 11.8 02/10/2023    HCT 36.4 02/09/2023    HCT 37.3 01/19/2023     02/09/2023     01/19/2023     BMP:   Lab Results   Component Value Date     (L) 01/19/2023     (L) 10/12/2022    POTASSIUM 4.1 01/19/2023    POTASSIUM 3.8 10/12/2022    CHLORIDE 103 01/19/2023    CHLORIDE 103 10/12/2022    CO2 21 (L) 01/19/2023    CO2 22 10/12/2022    BUN 4.5 (L) 01/19/2023    BUN 5.3 (L) 10/12/2022    CR 0.49 (L) 01/19/2023    CR 0.44 (L) 10/12/2022    GLC 71 01/19/2023    GLC 86 10/12/2022     COAGS: No results found for: PTT, INR, FIBR  POC:   Lab Results   Component Value Date    HCG Negative 05/27/2014     HEPATIC:   Lab Results   Component Value Date    ALBUMIN 3.8 01/19/2023    PROTTOTAL 6.2 (L) 01/19/2023    ALT 9 (L) 01/19/2023    AST 16 01/19/2023    ALKPHOS 99 01/19/2023    BILITOTAL 0.4 01/19/2023     OTHER:   Lab Results   Component Value Date    SHANNAN 8.7 01/19/2023    MAG 1.9 10/12/2022    LIPASE 29 01/19/2023    TSH 1.36 03/30/2023       Anesthesia Plan    ASA Status:  1    NPO Status:  NPO Appropriate    Anesthesia Type: MAC.     - Reason for MAC: straight local not clinically adequate              Consents    Anesthesia Plan(s) and associated risks,  benefits, and realistic alternatives discussed. Questions answered and patient/representative(s) expressed understanding.     - Discussed: Risks, Benefits and Alternatives for BOTH SEDATION and the PROCEDURE were discussed     - Discussed with:  Patient      - Extended Intubation/Ventilatory Support Discussed: No.      - Patient is DNR/DNI Status: No     Use of blood products discussed: No .     Postoperative Care    Pain management: Peripheral nerve block (Single Shot).        Comments:    Other Comments:               LEILA Sarabia CNP

## 2023-10-03 ENCOUNTER — HOSPITAL ENCOUNTER (OUTPATIENT)
Facility: HOSPITAL | Age: 33
Discharge: HOME | End: 2023-10-03
Attending: SURGERY | Admitting: SURGERY
Payer: COMMERCIAL

## 2023-10-03 ENCOUNTER — ANESTHESIA (OUTPATIENT)
Dept: SURGERY | Facility: HOSPITAL | Age: 33
End: 2023-10-03
Payer: COMMERCIAL

## 2023-10-03 ENCOUNTER — LAB (OUTPATIENT)
Dept: LAB | Facility: HOSPITAL | Age: 33
End: 2023-10-03
Attending: SURGERY
Payer: COMMERCIAL

## 2023-10-03 ENCOUNTER — APPOINTMENT (OUTPATIENT)
Dept: ULTRASOUND IMAGING | Facility: HOSPITAL | Age: 33
End: 2023-10-03
Attending: NURSE ANESTHETIST, CERTIFIED REGISTERED
Payer: COMMERCIAL

## 2023-10-03 VITALS
HEART RATE: 91 BPM | TEMPERATURE: 98 F | DIASTOLIC BLOOD PRESSURE: 73 MMHG | RESPIRATION RATE: 16 BRPM | OXYGEN SATURATION: 93 % | HEIGHT: 62 IN | BODY MASS INDEX: 22.26 KG/M2 | SYSTOLIC BLOOD PRESSURE: 100 MMHG | WEIGHT: 121 LBS

## 2023-10-03 LAB — HCG UR QL: NEGATIVE

## 2023-10-03 PROCEDURE — 64488 TAP BLOCK BI INJECTION: CPT | Mod: XU | Performed by: NURSE ANESTHETIST, CERTIFIED REGISTERED

## 2023-10-03 PROCEDURE — 370N000017 HC ANESTHESIA TECHNICAL FEE, PER MIN: Performed by: SURGERY

## 2023-10-03 PROCEDURE — 49591 RPR AA HRN 1ST < 3 CM RDC: CPT | Performed by: NURSE ANESTHETIST, CERTIFIED REGISTERED

## 2023-10-03 PROCEDURE — 360N000076 HC SURGERY LEVEL 3, PER MIN: Performed by: SURGERY

## 2023-10-03 PROCEDURE — 250N000013 HC RX MED GY IP 250 OP 250 PS 637: Performed by: SURGERY

## 2023-10-03 PROCEDURE — 81025 URINE PREGNANCY TEST: CPT | Performed by: NURSE ANESTHETIST, CERTIFIED REGISTERED

## 2023-10-03 PROCEDURE — 250N000011 HC RX IP 250 OP 636: Mod: JZ | Performed by: NURSE ANESTHETIST, CERTIFIED REGISTERED

## 2023-10-03 PROCEDURE — 258N000003 HC RX IP 258 OP 636: Performed by: NURSE ANESTHETIST, CERTIFIED REGISTERED

## 2023-10-03 PROCEDURE — 250N000009 HC RX 250: Performed by: NURSE ANESTHETIST, CERTIFIED REGISTERED

## 2023-10-03 PROCEDURE — 999N000141 HC STATISTIC PRE-PROCEDURE NURSING ASSESSMENT: Performed by: SURGERY

## 2023-10-03 PROCEDURE — 49591 RPR AA HRN 1ST < 3 CM RDC: CPT | Performed by: SURGERY

## 2023-10-03 PROCEDURE — 272N000001 HC OR GENERAL SUPPLY STERILE: Performed by: SURGERY

## 2023-10-03 PROCEDURE — C9290 INJ, BUPIVACAINE LIPOSOME: HCPCS | Performed by: NURSE ANESTHETIST, CERTIFIED REGISTERED

## 2023-10-03 PROCEDURE — 710N000012 HC RECOVERY PHASE 2, PER MINUTE: Performed by: SURGERY

## 2023-10-03 PROCEDURE — 250N000011 HC RX IP 250 OP 636: Performed by: SURGERY

## 2023-10-03 RX ORDER — FENTANYL CITRATE 50 UG/ML
25 INJECTION, SOLUTION INTRAMUSCULAR; INTRAVENOUS
Status: DISCONTINUED | OUTPATIENT
Start: 2023-10-03 | End: 2023-10-03 | Stop reason: HOSPADM

## 2023-10-03 RX ORDER — FENTANYL CITRATE 50 UG/ML
50 INJECTION, SOLUTION INTRAMUSCULAR; INTRAVENOUS EVERY 5 MIN PRN
Status: DISCONTINUED | OUTPATIENT
Start: 2023-10-03 | End: 2023-10-03 | Stop reason: HOSPADM

## 2023-10-03 RX ORDER — ALBUTEROL SULFATE 0.83 MG/ML
2.5 SOLUTION RESPIRATORY (INHALATION) EVERY 4 HOURS PRN
Status: DISCONTINUED | OUTPATIENT
Start: 2023-10-03 | End: 2023-10-03 | Stop reason: HOSPADM

## 2023-10-03 RX ORDER — CEFAZOLIN SODIUM/WATER 2 G/20 ML
2 SYRINGE (ML) INTRAVENOUS
Status: COMPLETED | OUTPATIENT
Start: 2023-10-03 | End: 2023-10-03

## 2023-10-03 RX ORDER — NALOXONE HYDROCHLORIDE 0.4 MG/ML
0.2 INJECTION, SOLUTION INTRAMUSCULAR; INTRAVENOUS; SUBCUTANEOUS
Status: DISCONTINUED | OUTPATIENT
Start: 2023-10-03 | End: 2023-10-03 | Stop reason: HOSPADM

## 2023-10-03 RX ORDER — NALOXONE HYDROCHLORIDE 0.4 MG/ML
0.4 INJECTION, SOLUTION INTRAMUSCULAR; INTRAVENOUS; SUBCUTANEOUS
Status: DISCONTINUED | OUTPATIENT
Start: 2023-10-03 | End: 2023-10-03 | Stop reason: HOSPADM

## 2023-10-03 RX ORDER — ONDANSETRON 4 MG/1
4 TABLET, ORALLY DISINTEGRATING ORAL EVERY 30 MIN PRN
Status: DISCONTINUED | OUTPATIENT
Start: 2023-10-03 | End: 2023-10-03 | Stop reason: HOSPADM

## 2023-10-03 RX ORDER — DEXAMETHASONE SODIUM PHOSPHATE 10 MG/ML
INJECTION, SOLUTION INTRAMUSCULAR; INTRAVENOUS
Status: COMPLETED | OUTPATIENT
Start: 2023-10-03 | End: 2023-10-03

## 2023-10-03 RX ORDER — ONDANSETRON 2 MG/ML
INJECTION INTRAMUSCULAR; INTRAVENOUS PRN
Status: DISCONTINUED | OUTPATIENT
Start: 2023-10-03 | End: 2023-10-03

## 2023-10-03 RX ORDER — LIDOCAINE 40 MG/G
CREAM TOPICAL
Status: DISCONTINUED | OUTPATIENT
Start: 2023-10-03 | End: 2023-10-03 | Stop reason: HOSPADM

## 2023-10-03 RX ORDER — BUPIVACAINE HYDROCHLORIDE 2.5 MG/ML
INJECTION, SOLUTION EPIDURAL; INFILTRATION; INTRACAUDAL
Status: DISCONTINUED
Start: 2023-10-03 | End: 2023-10-03 | Stop reason: HOSPADM

## 2023-10-03 RX ORDER — SODIUM CHLORIDE, SODIUM LACTATE, POTASSIUM CHLORIDE, CALCIUM CHLORIDE 600; 310; 30; 20 MG/100ML; MG/100ML; MG/100ML; MG/100ML
INJECTION, SOLUTION INTRAVENOUS CONTINUOUS
Status: DISCONTINUED | OUTPATIENT
Start: 2023-10-03 | End: 2023-10-03 | Stop reason: HOSPADM

## 2023-10-03 RX ORDER — FENTANYL CITRATE 50 UG/ML
25 INJECTION, SOLUTION INTRAMUSCULAR; INTRAVENOUS EVERY 5 MIN PRN
Status: DISCONTINUED | OUTPATIENT
Start: 2023-10-03 | End: 2023-10-03 | Stop reason: HOSPADM

## 2023-10-03 RX ORDER — HYDROMORPHONE HCL IN WATER/PF 6 MG/30 ML
0.4 PATIENT CONTROLLED ANALGESIA SYRINGE INTRAVENOUS EVERY 5 MIN PRN
Status: DISCONTINUED | OUTPATIENT
Start: 2023-10-03 | End: 2023-10-03 | Stop reason: HOSPADM

## 2023-10-03 RX ORDER — BUPIVACAINE HYDROCHLORIDE 2.5 MG/ML
INJECTION, SOLUTION INFILTRATION; PERINEURAL PRN
Status: DISCONTINUED | OUTPATIENT
Start: 2023-10-03 | End: 2023-10-03 | Stop reason: HOSPADM

## 2023-10-03 RX ORDER — HYDROMORPHONE HCL IN WATER/PF 6 MG/30 ML
0.2 PATIENT CONTROLLED ANALGESIA SYRINGE INTRAVENOUS EVERY 5 MIN PRN
Status: DISCONTINUED | OUTPATIENT
Start: 2023-10-03 | End: 2023-10-03 | Stop reason: HOSPADM

## 2023-10-03 RX ORDER — BUPIVACAINE HYDROCHLORIDE 2.5 MG/ML
INJECTION, SOLUTION EPIDURAL; INFILTRATION; INTRACAUDAL
Status: COMPLETED | OUTPATIENT
Start: 2023-10-03 | End: 2023-10-03

## 2023-10-03 RX ORDER — PROPOFOL 10 MG/ML
INJECTION, EMULSION INTRAVENOUS PRN
Status: DISCONTINUED | OUTPATIENT
Start: 2023-10-03 | End: 2023-10-03

## 2023-10-03 RX ORDER — ONDANSETRON 2 MG/ML
4 INJECTION INTRAMUSCULAR; INTRAVENOUS EVERY 30 MIN PRN
Status: DISCONTINUED | OUTPATIENT
Start: 2023-10-03 | End: 2023-10-03 | Stop reason: HOSPADM

## 2023-10-03 RX ORDER — LIDOCAINE HYDROCHLORIDE 20 MG/ML
INJECTION, SOLUTION INFILTRATION; PERINEURAL PRN
Status: DISCONTINUED | OUTPATIENT
Start: 2023-10-03 | End: 2023-10-03

## 2023-10-03 RX ORDER — LIDOCAINE HYDROCHLORIDE 10 MG/ML
INJECTION, SOLUTION EPIDURAL; INFILTRATION; INTRACAUDAL; PERINEURAL
Status: DISCONTINUED
Start: 2023-10-03 | End: 2023-10-03 | Stop reason: WASHOUT

## 2023-10-03 RX ORDER — LABETALOL 20 MG/4 ML (5 MG/ML) INTRAVENOUS SYRINGE
10
Status: DISCONTINUED | OUTPATIENT
Start: 2023-10-03 | End: 2023-10-03 | Stop reason: HOSPADM

## 2023-10-03 RX ORDER — PROPOFOL 10 MG/ML
INJECTION, EMULSION INTRAVENOUS CONTINUOUS PRN
Status: DISCONTINUED | OUTPATIENT
Start: 2023-10-03 | End: 2023-10-03

## 2023-10-03 RX ORDER — CEFAZOLIN SODIUM/WATER 2 G/20 ML
2 SYRINGE (ML) INTRAVENOUS SEE ADMIN INSTRUCTIONS
Status: DISCONTINUED | OUTPATIENT
Start: 2023-10-03 | End: 2023-10-03 | Stop reason: HOSPADM

## 2023-10-03 RX ORDER — HYDRALAZINE HYDROCHLORIDE 20 MG/ML
2.5-5 INJECTION INTRAMUSCULAR; INTRAVENOUS EVERY 10 MIN PRN
Status: DISCONTINUED | OUTPATIENT
Start: 2023-10-03 | End: 2023-10-03 | Stop reason: HOSPADM

## 2023-10-03 RX ORDER — IBUPROFEN 200 MG
800 TABLET ORAL ONCE
Status: COMPLETED | OUTPATIENT
Start: 2023-10-03 | End: 2023-10-03

## 2023-10-03 RX ADMIN — BUPIVACAINE HYDROCHLORIDE 30 ML: 2.5 INJECTION, SOLUTION EPIDURAL; INFILTRATION; INTRACAUDAL at 09:15

## 2023-10-03 RX ADMIN — PROPOFOL 80 MG: 10 INJECTION, EMULSION INTRAVENOUS at 09:52

## 2023-10-03 RX ADMIN — IBUPROFEN 800 MG: 200 TABLET, FILM COATED ORAL at 11:14

## 2023-10-03 RX ADMIN — LIDOCAINE HYDROCHLORIDE 20 MG: 20 INJECTION, SOLUTION INFILTRATION; PERINEURAL at 09:52

## 2023-10-03 RX ADMIN — DEXAMETHASONE SODIUM PHOSPHATE 10 MG: 10 INJECTION, SOLUTION INTRAMUSCULAR; INTRAVENOUS at 09:15

## 2023-10-03 RX ADMIN — PROPOFOL 100 MCG/KG/MIN: 10 INJECTION, EMULSION INTRAVENOUS at 09:52

## 2023-10-03 RX ADMIN — Medication 2 G: at 09:29

## 2023-10-03 RX ADMIN — PROPOFOL 40 MG: 10 INJECTION, EMULSION INTRAVENOUS at 09:56

## 2023-10-03 RX ADMIN — SODIUM CHLORIDE, POTASSIUM CHLORIDE, SODIUM LACTATE AND CALCIUM CHLORIDE: 600; 310; 30; 20 INJECTION, SOLUTION INTRAVENOUS at 09:08

## 2023-10-03 RX ADMIN — ONDANSETRON 4 MG: 2 INJECTION INTRAMUSCULAR; INTRAVENOUS at 09:54

## 2023-10-03 RX ADMIN — BUPIVACAINE 20 ML: 13.3 INJECTION, SUSPENSION, LIPOSOMAL INFILTRATION at 09:15

## 2023-10-03 ASSESSMENT — ACTIVITIES OF DAILY LIVING (ADL)
ADLS_ACUITY_SCORE: 35
ADLS_ACUITY_SCORE: 35

## 2023-10-03 NOTE — ANESTHESIA CARE TRANSFER NOTE
Patient: Tiffanie Alejo    Procedure: Procedure(s):  HERNIORRHAPHY, UMBILICAL, OPEN       Diagnosis: Umbilical hernia with obstruction, without gangrene [K42.0]  Diagnosis Additional Information: No value filed.    Anesthesia Type:   MAC     Note:    Oropharynx: oropharynx clear of all foreign objects  Level of Consciousness: drowsy  Oxygen Supplementation: room air    Independent Airway: airway patency satisfactory and stable  Dentition: dentition unchanged  Vital Signs Stable: post-procedure vital signs reviewed and stable  Report to RN Given: handoff report given  Patient transferred to: Phase II    Handoff Report: Identifed the Patient, Identified the Reponsible Provider, Reviewed the pertinent medical history, Discussed the surgical course, Reviewed Intra-OP anesthesia mangement and issues during anesthesia, Set expectations for post-procedure period and Allowed opportunity for questions and acknowledgement of understanding    Vitals:  Vitals Value Taken Time   /73 10/03/23 1230   Temp 98  F (36.7  C) 10/03/23 1230   Pulse 91 10/03/23 1230   Resp 16 10/03/23 1230   SpO2 93 % 10/03/23 1120       Electronically Signed By: LEILA Alcantara CRNA  October 3, 2023  1:09 PM

## 2023-10-03 NOTE — ANESTHESIA PROCEDURE NOTES
TAP Procedure Note    Pre-Procedure   Staff -        CRNA: Pito Robins APRN CRNA       Performed By: CRNA       Location: OR       Procedure Start/Stop Times: 10/3/2023 9:15 AM and 10/3/2023 9:25 AM       Pre-Anesthestic Checklist: patient identified, IV checked, site marked, risks and benefits discussed, informed consent, monitors and equipment checked, pre-op evaluation, at physician/surgeon's request and post-op pain management  Timeout:       Correct Patient: Yes        Correct Procedure: Yes        Correct Site: Yes        Correct Position: Yes        Correct Laterality: Yes        Site Marked: Yes  Procedure Documentation  Procedure: TAP       Diagnosis: POST OP PAIN CONTROL       Laterality: bilateral       Patient Position: supine       Skin prep: Chloraprep       Needle Type: insulated and short bevel       Needle Gauge: 20.        Needle Length (Inches): 2        Ultrasound guided       1. Ultrasound was used to identify targeted nerve, plexus, vascular marker, or fascial plane and place a needle adjacent to it in real-time.       2. Ultrasound was used to visualize the spread of anesthetic in close proximity to the above referenced structure.       3. A permanent image is entered into the patient's record.       4. The visualized anatomic structures appeared normal.       5. There were no apparent abnormal pathologic findings.    Assessment/Narrative         The placement was negative for: blood aspirated, painful injection and site bleeding       Paresthesias: No.       Bolus given via needle..        Secured via.        Insertion/Infusion Method: Single Shot       Complications: none       Injection made incrementally with aspirations every 5 mL.    Medication(s) Administered   Bupivacaine 0.25% PF (Infiltration) - Infiltration   30 mL - 10/3/2023 9:15:00 AM  Bupivacaine liposome (Exparel) 1.3% LA inj susp (Infiltration) - Infiltration   20 mL - 10/3/2023 9:15:00 AM  Dexamethasone 10 mg/mL PF  "(Perineural) - Perineural   10 mg - 10/3/2023 9:15:00 AM  Medication Administration Time: 10/3/2023 9:15 AM     Comments:  Risks for regional anesthesia include but not limited to: infection, seizures, continued weakness or numbness, pain at injection site, injury to blood vessels      FOR [facility] ([facility] [place]) ONLY:   Pain Team Contact information: please page the Pain Team Via [employer]. Search \"Pain\". During daytime hours, please page the attending first. At night please page the resident first.      "

## 2023-10-03 NOTE — H&P
Surgery Consult Clinic Note      RE: Tiffanie Alejo  : 1990        Chief Complaint:  Umbilical bulge    History of Present Illness:  Dr. Murray originally saw Mrs. Alejo on 2023 for evaluation regarding abdominal bulge.  Please refer to that note for further detail.  She is here today to update her H&P.    She specifically denies fevers, chills, nausea, vomiting, chest pain, shortness of breath, palpitations, sore throat, cough, or generalized feeling ill.      Medical history:  Past Medical History:   Diagnosis Date    Gluten intolerance     Lactose Intolerance 2013    Ovarian cyst 2013    Spondylolisthesis at L5-S1 level     grade 1    Urinary incontinence     urge       Surgical history:  Past Surgical History:   Procedure Laterality Date    ADENOIDECTOMY       SECTION N/A 2019    Procedure:  SECTION;  Surgeon: Ankush Godinez MD;  Location: [facility]     SECTION N/A 2023    Procedure:  SECTION with delivery of viable baby girl @ 0850;  Surgeon: Ankush Godinez MD;  Location: [facility]    COLONOSCOPY  2014    ESOPHAGOGASTRODUODENOSCOPY  2014    GI SURGERY  3/7/13    colonoscopy    LAPAROSCOPY DIAGNOSTIC (GENERAL)      TONSILLECTOMY      [place] Teeth Extraction         Family history:  Family History   Problem Relation Age of Onset    Heart Disease Mother 50        SCAD/AMI 2nd to SCAD    Breast Cancer Mother 54    Family History Negative Father         interstitial lung disease.       Medications:  Prior to Admission medications    Medication Sig Start Date End Date Taking? Authorizing Provider   acetaminophen (TYLENOL) 500 MG tablet Take 500-1,000 mg by mouth every 6 hours as needed for mild pain   Yes Reported, Patient   famotidine (PEPCID) 20 MG tablet Take 20 mg by mouth daily   Yes Reported, Patient   ibuprofen (ADVIL/MOTRIN) 800 MG tablet Take 1 tablet (800 mg) by mouth every 6 hours 23  Yes Ankush Godinez MD   norethindrone  "(MICRONOR) 0.35 MG tablet Take 1 tablet (0.35 mg) by mouth daily 3/3/23  Yes Ankush Godinez MD   vitamin D3 (CHOLECALCIFEROL) 1.25 MG (16465 UT) capsule Take 1 capsule (50,000 Units) by mouth every 7 days 2/23/23  Yes Pinky Medel MD   polyethylene glycol (MIRALAX/GLYCOLAX) powder Take 1 capful by mouth daily.  10/11/13  Reported, Patient     Allergies:  The patient is allergic to cats and wheat extract.  .  Social history:  Social History     Tobacco Use    Smoking status: Never    Smokeless tobacco: Never   Substance Use Topics    Alcohol use: Yes     Alcohol/week: 1.7 standard drinks of alcohol     Types: 2 Cans of beer per week     Comment: social - weekly     Marital status: .    Review of Systems:    Constitutional: Negative for fever, chills and weight loss.   HENT: Negative for ear pain, nosebleeds, congestion, sore throat, tinnitus and ear discharge.    Eyes: Negative for blurred vision, double vision, photophobia and pain.   Respiratory: Negative for cough, hemoptysis, shortness of breath, wheezing and stridor.    Cardiovascular: Negative for chest pain, palpitations and orthopnea.   Gastrointestinal: Negative for heartburn, nausea, vomiting, abdominal pain and blood in stool.   Genitourinary: Negative for urgency, frequency and hematuria.   Musculoskeletal: Negative for myalgias, back pain and joint pain.   Neurological: Negative for tingling, speech change and headaches.   Endo/Heme/Allergies: Does not bruise/bleed easily.   Psychiatric/Behavioral: Negative for depression, suicidal ideas and hallucinations. The patient is not nervous/anxious.    Physical Examination:  /70   Pulse 80   Temp 98.1  F (36.7  C) (Tympanic)   Resp 16   Ht 1.575 m (5' 2\")   Wt 54.9 kg (121 lb)   SpO2 97%   Breastfeeding Yes   BMI 22.13 kg/m    General: Alert and orientedx4, no acute distress, well-developed/well-nourished, ambulating without assistance  HEENT: normocephalic atraumatic, extraocular " movements intact, PERRL Sclerae anicteric; Trachea mideline, no JVD  Chest:   Clear to auscultation bilaterally.  Cardiac: S1S2 , regular rate and rhythm without additional sounds  Abdomen: Soft, non-tender, non-distended  Extremities: Cursory exam unremarkable.  No peripheral edema noted.  Skin: Warm, dry, less than 2 sec cap refill  Neuro: CN 2-12 grossly intact, no focal deficit, GCS 15  Psych: Pleasant, calm, asks appropriate questions      Assessment/Plan:  #1 Open umbilical hernia repair    We had a discussion about umbilical hernias.  We discussed the risks, benefits and alternatives including but not limited to the risk of bleeding, infection, mesh infection, recurrence, scar formation and chronic pain.   Her questions were asked and answered.  We will proceed with procedure as scheduled.      Renée Blancas [facility]  [address] [place] [address]    [id number]    Referring Provider:  No referring provider defined for this encounter.     Primary Care Provider:  Pinky Medel

## 2023-10-03 NOTE — OP NOTE
PREOPERATIVE DIAGNOSIS:  Umbilical hernia.      POSTOPERATIVE DIAGNOSIS:  Umbilical hernia. Rectus diastasis      PROCEDURE:  Open umbilical hernia repair      HISTORY:  Please see the corresponding clinic note for further details and indication.      FINDINGS:  There was a 0.5 cm umbilical defect with incarcerated preperitoneal fat .      DESCRIPTION OF PROCEDURE:  With the patient in the supine position on the operating table, general anesthesia was induced.  The abdomen was prepped and draped sterilely and the requisite timeout pause observed during which the patient's correct identity and planned procedure were confirmed  by the operating personnel in attendance.  A curvilinear incision was made in the supraumbilical region and carried through full thickness skin and subcutaneous tissue with bleeding controlled by electrocautery. Did take extra skin with this due to laxity in skin from pregnancy.   The incision was carried through full thickness skin and subcutaneous tissue to the fascia and the umbilical pedicle was released from its fascial attachments exposing the umbilical hernia.   Incarcerated preperitoneal fat was resected The fascia was cleared circumferentially for 3 cm. The defect appeared amenable to primary closure. This was done with 0-PDS interrupted x 3. The abdominal wall was noted to have significant laxity so a portion of this was plicated with 2-0 vicryl suture, interrupted.  A  The umbilical pedicle was reattached to the fascia with interrupted 3-0 Vicryl to reconstruct the umbilicus in a cosmetic fashion.  The skin was closed in layers with interrupted 3-0 Vicryl.  The skin was reapproximated with subdermal 4-0 monocryl.  A sterile dressing was applied and the patient was transported to the recovery room in satisfactory condition.  The estimated blood loss was minimal and  there were no apparent complications.  The procedure was well tolerated and the patient left the operating room in  good condition upon confirmation that the final sponge, needle and instrument counts were correct.     MD Bell Hess actively first assisted during this operation providing necessary retraction and exposure as well as maintaining hemostasis and a clear operative field. This was helpful in allowing the operation to proceed in a safe and expeditious manner. She was present for the entire duration of the operation.

## 2023-10-03 NOTE — ANESTHESIA POSTPROCEDURE EVALUATION
Patient: Tiffanie Alejo    Procedure: Procedure(s):  HERNIORRHAPHY, UMBILICAL, OPEN       Anesthesia Type:  MAC    Note:  Disposition: Outpatient   Postop Pain Control: Uneventful            Sign Out: Well controlled pain   PONV: No   Neuro/Psych: Uneventful            Sign Out: Acceptable/Baseline neuro status   Airway/Respiratory: Uneventful            Sign Out: Acceptable/Baseline resp. status   CV/Hemodynamics: Uneventful            Sign Out: Acceptable CV status; No obvious hypovolemia; No obvious fluid overload   Other NRE: NONE   DID A NON-ROUTINE EVENT OCCUR? No         Last vitals:  Vitals Value Taken Time   /73 10/03/23 1230   Temp 98  F (36.7  C) 10/03/23 1230   Pulse 91 10/03/23 1230   Resp 16 10/03/23 1230   SpO2 93 % 10/03/23 1120       Electronically Signed By: LEILA Alcantara CRNA  October 3, 2023  1:07 PM

## 2023-10-03 NOTE — ANESTHESIA POSTPROCEDURE EVALUATION
Patient: Tiffanie Alejo    Procedure: Procedure(s):  HERNIORRHAPHY, UMBILICAL, OPEN       Anesthesia Type:  MAC    Note:  Disposition: Outpatient   Postop Pain Control: Uneventful            Sign Out: Well controlled pain   PONV: No   Neuro/Psych: Uneventful            Sign Out: Acceptable/Baseline neuro status   Airway/Respiratory: Uneventful            Sign Out: Acceptable/Baseline resp. status   CV/Hemodynamics: Uneventful            Sign Out: Acceptable CV status; No obvious hypovolemia; No obvious fluid overload   Other NRE: NONE   DID A NON-ROUTINE EVENT OCCUR? No         Last vitals:  Vitals Value Taken Time   /73 10/03/23 1230   Temp 98  F (36.7  C) 10/03/23 1230   Pulse 91 10/03/23 1230   Resp 16 10/03/23 1230   SpO2 93 % 10/03/23 1120       Electronically Signed By: LEILA Alcantara CRNA  October 3, 2023  1:09 PM

## 2023-10-03 NOTE — OR NURSING
Patient and responsible adult given discharge instructions with no questions regarding instructions. Mehnaz score 19. Pain level 0/10.  Discharged from unit via wheelchair. Patient discharged to home with   .

## 2023-10-18 ENCOUNTER — OFFICE VISIT (OUTPATIENT)
Dept: SURGERY | Facility: OTHER | Age: 33
End: 2023-10-18
Attending: SURGERY
Payer: COMMERCIAL

## 2023-10-18 VITALS
TEMPERATURE: 97.6 F | DIASTOLIC BLOOD PRESSURE: 60 MMHG | OXYGEN SATURATION: 98 % | HEIGHT: 62 IN | HEART RATE: 78 BPM | WEIGHT: 120 LBS | SYSTOLIC BLOOD PRESSURE: 96 MMHG | BODY MASS INDEX: 22.08 KG/M2

## 2023-10-18 DIAGNOSIS — Z98.890 POSTOPERATIVE STATE: Primary | ICD-10-CM

## 2023-10-18 PROCEDURE — 99212 OFFICE O/P EST SF 10 MIN: CPT | Performed by: SURGERY

## 2023-10-18 ASSESSMENT — PAIN SCALES - GENERAL: PAINLEVEL: NO PAIN (0)

## 2023-10-23 NOTE — PROGRESS NOTES
"[facility] Progress Note    HPI: 2 weeks s/p umbilical hernia repair       S:  Doing well no concerns. Back at work     O:   Vitals:  BP 96/60 (BP Location: Right arm, Cuff Size: Adult Regular)   Pulse 78   Temp 97.6  F (36.4  C) (Tympanic)   Ht 1.575 m (5' 2\")   Wt 54.4 kg (120 lb)   SpO2 98%   BMI 21.95 kg/m        Physical Exam:  G: alert oriented, no acute distress   ENT: sclera non-icteric   Pulm: no respiratory distress   CVS: RRR  ABD: soft non-tender non-distended, no bulge with valsalva   Ext: WWP     Assessment/Plan:  Doing well, discussed two additional weeks of lifting restriction. Follow up PRN     Bharath Murray MD     "

## 2023-10-24 ENCOUNTER — OFFICE VISIT (OUTPATIENT)
Dept: OBGYN | Facility: OTHER | Age: 33
End: 2023-10-24
Attending: NURSE PRACTITIONER
Payer: COMMERCIAL

## 2023-10-24 VITALS
DIASTOLIC BLOOD PRESSURE: 60 MMHG | BODY MASS INDEX: 22.45 KG/M2 | SYSTOLIC BLOOD PRESSURE: 110 MMHG | OXYGEN SATURATION: 98 % | HEART RATE: 76 BPM | WEIGHT: 122 LBS | HEIGHT: 62 IN

## 2023-10-24 DIAGNOSIS — Z01.419 WELL WOMAN EXAM WITH ROUTINE GYNECOLOGICAL EXAM: Primary | ICD-10-CM

## 2023-10-24 PROCEDURE — 99395 PREV VISIT EST AGE 18-39: CPT | Performed by: NURSE PRACTITIONER

## 2023-10-24 ASSESSMENT — ANXIETY QUESTIONNAIRES
GAD7 TOTAL SCORE: 4
1. FEELING NERVOUS, ANXIOUS, OR ON EDGE: NOT AT ALL
IF YOU CHECKED OFF ANY PROBLEMS ON THIS QUESTIONNAIRE, HOW DIFFICULT HAVE THESE PROBLEMS MADE IT FOR YOU TO DO YOUR WORK, TAKE CARE OF THINGS AT HOME, OR GET ALONG WITH OTHER PEOPLE: SOMEWHAT DIFFICULT
2. NOT BEING ABLE TO STOP OR CONTROL WORRYING: NOT AT ALL
6. BECOMING EASILY ANNOYED OR IRRITABLE: MORE THAN HALF THE DAYS
3. WORRYING TOO MUCH ABOUT DIFFERENT THINGS: SEVERAL DAYS
5. BEING SO RESTLESS THAT IT IS HARD TO SIT STILL: NOT AT ALL
GAD7 TOTAL SCORE: 4
7. FEELING AFRAID AS IF SOMETHING AWFUL MIGHT HAPPEN: NOT AT ALL

## 2023-10-24 ASSESSMENT — PAIN SCALES - GENERAL: PAINLEVEL: NO PAIN (0)

## 2023-10-24 ASSESSMENT — PATIENT HEALTH QUESTIONNAIRE - PHQ9
5. POOR APPETITE OR OVEREATING: SEVERAL DAYS
SUM OF ALL RESPONSES TO PHQ QUESTIONS 1-9: 2

## 2023-10-24 NOTE — PROGRESS NOTES
CC:  Annual exam  HPI:  Tiffanie Alejo is a 33 year old female P2, CD's.  No LMP recorded. (Menstrual status: UNKNOWN).    Breastfeeding and using micronor for contraception.  Periods have not returned.  Questions answered regarding IUD's and handout provided.  Seeing a counselor for PPD and doing well.  No other concern.s  otherwise up to date with pcp.  No other c/o.      Past GYN history:  No STD history  STI testing offered?  Declined       Last PAP smear:  Normal  Mammograms up to date: Will do an initial after weaning.  Mother had breast cancer before menopause.     Past Medical History:   Diagnosis Date    Gluten intolerance     Lactose Intolerance 2013    Ovarian cyst 2013    Spondylolisthesis at L5-S1 level     grade 1    Urinary incontinence     urge       Past Surgical History:   Procedure Laterality Date    ADENOIDECTOMY       SECTION N/A 2019    Procedure:  SECTION;  Surgeon: Ankush Godinez MD;  [facility]: [facility]     SECTION N/A 2023    Procedure:  SECTION with delivery of viable baby girl @ 0850;  Surgeon: Ankush Godinez MD;  Location: [facility]    COLONOSCOPY  2014    ESOPHAGOGASTRODUODENOSCOPY  2014    GI SURGERY  3/7/13    colonoscopy    HERNIORRHAPHY UMBILICAL N/A 10/3/2023    Procedure: HERNIORRHAPHY, UMBILICAL, OPEN;  Surgeon: Bharath Murray MD;  Location: [facility]    LAPAROSCOPY DIAGNOSTIC (GENERAL)      TONSILLECTOMY      [place] Teeth Extraction         Family History   Problem Relation Age of Onset    Heart Disease Mother 50        SCAD/AMI 2nd to SCAD    Breast Cancer Mother 54    Family History Negative Father         interstitial lung disease.       Current Outpatient Medications   Medication Sig Dispense Refill    acetaminophen (TYLENOL) 500 MG tablet Take 500-1,000 mg by mouth every 6 hours as needed for mild pain      famotidine (PEPCID) 20 MG tablet Take 20 mg by mouth daily      ibuprofen (ADVIL/MOTRIN) 800 MG tablet  "Take 1 tablet (800 mg) by mouth every 6 hours 40 tablet 1    norethindrone (MICRONOR) 0.35 MG tablet Take 1 tablet (0.35 mg) by mouth daily 90 tablet 3    vitamin D3 (CHOLECALCIFEROL) 1.25 MG (94588 UT) capsule Take 1 capsule (50,000 Units) by mouth every 7 days 12 capsule 0       Allergies: Cats and Wheat extract    ROS:  CONSTITUTIONAL:NEGATIVE for fever, chills, change in weight  BREAST: NEGATIVE for masses, tenderness or discharge  : negative for, dysparunia, incontinence, and vaginal discharge  PSYCHIATRIC: NEGATIVE for changes in mood or affect    EXAM:  Blood pressure 110/60, pulse 76, height 1.575 m (5' 2\"), weight 55.3 kg (122 lb), SpO2 98%, currently breastfeeding.   BMI= Body mass index is 22.31 kg/m .  General - pleasant female in no acute distress.  Breast - no nodularity, asymmetry or nipple discharge bilaterally.  Abdomen - soft, nontender, nondistended, no hepatosplenomegaly.  Pelvic - EG: normal adult female, BUS: within normal limits, Vagina: well rugated, no discharge, Cervix: no lesions or CMT, Uterus: firm, normal sized and nontender, Adnexae: no masses or tenderness.  Rectovaginal - deferred.  Musculoskeletal - no gross deformities.  Neurological - normal strength, sensation, and mental status.      ASSESSMENT/PLAN:  (Z01.419) Well woman exam with routine gynecological exam  (primary encounter diagnosis)  Comment:   Plan: return annually and as needed.       Discussed exercise and healthy eating, including calcium intake.  She should return to the clinic in one year, or sooner if problems arise.    "

## 2023-11-02 ENCOUNTER — THERAPY VISIT (OUTPATIENT)
Dept: PHYSICAL THERAPY | Facility: HOSPITAL | Age: 33
End: 2023-11-02
Attending: FAMILY MEDICINE
Payer: COMMERCIAL

## 2023-11-02 DIAGNOSIS — M62.89 PELVIC FLOOR DYSFUNCTION IN FEMALE: ICD-10-CM

## 2023-11-02 DIAGNOSIS — M62.08 DIASTASIS RECTI: Primary | ICD-10-CM

## 2023-11-02 PROCEDURE — 97110 THERAPEUTIC EXERCISES: CPT | Mod: GP

## 2023-11-02 PROCEDURE — 97530 THERAPEUTIC ACTIVITIES: CPT | Mod: GP

## 2023-12-19 ENCOUNTER — THERAPY VISIT (OUTPATIENT)
Dept: PHYSICAL THERAPY | Facility: HOSPITAL | Age: 33
End: 2023-12-19
Attending: FAMILY MEDICINE
Payer: COMMERCIAL

## 2023-12-19 DIAGNOSIS — M62.08 DIASTASIS RECTI: Primary | ICD-10-CM

## 2023-12-19 DIAGNOSIS — M62.89 PELVIC FLOOR DYSFUNCTION IN FEMALE: ICD-10-CM

## 2023-12-19 PROCEDURE — 97530 THERAPEUTIC ACTIVITIES: CPT | Mod: GP

## 2023-12-19 PROCEDURE — 97140 MANUAL THERAPY 1/> REGIONS: CPT | Mod: GP

## 2023-12-19 PROCEDURE — 97110 THERAPEUTIC EXERCISES: CPT | Mod: GP

## 2024-01-23 ENCOUNTER — THERAPY VISIT (OUTPATIENT)
Dept: PHYSICAL THERAPY | Facility: HOSPITAL | Age: 34
End: 2024-01-23
Attending: FAMILY MEDICINE
Payer: COMMERCIAL

## 2024-01-23 DIAGNOSIS — M62.08 DIASTASIS RECTI: Primary | ICD-10-CM

## 2024-01-23 PROCEDURE — 97110 THERAPEUTIC EXERCISES: CPT | Mod: GP

## 2024-01-23 PROCEDURE — 97140 MANUAL THERAPY 1/> REGIONS: CPT | Mod: GP

## 2024-01-23 PROCEDURE — 97530 THERAPEUTIC ACTIVITIES: CPT | Mod: GP

## 2024-01-29 ENCOUNTER — MYC MEDICAL ADVICE (OUTPATIENT)
Dept: OBGYN | Facility: OTHER | Age: 34
End: 2024-01-29

## 2024-01-29 DIAGNOSIS — Z30.011 ENCOUNTER FOR INITIAL PRESCRIPTION OF CONTRACEPTIVE PILLS: Primary | ICD-10-CM

## 2024-01-29 RX ORDER — NORGESTIMATE AND ETHINYL ESTRADIOL 0.25-0.035
1 KIT ORAL DAILY
Qty: 84 TABLET | Refills: 3 | Status: SHIPPED | OUTPATIENT
Start: 2024-01-29

## 2024-02-24 ENCOUNTER — HOSPITAL ENCOUNTER (EMERGENCY)
Facility: HOSPITAL | Age: 34
Discharge: HOME | End: 2024-02-24
Attending: PHYSICIAN ASSISTANT | Admitting: PHYSICIAN ASSISTANT
Payer: COMMERCIAL

## 2024-02-24 VITALS
HEIGHT: 62 IN | HEART RATE: 75 BPM | BODY MASS INDEX: 24.11 KG/M2 | DIASTOLIC BLOOD PRESSURE: 75 MMHG | TEMPERATURE: 98.2 F | WEIGHT: 131 LBS | OXYGEN SATURATION: 98 % | SYSTOLIC BLOOD PRESSURE: 109 MMHG | RESPIRATION RATE: 16 BRPM

## 2024-02-24 DIAGNOSIS — J02.0 ACUTE STREPTOCOCCAL PHARYNGITIS: ICD-10-CM

## 2024-02-24 LAB — GROUP A STREP BY PCR: DETECTED

## 2024-02-24 PROCEDURE — G0463 HOSPITAL OUTPT CLINIC VISIT: HCPCS

## 2024-02-24 PROCEDURE — 87651 STREP A DNA AMP PROBE: CPT | Performed by: PHYSICIAN ASSISTANT

## 2024-02-24 PROCEDURE — 99213 OFFICE O/P EST LOW 20 MIN: CPT | Performed by: PHYSICIAN ASSISTANT

## 2024-02-24 RX ORDER — PENICILLIN V POTASSIUM 500 MG/1
500 TABLET, FILM COATED ORAL 2 TIMES DAILY
Qty: 20 TABLET | Refills: 0 | Status: SHIPPED | OUTPATIENT
Start: 2024-02-24 | End: 2024-03-05

## 2024-02-24 NOTE — ED TRIAGE NOTES
Patient presents to urgent care to for sore throat that started yesterday. Patient took ibuprofen at 1545. Patient had a fever today.  Patient has strep test at home and she tested at home and test positive.

## 2024-02-25 NOTE — ED PROVIDER NOTES
History     Chief Complaint   Patient presents with    Pharyngitis     HPI  Tiffanie Alejo is a 33 year old female who presents with sore throat and fever since this am, has home strep kit which was positive. Eating and drinking ok. H/o tonsillectomy.     Allergies:  Allergies   Allergen Reactions    Cats      Facial swelling    Wheat Extract      Stomach pains, constipation and diarrhea       Problem List:    Patient Active Problem List    Diagnosis Date Noted    Umbilical hernia without obstruction and without gangrene 2023     Priority: Medium    Migraine with aura and without status migrainosus, not intractable 2023     Priority: Medium    Irritable bowel syndrome with diarrhea 2023     Priority: Medium    Pelvic floor dysfunction in female 2023     Priority: Medium    Diastasis recti 2023     Priority: Medium    Panic attack 2023     Priority: Medium    Hypovitaminosis D 2023     Priority: Medium    Other fatigue 2023     Priority: Medium    Post partum depression 2023     Priority: Medium     delivery delivered 2023     Priority: Medium     Herbert 23      Normal pregnancy in multigravida in second trimester 2022     Priority: Medium     NIPT neg  surprize  no covid vax  Hx of CD and abruption - level II  Breast - pumping          Past Medical History:    Past Medical History:   Diagnosis Date    Gluten intolerance     Lactose Intolerance 2013    Ovarian cyst 2013    Spondylolisthesis at L5-S1 level     Urinary incontinence        Past Surgical History:    Past Surgical History:   Procedure Laterality Date    ADENOIDECTOMY       SECTION N/A 2019    Procedure:  SECTION;  Surgeon: Ankush Godinez MD;  Location: [facility]     SECTION N/A 2023    Procedure:  SECTION with delivery of viable baby girl @ 0850;  Surgeon: Ankush Godinez MD;  Location: [facility]    COLONOSCOPY  2014     "ESOPHAGOGASTRODUODENOSCOPY  7/2014    GI SURGERY  3/7/13    colonoscopy    HERNIORRHAPHY UMBILICAL N/A 10/3/2023    Procedure: HERNIORRHAPHY, UMBILICAL, OPEN;  Surgeon: Bharath Murray MD;  Location: [facility]    LAPAROSCOPY DIAGNOSTIC (GENERAL)      TONSILLECTOMY  2010    Jaffrey Teeth Extraction         Family History:    Family History   Problem Relation Age of Onset    Heart Disease Mother 50        SCAD/AMI 2nd to SCAD    Breast Cancer Mother 54    Family History Negative Father         interstitial lung disease.       Social History:  Marital Status:   [2]  Social History     Tobacco Use    Smoking status: Never    Smokeless tobacco: Never   Vaping Use    Vaping Use: Never used   Substance Use Topics    Alcohol use: Yes     Alcohol/week: 1.7 standard drinks of alcohol     Types: 2 Cans of beer per week     Comment: social - weekly    Drug use: No        Medications:    acetaminophen (TYLENOL) 500 MG tablet  famotidine (PEPCID) 20 MG tablet  ibuprofen (ADVIL/MOTRIN) 800 MG tablet  norgestimate-ethinyl estradiol (ORTHO-CYCLEN) 0.25-35 MG-MCG tablet  penicillin V (VEETID) 500 MG tablet  vitamin D3 (CHOLECALCIFEROL) 1.25 MG (94359 UT) capsule          Review of Systems   All other systems reviewed and are negative.      Physical Exam   BP: 109/75  Pulse: 75  Temp: 98.2  F (36.8  C)  Resp: 16  Height: 157.5 cm (5' 2\")  Weight: 59.4 kg (131 lb)  SpO2: 98 %      Physical Exam  Vitals and nursing note reviewed.   Constitutional:       Appearance: Normal appearance.   HENT:      Head: Normocephalic and atraumatic.      Right Ear: Tympanic membrane, ear canal and external ear normal.      Left Ear: Tympanic membrane, ear canal and external ear normal.      Nose: Nose normal.      Mouth/Throat:      Mouth: Mucous membranes are moist.      Pharynx: Oropharynx is clear. Posterior oropharyngeal erythema present.      Tonsils: No tonsillar exudate or tonsillar abscesses. 0 on the right. 0 on the left.   Cardiovascular:    "   Rate and Rhythm: Normal rate and regular rhythm.      Pulses: Normal pulses.      Heart sounds: Normal heart sounds.   Pulmonary:      Effort: Pulmonary effort is normal.      Breath sounds: Normal breath sounds.   Musculoskeletal:         General: Normal range of motion.      Cervical back: Normal range of motion. No rigidity.   Lymphadenopathy:      Cervical: Cervical adenopathy present.   Skin:     General: Skin is warm.      Capillary Refill: Capillary refill takes less than 2 seconds.   Neurological:      Mental Status: She is alert.         ED Course                 Procedures            Results for orders placed or performed during the hospital encounter of 02/24/24 (from the past 24 hour(s))   Group A Streptococcus PCR Throat Swab    Specimen: Throat; Swab   Result Value Ref Range    Group A strep by PCR Detected (A) Not Detected    Narrative    The Xpert Xpress Strep A test, performed on the [employer], is a rapid, qualitative in vitro diagnostic test for the detection of Streptococcus pyogenes (Group A ß-hemolytic Streptococcus, Strep A) in throat swab specimens from patients with signs and symptoms of pharyngitis. The Xpert Xpress Strep A test can be used as an aid in the diagnosis of Group A Streptococcal pharyngitis. The assay is not intended to monitor treatment for Group A Streptococcus infections. The Xpert Xpress Strep A test utilizes an automated real-time polymerase chain reaction (PCR) to detect Streptococcus pyogenes DNA.   Extra Tube *Canceled*    Narrative    The following orders were created for panel order Extra Tube.  Procedure                               Abnormality         Status                     ---------                               -----------         ------                     Extra Blue Top Tube[[id number]]                                                         Extra Red Top Tube[[id number]]                                                          Extra  Heparinized Syringe[[id number]]                                                     Please view results for these tests on the individual orders.       Medications - No data to display    Assessments & Plan (with Medical Decision Making)   Rapid strep positive. No signs of peritonsillar abscess. RX for PCN VK was sent. Pt was discharged home following in stable condition.     Plan:  Take the Penicillin as prescribed for strep throat.   Alternate between Ibuprofen and Tylenol every 4 hours for fever control.  Increase fluids and rest.  Use a humidifier at night.  Return here with any difficulty breathing or new/concerning symptoms.       I have reviewed the nursing notes.    I have reviewed the findings, diagnosis, plan and need for follow up with the patient.      New Prescriptions    PENICILLIN V (VEETID) 500 MG TABLET    Take 1 tablet (500 mg) by mouth 2 times daily for 10 days       Final diagnoses:   Acute streptococcal pharyngitis       2/24/2024   [facility]

## 2024-02-25 NOTE — DISCHARGE INSTRUCTIONS
Take the Penicillin as prescribed for strep throat.   Alternate between Ibuprofen and Tylenol every 4 hours for fever control.  Increase fluids and rest.  Use a humidifier at night.  Return here with any difficulty breathing or new/concerning symptoms.

## 2024-02-27 ENCOUNTER — THERAPY VISIT (OUTPATIENT)
Dept: PHYSICAL THERAPY | Facility: HOSPITAL | Age: 34
End: 2024-02-27
Attending: FAMILY MEDICINE
Payer: COMMERCIAL

## 2024-02-27 DIAGNOSIS — M62.89 PELVIC FLOOR DYSFUNCTION IN FEMALE: ICD-10-CM

## 2024-02-27 DIAGNOSIS — M62.08 DIASTASIS RECTI: Primary | ICD-10-CM

## 2024-02-27 PROCEDURE — 97530 THERAPEUTIC ACTIVITIES: CPT | Mod: GP

## 2024-02-27 PROCEDURE — 97110 THERAPEUTIC EXERCISES: CPT | Mod: GP

## 2024-02-27 PROCEDURE — 97140 MANUAL THERAPY 1/> REGIONS: CPT | Mod: GP

## 2024-03-03 ENCOUNTER — MYC MEDICAL ADVICE (OUTPATIENT)
Dept: FAMILY MEDICINE | Facility: OTHER | Age: 34
End: 2024-03-03

## 2024-04-02 ENCOUNTER — THERAPY VISIT (OUTPATIENT)
Dept: PHYSICAL THERAPY | Facility: HOSPITAL | Age: 34
End: 2024-04-02
Attending: FAMILY MEDICINE
Payer: COMMERCIAL

## 2024-04-02 DIAGNOSIS — M62.08 DIASTASIS RECTI: Primary | ICD-10-CM

## 2024-04-02 DIAGNOSIS — M62.89 PELVIC FLOOR DYSFUNCTION IN FEMALE: ICD-10-CM

## 2024-04-02 PROCEDURE — 97110 THERAPEUTIC EXERCISES: CPT | Mod: GP

## 2024-04-02 PROCEDURE — 97530 THERAPEUTIC ACTIVITIES: CPT | Mod: GP

## 2024-05-07 ENCOUNTER — THERAPY VISIT (OUTPATIENT)
Dept: PHYSICAL THERAPY | Facility: HOSPITAL | Age: 34
End: 2024-05-07
Attending: FAMILY MEDICINE
Payer: COMMERCIAL

## 2024-05-07 DIAGNOSIS — M62.89 PELVIC FLOOR DYSFUNCTION IN FEMALE: ICD-10-CM

## 2024-05-07 DIAGNOSIS — M62.08 DIASTASIS RECTI: Primary | ICD-10-CM

## 2024-05-07 PROCEDURE — 97530 THERAPEUTIC ACTIVITIES: CPT | Mod: GP

## 2024-05-07 PROCEDURE — 97110 THERAPEUTIC EXERCISES: CPT | Mod: GP

## 2024-11-21 ENCOUNTER — ANCILLARY PROCEDURE (OUTPATIENT)
Dept: GENERAL RADIOLOGY | Facility: OTHER | Age: 34
End: 2024-11-21
Attending: FAMILY MEDICINE
Payer: COMMERCIAL

## 2024-11-21 ENCOUNTER — OFFICE VISIT (OUTPATIENT)
Dept: FAMILY MEDICINE | Facility: OTHER | Age: 34
End: 2024-11-21
Attending: FAMILY MEDICINE
Payer: COMMERCIAL

## 2024-11-21 VITALS
DIASTOLIC BLOOD PRESSURE: 74 MMHG | TEMPERATURE: 98.3 F | SYSTOLIC BLOOD PRESSURE: 126 MMHG | HEIGHT: 62 IN | RESPIRATION RATE: 16 BRPM | OXYGEN SATURATION: 99 % | WEIGHT: 131.2 LBS | HEART RATE: 65 BPM | BODY MASS INDEX: 24.14 KG/M2

## 2024-11-21 DIAGNOSIS — Z71.89 ACP (ADVANCE CARE PLANNING): ICD-10-CM

## 2024-11-21 DIAGNOSIS — M54.2 CERVICALGIA: ICD-10-CM

## 2024-11-21 DIAGNOSIS — G44.209 TENSION HEADACHE: ICD-10-CM

## 2024-11-21 DIAGNOSIS — G44.209 TENSION HEADACHE: Primary | ICD-10-CM

## 2024-11-21 RX ORDER — CYCLOBENZAPRINE HCL 5 MG
5-10 TABLET ORAL 2 TIMES DAILY PRN
Qty: 30 TABLET | Refills: 1 | Status: SHIPPED | OUTPATIENT
Start: 2024-11-21

## 2024-11-21 ASSESSMENT — PAIN SCALES - GENERAL: PAINLEVEL_OUTOF10: MILD PAIN (3)

## 2024-11-21 ASSESSMENT — ENCOUNTER SYMPTOMS: HEADACHES: 1

## 2024-11-21 NOTE — PROGRESS NOTES
"The longitudinal plan of care for the diagnosis(es)/condition(s) as documented were addressed during this visit. Due to the added complexity in care, I will continue to support Tiffanie in the subsequent management and with ongoing continuity of care.    Assessment & Plan     Tension headache  Limited rom in neck, needs PT, heat, muscle relaxer, massage, working on posture  - cyclobenzaprine (FLEXERIL) 5 MG tablet; Take 1-2 tablets (5-10 mg) by mouth 2 times daily as needed for muscle spasms.  - XR CERVICAL SPINE 2/3 VWS (Clinic Performed); Future  - Physical Therapy  Referral; Future    Cervicalgia    - cyclobenzaprine (FLEXERIL) 5 MG tablet; Take 1-2 tablets (5-10 mg) by mouth 2 times daily as needed for muscle spasms.  - XR CERVICAL SPINE 2/3 VWS (Clinic Performed); Future  - Physical Therapy  Referral; Future    ACP (advance care planning)  Needs cpe        Patient was agreeable to this plan and had no further questions.  There are no Patient Instructions on file for this visit.    No follow-ups on file.    Subjective   Tiffanie is a 34 year old, presenting for the following health issues:  Headache        11/21/2024     4:05 PM   Additional Questions   Roomed by EMELINA Palacios   Accompanied by self`     Headache     History of Present Illness       Headaches:   Since the patient's last clinic visit, headaches are: worsened  The patient is getting headaches:  Every day  She is not able to do normal daily activities when she has a migraine.  The patient is taking the following rescue/relief medications:  Ibuprofen (Advil, Motrin) and Tylenol   Patient states \"I get only a small amount of relief\" from the rescue/relief medications.   The patient is taking the following medications to prevent migraines:  No medications to prevent migraines  In the past 4 weeks, the patient has gone to an Urgent Care or Emergency Room 0 times times due to headaches.   She is taking medications regularly.   " "    Migraine   Since your last clinic visit, how have your headaches changed?  Worsened  How often are you getting headaches or migraines? everyday   Are you able to do normal daily activities when you have a migraine? No  Are you taking rescue/relief medications? (Select all that apply) ibuprofen (Advil, Motrin) and Tylenol  How helpful is your rescue/relief medication?  I get only a small amount of relief  Are you taking any medications to prevent migraines? (Select all that apply)  No  In the past 4 weeks, how often have you gone to urgent care or the emergency room because of your headaches?  0        Review of Systems  Constitutional, neuro, ENT, endocrine, pulmonary, cardiac, gastrointestinal, genitourinary, musculoskeletal, integument and psychiatric systems are negative, except as otherwise noted.      Objective    /74 (BP Location: Left arm, Patient Position: Sitting, Cuff Size: Adult Regular)   Pulse 65   Temp 98.3  F (36.8  C) (Tympanic)   Resp 16   Ht 1.575 m (5' 2\")   Wt 59.5 kg (131 lb 3.2 oz)   LMP 10/30/2024 (Approximate)   SpO2 99%   Breastfeeding No   BMI 24.00 kg/m    Body mass index is 24 kg/m .  Physical Exam   GENERAL: alert and no distress  RESP: lungs clear to auscultation - no rales, rhonchi or wheezes  CV: regular rate and rhythm, normal S1 S2, no S3 or S4, no murmur, click or rub, no peripheral edema  MS: decreased range of motion neck  PSYCH: mentation appears normal, affect normal/bright  Cervical Spine Exam: Inspection: normal cervical lordosis  Tender:  medial border of scapula, superior angle of scapula, left paracervical muscles, right paracervical muscles, left trapezius muscles, right trapezius muscles  Non-tender:  occipital nerves, spinous processes, scapula  Range of Motion:  flexion:  decreased, extension: decreased, left lateral bending: decreased, right lateral bending: decreased, left lateral rotation:  decreased, right lateral rotation:  decreased  Strength: " not tested  Special tests:  not done      Results for orders placed or performed in visit on 11/21/24   XR CERVICAL SPINE 2/3 VWS (Clinic Performed)     Status: None    Narrative    Exam: XR CERVICAL SPINE 2/3 VIEWS     History:Female, age 34 years, Tension headache; Cervicalgia    Comparison:  No relevant prior imaging.    Technique: Three views are submitted.    Findings: Bones are normally mineralized. No evidence of acute or  subacute fracture.  Vertebral bodies and posterior elements are well  aligned. Mild endplate degenerative changes at C5-6.           Impression    Impression:  No evidence of acute or subacute bony abnormality.    LUCERO VILLEDA MD         SYSTEM ID:  [id number]           Signed Electronically by: Pinky Medel MD

## 2024-12-09 DIAGNOSIS — Z30.011 ENCOUNTER FOR INITIAL PRESCRIPTION OF CONTRACEPTIVE PILLS: ICD-10-CM

## 2024-12-09 NOTE — TELEPHONE ENCOUNTER
norgestimate-ethinyl estradiol (ORTHO-CYCLEN) 0.25-35 MG-MCG tablet      Last Written Prescription Date:  1-29-24  Last Fill Quantity: 84,   # refills: 3  Last Office Visit: 10-24-24  Future Office visit:       Routing refill request to provider for review/approval because:

## 2024-12-11 RX ORDER — NORGESTIMATE AND ETHINYL ESTRADIOL 0.25-0.035
1 KIT ORAL DAILY
Qty: 84 TABLET | Refills: 3 | Status: SHIPPED | OUTPATIENT
Start: 2024-12-11

## 2024-12-16 ENCOUNTER — THERAPY VISIT (OUTPATIENT)
Dept: PHYSICAL THERAPY | Facility: HOSPITAL | Age: 34
End: 2024-12-16
Attending: FAMILY MEDICINE
Payer: COMMERCIAL

## 2024-12-16 DIAGNOSIS — G44.209 TENSION HEADACHE: ICD-10-CM

## 2024-12-16 DIAGNOSIS — M54.2 CERVICALGIA: ICD-10-CM

## 2024-12-16 PROCEDURE — 97110 THERAPEUTIC EXERCISES: CPT | Mod: GP

## 2024-12-16 PROCEDURE — 97161 PT EVAL LOW COMPLEX 20 MIN: CPT | Mod: GP
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
## 2024-12-29 ENCOUNTER — HEALTH MAINTENANCE LETTER (OUTPATIENT)
Age: 34
End: 2024-12-29

## 2024-12-31 ENCOUNTER — MYC MEDICAL ADVICE (OUTPATIENT)
Dept: FAMILY MEDICINE | Facility: OTHER | Age: 34
End: 2024-12-31

## 2024-12-31 DIAGNOSIS — M54.2 CERVICALGIA: ICD-10-CM

## 2024-12-31 DIAGNOSIS — G44.209 TENSION HEADACHE: ICD-10-CM

## 2025-01-02 RX ORDER — CYCLOBENZAPRINE HCL 5 MG
TABLET ORAL
Qty: 30 TABLET | Refills: 0 | Status: SHIPPED | OUTPATIENT
Start: 2025-01-02

## 2025-01-02 NOTE — TELEPHONE ENCOUNTER
Cyclobenzaprine 5 mg      Last Written Prescription Date:  11/21/2024  Last Fill Quantity: 30,   # refills: 1  Last Office Visit: 11/21/2024  Future Office visit:       Routing refill request to provider for review/approval because:  Drug not on the [facility], [facility] or [facility] refill protocol or controlled substance

## 2025-01-09 ENCOUNTER — THERAPY VISIT (OUTPATIENT)
Dept: PHYSICAL THERAPY | Facility: HOSPITAL | Age: 35
End: 2025-01-09
Attending: FAMILY MEDICINE
Payer: COMMERCIAL

## 2025-01-09 DIAGNOSIS — M54.2 CERVICALGIA: ICD-10-CM

## 2025-01-09 DIAGNOSIS — G44.209 TENSION HEADACHE: Primary | ICD-10-CM

## 2025-01-09 PROCEDURE — 97140 MANUAL THERAPY 1/> REGIONS: CPT | Mod: GP

## 2025-01-09 PROCEDURE — 97530 THERAPEUTIC ACTIVITIES: CPT | Mod: GP

## 2025-01-09 PROCEDURE — 97110 THERAPEUTIC EXERCISES: CPT | Mod: GP

## 2025-01-16 ENCOUNTER — THERAPY VISIT (OUTPATIENT)
Dept: PHYSICAL THERAPY | Facility: HOSPITAL | Age: 35
End: 2025-01-16
Attending: FAMILY MEDICINE
Payer: COMMERCIAL

## 2025-01-16 DIAGNOSIS — M54.2 CERVICALGIA: ICD-10-CM

## 2025-01-16 DIAGNOSIS — G44.209 TENSION HEADACHE: Primary | ICD-10-CM

## 2025-01-16 PROCEDURE — 97012 MECHANICAL TRACTION THERAPY: CPT | Mod: GP

## 2025-01-16 PROCEDURE — 97110 THERAPEUTIC EXERCISES: CPT | Mod: GP

## 2025-01-16 PROCEDURE — 97530 THERAPEUTIC ACTIVITIES: CPT | Mod: GP

## 2025-01-30 ENCOUNTER — THERAPY VISIT (OUTPATIENT)
Dept: PHYSICAL THERAPY | Facility: HOSPITAL | Age: 35
End: 2025-01-30
Attending: FAMILY MEDICINE
Payer: COMMERCIAL

## 2025-01-30 DIAGNOSIS — G44.209 TENSION HEADACHE: Primary | ICD-10-CM

## 2025-01-30 DIAGNOSIS — M54.2 CERVICALGIA: ICD-10-CM

## 2025-01-30 PROCEDURE — 97530 THERAPEUTIC ACTIVITIES: CPT | Mod: GP

## 2025-01-30 PROCEDURE — 97110 THERAPEUTIC EXERCISES: CPT | Mod: GP

## 2025-04-03 ENCOUNTER — OFFICE VISIT (OUTPATIENT)
Dept: OBGYN | Facility: OTHER | Age: 35
End: 2025-04-03
Attending: NURSE PRACTITIONER
Payer: COMMERCIAL

## 2025-04-03 VITALS
BODY MASS INDEX: 23 KG/M2 | SYSTOLIC BLOOD PRESSURE: 98 MMHG | HEIGHT: 62 IN | HEART RATE: 60 BPM | WEIGHT: 125 LBS | DIASTOLIC BLOOD PRESSURE: 62 MMHG

## 2025-04-03 DIAGNOSIS — Z30.014 ENCOUNTER FOR INITIAL PRESCRIPTION OF INTRAUTERINE CONTRACEPTIVE DEVICE (IUD): ICD-10-CM

## 2025-04-03 DIAGNOSIS — Z12.4 SCREENING FOR MALIGNANT NEOPLASM OF CERVIX: Primary | ICD-10-CM

## 2025-04-03 LAB
BACTERIAL VAGINOSIS VAG-IMP: NEGATIVE
C TRACH DNA SPEC QL PROBE+SIG AMP: NEGATIVE
CANDIDA DNA VAG QL NAA+PROBE: NOT DETECTED
CANDIDA GLABRATA / CANDIDA KRUSEI DNA: NOT DETECTED
N GONORRHOEA DNA SPEC QL NAA+PROBE: NEGATIVE
SPECIMEN TYPE: NORMAL
T VAGINALIS DNA VAG QL NAA+PROBE: NOT DETECTED

## 2025-04-03 ASSESSMENT — PAIN SCALES - GENERAL: PAINLEVEL_OUTOF10: NO PAIN (0)

## 2025-04-03 NOTE — PROGRESS NOTES
"[facility]                HPI     Here to discuss possible IUD for contraception.  She is currently using BEATRIS but has frequent break through bleeding and is worried about failure.  Hx of heavy and painful periods which have been better since having children.  Would like long term and reliable option with little or no period.  Discussed options for IUD's in detail with risks and benefits of each.  She would like to plan Mirena IUD when she returns from vacation.  Plan to update pap smear.  Also, her mom has hx of breast cancer and she is wondering about mammograms.  Was dx at age 50. Unsure if she was menopausal and did decline genetics at time of Dx.  Considering early mammogram.             Medications:     Current Outpatient Medications   Medication Sig Dispense Refill    acetaminophen (TYLENOL) 500 MG tablet Take 500-1,000 mg by mouth every 6 hours as needed for mild pain      cyclobenzaprine (FLEXERIL) 5 MG tablet TAKE 1 TO 2 TABLETS BY MOUTH 2 TIMES DAILY AS NEEDED FOR MUSCLE SPASMS 30 tablet 0    famotidine (PEPCID) 20 MG tablet Take 20 mg by mouth as needed.      ibuprofen (ADVIL/MOTRIN) 800 MG tablet Take 1 tablet (800 mg) by mouth every 6 hours 40 tablet 1    norgestimate-ethinyl estradiol (ORTHO-CYCLEN) 0.25-35 MG-MCG tablet TAKE 1 TABLET BY MOUTH DAILY 84 tablet 3     No current facility-administered medications for this visit.                Allergies:   Cats and Wheat extract         Review of Systems:     The 5 point Review of Systems is negative other than noted in the HPI                     Physical Exam:   Blood pressure 98/62, pulse 60, height 1.575 m (5' 2\"), weight 56.7 kg (125 lb), not currently breastfeeding.  Constitutional:   awake, alert, cooperative, no apparent distress, and appears stated age     Genitounirinary:   External Genitalia:  General appearance; normal  Vagina:  Discharge absent, Lesions absent  Cervix:  Lesions absent, Discharge absent, Tenderness absent      "         Data:   No results found for any visits on 04/03/25.  Labs pending.          Assessment and Plan:     Cervical cancer screen - pap done  Counseling for IUD - multiplex and G/C today.  Insertion scheduled.  Handouts provided.     Daisy Schilling NP, NP  4/3/2025  2:23 PM

## 2025-04-09 LAB
HPV HR 12 DNA CVX QL NAA+PROBE: NEGATIVE
HPV16 DNA CVX QL NAA+PROBE: NEGATIVE
HPV18 DNA CVX QL NAA+PROBE: NEGATIVE
HUMAN PAPILLOMA VIRUS FINAL DIAGNOSIS: NORMAL

## 2025-04-14 LAB
BKR AP ASSOCIATED HPV REPORT: NORMAL
BKR LAB AP GYN ADEQUACY: NORMAL
BKR LAB AP GYN INTERPRETATION: NORMAL
BKR LAB AP PREVIOUS ABNORMAL: NORMAL
PATH REPORT.COMMENTS IMP SPEC: NORMAL
PATH REPORT.COMMENTS IMP SPEC: NORMAL
PATH REPORT.RELEVANT HX SPEC: NORMAL

## 2025-08-13 ENCOUNTER — MYC MEDICAL ADVICE (OUTPATIENT)
Dept: OBGYN | Facility: OTHER | Age: 35
End: 2025-08-13

## 2025-08-13 DIAGNOSIS — Z30.011 ENCOUNTER FOR INITIAL PRESCRIPTION OF CONTRACEPTIVE PILLS: ICD-10-CM

## 2025-08-14 RX ORDER — NORGESTIMATE AND ETHINYL ESTRADIOL 0.25-0.035
1 KIT ORAL DAILY
Qty: 84 TABLET | Refills: 3 | Status: SHIPPED | OUTPATIENT
Start: 2025-08-14

## (undated) DEVICE — CANISTER SUCTION MEDI-VAC GUARDIAN 2000ML 90D 65651-220

## (undated) DEVICE — ESU GROUND PAD ADULT W/CORD E7507

## (undated) DEVICE — SU NUROLON 0 CT-2 8X18" C527D

## (undated) DEVICE — SU DERMABOND MINI DHVM12

## (undated) DEVICE — SU PDS II 0 CT-1 36" Z346H

## (undated) DEVICE — PACK BASIN SET UP SUTCNBSBBA

## (undated) DEVICE — SOL WATER IRRIG 1000ML BOTTLE 2F7114

## (undated) DEVICE — COVER LT HANDLE 2/PK 5160-2FG

## (undated) DEVICE — IRRIGATION-NACL 1000ML

## (undated) DEVICE — SOL NACL 0.9% IRRIG 1000ML BOTTLE 2F7124

## (undated) DEVICE — CATH TRAY 16FR METER W/STATLOCK LATEX] A902916

## (undated) DEVICE — FLUID TRAP FOR VIROSAFE FILTERS

## (undated) DEVICE — SCD SLEEVE-KNEE REG.

## (undated) DEVICE — PACK LAPAROTOMY CUSTOM SBA32LPMBG

## (undated) DEVICE — GLV-8.5 BIOGEL LATEX

## (undated) DEVICE — SENSOR-OXISENSOR II ADULT

## (undated) DEVICE — BLADE 11 RB BK SS STRL LF DISP 371211

## (undated) DEVICE — GOWN SURG XL LVL 3 REINFORCED 9541

## (undated) DEVICE — SURGICAL BINDER-ABDOMINAL 46-62

## (undated) DEVICE — GLV 8.5 BIOGEL LATEX 30485-01

## (undated) DEVICE — Device

## (undated) DEVICE — TAPE MEDIPORE 4"X2YD 2864

## (undated) DEVICE — SUTURE-PDS II 0 CTX Z370T

## (undated) DEVICE — CONTAINER-STERILE 4OZ WRAPPED (OR)

## (undated) DEVICE — SU PDS II 2-0 CT-2 27"  Z333H

## (undated) DEVICE — SUTURE PDS II 0 UR-6 27IN MONOFILAMENT VIOLET D7185

## (undated) DEVICE — PACK C-SECTION CUSTOM SMA32CSMBU

## (undated) DEVICE — CAUTERY PAD-POLYHESIVE II ADULT

## (undated) DEVICE — SU PDS II 0 CTX 36" Z370T

## (undated) DEVICE — SLEEVE SCD EXPRESS KNEE LENGTH MED 9529

## (undated) DEVICE — DRSG STERI STRIP 1/2X4" R1547

## (undated) DEVICE — SU VICRYL 0 UR-6 27" J603H

## (undated) DEVICE — SU CHROMIC 1 CTX 36" 905H

## (undated) DEVICE — PACK-C-SECTION-CUSTOM

## (undated) DEVICE — SU VICRYL 3-0 SH 27" UND J416H

## (undated) DEVICE — DRSG TEGADERM 4X4 3/4" 1626

## (undated) DEVICE — BNDG ABDOMINAL BINDER 9X45-62" 79-89071

## (undated) DEVICE — GOWN-SURG XXL LVL 3 REINFORCED

## (undated) DEVICE — IRRIGATION-H2O 1000ML

## (undated) DEVICE — SUTURE-CHROMIC GUT 1 CTX 905H

## (undated) DEVICE — SU PROLENE 3-0 SHDA 48" 8534H

## (undated) DEVICE — SYR BULB IRRIG DOVER 60 ML LATEX FREE 67000

## (undated) DEVICE — SYRINGE-ASEPTO IRRIGATION

## (undated) DEVICE — LABEL STERILE PREPRINTED FOR OR FRRH01-2M

## (undated) DEVICE — APPLICATOR-CHLORAPREP 26ML TINTED CHG 2%+ 70% IPA-SURGICAL

## (undated) DEVICE — TUBING SUCTION 20FT N620A

## (undated) DEVICE — CATH TRAY-16FR METER W/STATLOCK LATEX]

## (undated) DEVICE — SU VICRYL 0 CT 36" J358H

## (undated) DEVICE — PREP CHLORAPREP 26ML TINTED HI-LITE ORANGE 930815

## (undated) DEVICE — DRSG GAUZE 4X4" 3033

## (undated) DEVICE — SU MONOCRYL 4-0 PS-2 18" UND Y496G

## (undated) DEVICE — LABEL-STERILE PREPRINTED FOR OR

## (undated) DEVICE — CANISTER-SUCTION 2000CC

## (undated) DEVICE — GLV 7.5 BIOGEL LATEX 30475-01

## (undated) DEVICE — SU PROLENE 2-0 CT-2 30" 8411H

## (undated) DEVICE — SPONGE RAY-TEC 4X4" 7317

## (undated) DEVICE — TAPE-MEDIPORE 4" X 2YD

## (undated) DEVICE — SUTURE-VICRYL 0 CT J358H

## (undated) DEVICE — DRSG-SPONGE STERILE 8 X 4

## (undated) DEVICE — LIGHT HANDLE COVER

## (undated) DEVICE — GOWN-SURG XL LVL 3 REINFORCED

## (undated) DEVICE — COTTON BALL 2IN STRL C15000-300

## (undated) DEVICE — BIN-COVIDIEN MESH BIN BN50

## (undated) RX ORDER — EPHEDRINE SULFATE 50 MG/ML
INJECTION, SOLUTION INTRAMUSCULAR; INTRAVENOUS; SUBCUTANEOUS
Status: DISPENSED
Start: 2023-02-09

## (undated) RX ORDER — PHENYLEPHRINE HCL IN 0.9% NACL 1 MG/10 ML
SYRINGE (ML) INTRAVENOUS
Status: DISPENSED
Start: 2019-07-18

## (undated) RX ORDER — PROPOFOL 10 MG/ML
INJECTION, EMULSION INTRAVENOUS
Status: DISPENSED
Start: 2023-10-03

## (undated) RX ORDER — OXYTOCIN 10 [USP'U]/ML
INJECTION, SOLUTION INTRAMUSCULAR; INTRAVENOUS
Status: DISPENSED
Start: 2019-07-18

## (undated) RX ORDER — KETOROLAC TROMETHAMINE 30 MG/ML
INJECTION, SOLUTION INTRAMUSCULAR; INTRAVENOUS
Status: DISPENSED
Start: 2023-02-09

## (undated) RX ORDER — OXYTOCIN/0.9 % SODIUM CHLORIDE 30/500 ML
PLASTIC BAG, INJECTION (ML) INTRAVENOUS
Status: DISPENSED
Start: 2019-07-18

## (undated) RX ORDER — HYDROMORPHONE HYDROCHLORIDE 2 MG/ML
INJECTION, SOLUTION INTRAMUSCULAR; INTRAVENOUS; SUBCUTANEOUS
Status: DISPENSED
Start: 2019-07-18

## (undated) RX ORDER — ONDANSETRON 2 MG/ML
INJECTION INTRAMUSCULAR; INTRAVENOUS
Status: DISPENSED
Start: 2019-07-18

## (undated) RX ORDER — HYDROMORPHONE HYDROCHLORIDE 1 MG/ML
INJECTION, SOLUTION INTRAMUSCULAR; INTRAVENOUS; SUBCUTANEOUS
Status: DISPENSED
Start: 2023-02-09

## (undated) RX ORDER — ONDANSETRON 2 MG/ML
INJECTION INTRAMUSCULAR; INTRAVENOUS
Status: DISPENSED
Start: 2023-02-09

## (undated) RX ORDER — DEXAMETHASONE SODIUM PHOSPHATE 10 MG/ML
INJECTION, SOLUTION INTRAMUSCULAR; INTRAVENOUS
Status: DISPENSED
Start: 2019-07-18

## (undated) RX ORDER — KETOROLAC TROMETHAMINE 30 MG/ML
INJECTION, SOLUTION INTRAMUSCULAR; INTRAVENOUS
Status: DISPENSED
Start: 2019-07-18

## (undated) RX ORDER — FENTANYL CITRATE-0.9 % NACL/PF 10 MCG/ML
PLASTIC BAG, INJECTION (ML) INTRAVENOUS
Status: DISPENSED
Start: 2023-02-09